# Patient Record
Sex: FEMALE | Race: WHITE | NOT HISPANIC OR LATINO | Employment: OTHER | URBAN - METROPOLITAN AREA
[De-identification: names, ages, dates, MRNs, and addresses within clinical notes are randomized per-mention and may not be internally consistent; named-entity substitution may affect disease eponyms.]

---

## 2017-02-27 ENCOUNTER — ALLSCRIPTS OFFICE VISIT (OUTPATIENT)
Dept: OTHER | Facility: OTHER | Age: 72
End: 2017-02-27

## 2017-03-22 ENCOUNTER — HOSPITAL ENCOUNTER (OUTPATIENT)
Dept: MAMMOGRAPHY | Facility: CLINIC | Age: 72
Discharge: HOME | End: 2017-03-22
Payer: MEDICARE

## 2017-03-22 DIAGNOSIS — C50.919 MALIGNANT NEOPLASM OF FEMALE BREAST (HCC): ICD-10-CM

## 2017-03-22 PROCEDURE — G0206 DX MAMMO INCL CAD UNI: HCPCS

## 2017-03-29 ENCOUNTER — ALLSCRIPTS OFFICE VISIT (OUTPATIENT)
Dept: OTHER | Facility: OTHER | Age: 72
End: 2017-03-29

## 2017-04-14 ENCOUNTER — ALLSCRIPTS OFFICE VISIT (OUTPATIENT)
Dept: OTHER | Facility: OTHER | Age: 72
End: 2017-04-14

## 2017-04-24 ENCOUNTER — TRANSCRIBE ORDERS (OUTPATIENT)
Dept: LAB | Facility: CLINIC | Age: 72
End: 2017-04-24

## 2017-04-24 ENCOUNTER — APPOINTMENT (OUTPATIENT)
Dept: LAB | Facility: CLINIC | Age: 72
End: 2017-04-24
Payer: MEDICARE

## 2017-04-24 DIAGNOSIS — E78.5 HYPERLIPIDEMIA: ICD-10-CM

## 2017-04-24 DIAGNOSIS — E03.9 HYPOTHYROIDISM: ICD-10-CM

## 2017-04-24 LAB
ALBUMIN SERPL BCP-MCNC: 3.6 G/DL (ref 3.5–5)
ALP SERPL-CCNC: 70 U/L (ref 46–116)
ALT SERPL W P-5'-P-CCNC: 27 U/L (ref 12–78)
ANION GAP SERPL CALCULATED.3IONS-SCNC: 8 MMOL/L (ref 4–13)
AST SERPL W P-5'-P-CCNC: 17 U/L (ref 5–45)
BILIRUB SERPL-MCNC: 0.54 MG/DL (ref 0.2–1)
BUN SERPL-MCNC: 15 MG/DL (ref 5–25)
CALCIUM SERPL-MCNC: 8.9 MG/DL (ref 8.3–10.1)
CHLORIDE SERPL-SCNC: 102 MMOL/L (ref 100–108)
CHOLEST SERPL-MCNC: 147 MG/DL (ref 50–200)
CO2 SERPL-SCNC: 29 MMOL/L (ref 21–32)
CREAT SERPL-MCNC: 0.48 MG/DL (ref 0.6–1.3)
GFR SERPL CREATININE-BSD FRML MDRD: >60 ML/MIN/1.73SQ M
GLUCOSE P FAST SERPL-MCNC: 101 MG/DL (ref 65–99)
HDLC SERPL-MCNC: 57 MG/DL (ref 40–60)
LDLC SERPL CALC-MCNC: 61 MG/DL (ref 0–100)
POTASSIUM SERPL-SCNC: 3.7 MMOL/L (ref 3.5–5.3)
PROT SERPL-MCNC: 6.9 G/DL (ref 6.4–8.2)
SODIUM SERPL-SCNC: 139 MMOL/L (ref 136–145)
TRIGL SERPL-MCNC: 147 MG/DL
TSH SERPL DL<=0.05 MIU/L-ACNC: 4.32 UIU/ML (ref 0.36–3.74)

## 2017-04-24 PROCEDURE — 84443 ASSAY THYROID STIM HORMONE: CPT

## 2017-04-24 PROCEDURE — 80061 LIPID PANEL: CPT

## 2017-04-24 PROCEDURE — 36415 COLL VENOUS BLD VENIPUNCTURE: CPT

## 2017-04-24 PROCEDURE — 80053 COMPREHEN METABOLIC PANEL: CPT

## 2017-04-26 ENCOUNTER — GENERIC CONVERSION - ENCOUNTER (OUTPATIENT)
Dept: OTHER | Facility: OTHER | Age: 72
End: 2017-04-26

## 2017-05-02 ENCOUNTER — ALLSCRIPTS OFFICE VISIT (OUTPATIENT)
Dept: OTHER | Facility: OTHER | Age: 72
End: 2017-05-02

## 2017-05-02 ENCOUNTER — LAB REQUISITION (OUTPATIENT)
Dept: LAB | Facility: HOSPITAL | Age: 72
End: 2017-05-02
Payer: MEDICARE

## 2017-05-02 DIAGNOSIS — Z78.0 ASYMPTOMATIC MENOPAUSAL STATE: ICD-10-CM

## 2017-05-02 DIAGNOSIS — Z01.419 ENCOUNTER FOR GYNECOLOGICAL EXAMINATION WITHOUT ABNORMAL FINDING: ICD-10-CM

## 2017-05-02 DIAGNOSIS — Z79.811 LONG TERM CURRENT USE OF AROMATASE INHIBITOR: ICD-10-CM

## 2017-05-02 PROCEDURE — G0145 SCR C/V CYTO,THINLAYER,RESCR: HCPCS | Performed by: OBSTETRICS & GYNECOLOGY

## 2017-05-05 ENCOUNTER — GENERIC CONVERSION - ENCOUNTER (OUTPATIENT)
Dept: OTHER | Facility: OTHER | Age: 72
End: 2017-05-05

## 2017-05-08 LAB
LAB AP GYN PRIMARY INTERPRETATION: NORMAL
Lab: NORMAL

## 2017-08-28 ENCOUNTER — ALLSCRIPTS OFFICE VISIT (OUTPATIENT)
Dept: OTHER | Facility: OTHER | Age: 72
End: 2017-08-28

## 2017-09-26 ENCOUNTER — TRANSCRIBE ORDERS (OUTPATIENT)
Dept: ADMINISTRATIVE | Facility: HOSPITAL | Age: 72
End: 2017-09-26

## 2017-09-26 ENCOUNTER — ALLSCRIPTS OFFICE VISIT (OUTPATIENT)
Dept: OTHER | Facility: OTHER | Age: 72
End: 2017-09-26

## 2017-09-26 DIAGNOSIS — C50.911 MALIGNANT NEOPLASM OF RIGHT FEMALE BREAST, UNSPECIFIED SITE OF BREAST: Primary | ICD-10-CM

## 2017-10-14 DIAGNOSIS — E78.5 HYPERLIPIDEMIA: ICD-10-CM

## 2017-10-30 ENCOUNTER — TRANSCRIBE ORDERS (OUTPATIENT)
Dept: LAB | Facility: CLINIC | Age: 72
End: 2017-10-30

## 2017-10-30 ENCOUNTER — APPOINTMENT (OUTPATIENT)
Dept: LAB | Facility: CLINIC | Age: 72
End: 2017-10-30
Payer: MEDICARE

## 2017-10-30 DIAGNOSIS — E78.5 HYPERLIPIDEMIA: ICD-10-CM

## 2017-10-30 LAB
ALBUMIN SERPL BCP-MCNC: 3.8 G/DL (ref 3.5–5)
ALP SERPL-CCNC: 68 U/L (ref 46–116)
ALT SERPL W P-5'-P-CCNC: 31 U/L (ref 12–78)
ANION GAP SERPL CALCULATED.3IONS-SCNC: 8 MMOL/L (ref 4–13)
AST SERPL W P-5'-P-CCNC: 20 U/L (ref 5–45)
BILIRUB SERPL-MCNC: 0.55 MG/DL (ref 0.2–1)
BUN SERPL-MCNC: 13 MG/DL (ref 5–25)
CALCIUM SERPL-MCNC: 9.2 MG/DL (ref 8.3–10.1)
CHLORIDE SERPL-SCNC: 104 MMOL/L (ref 100–108)
CHOLEST SERPL-MCNC: 142 MG/DL (ref 50–200)
CO2 SERPL-SCNC: 28 MMOL/L (ref 21–32)
CREAT SERPL-MCNC: 0.6 MG/DL (ref 0.6–1.3)
GFR SERPL CREATININE-BSD FRML MDRD: 92 ML/MIN/1.73SQ M
GLUCOSE P FAST SERPL-MCNC: 99 MG/DL (ref 65–99)
HDLC SERPL-MCNC: 55 MG/DL (ref 40–60)
LDLC SERPL DIRECT ASSAY-MCNC: 77 MG/DL (ref 0–100)
POTASSIUM SERPL-SCNC: 3.8 MMOL/L (ref 3.5–5.3)
PROT SERPL-MCNC: 7.4 G/DL (ref 6.4–8.2)
SODIUM SERPL-SCNC: 140 MMOL/L (ref 136–145)

## 2017-10-30 PROCEDURE — 82465 ASSAY BLD/SERUM CHOLESTEROL: CPT

## 2017-10-30 PROCEDURE — 36415 COLL VENOUS BLD VENIPUNCTURE: CPT

## 2017-10-30 PROCEDURE — 83721 ASSAY OF BLOOD LIPOPROTEIN: CPT

## 2017-10-30 PROCEDURE — 83718 ASSAY OF LIPOPROTEIN: CPT

## 2017-10-30 PROCEDURE — 80053 COMPREHEN METABOLIC PANEL: CPT

## 2017-11-01 ENCOUNTER — GENERIC CONVERSION - ENCOUNTER (OUTPATIENT)
Dept: OTHER | Facility: OTHER | Age: 72
End: 2017-11-01

## 2017-11-03 ENCOUNTER — GENERIC CONVERSION - ENCOUNTER (OUTPATIENT)
Dept: OTHER | Facility: OTHER | Age: 72
End: 2017-11-03

## 2017-11-03 ENCOUNTER — APPOINTMENT (OUTPATIENT)
Dept: RADIATION ONCOLOGY | Facility: CLINIC | Age: 72
End: 2017-11-03
Attending: RADIOLOGY
Payer: MEDICARE

## 2017-11-03 PROCEDURE — 99214 OFFICE O/P EST MOD 30 MIN: CPT | Performed by: RADIOLOGY

## 2017-11-15 ENCOUNTER — ALLSCRIPTS OFFICE VISIT (OUTPATIENT)
Dept: OTHER | Facility: OTHER | Age: 72
End: 2017-11-15

## 2017-11-16 NOTE — PROGRESS NOTES
Assessment    1  Hyperlipidemia (272 4) (E78 5)   2  Hypertension (401 9) (I10)   3  Hypothyroidism (244 9) (E03 9)   4  Malignant neoplasm of upper-outer quadrant of left breast in female, estrogen receptor positive (174 4,V86 0) (C50 412,Z17 0)   5  Use of anastrozole (Arimidex) (V07 52) (Z79 811)   6  Never a smoker   7  Need for immunization against influenza (V04 81) (Z23)   8  Screening for genitourinary condition (V81 6) (Z13 89)    Plan  Hyperlipidemia    · (1) COMPREHENSIVE METABOLIC PANEL; Status:Active; Requested for:05Xau2282;    · (1) LIPID PANEL, FASTING; Status:Active; Requested for:51Swk4466;   Hypothyroidism    · (1) TSH; Status:Active; Requested for:07Pkr9083;   Screening for genitourinary condition    · *VB - Urinary Incontinence Screen (Dx Z13 89 Screen for UI); Status:Complete -Retrospective By Protocol Authorization;   Done: 45YSZ2586 12:17PM    Discussion/Summary    1 : Hyperlipidemia: Cholesterol is fairly reasonable at this point  LDL is excellent, HDL is excellent, total is good  No changes, check in 6 months  Of note, patient did mention that she was having some weakness in her legs with long walks  Would recommend 2 week trial off pravastatin, then restart and look for the symptoms to change during that time  We did discuss this today, and she understands what to do  Hypertension: Stable  No changes  Blood pressures at home range from 704-137 systolic  She is doing extremely well at this point  Hypothyroidism: Patient is due for a recheck of TSH in 6 months  Her last TSH was reasonable  We did discuss the possibility of what to do to improve her thyroid naturally, but there really is not anything that she can use  Follow-up in 6 months  Breast cancer: Follow with specialists  Stable at the moment  Last exam from surgeon showed SUNITHA        Chief Complaint  Follow up hyperglycemia, hyperlipidemia, HTNBW resultsDose Flu immunization administered today - [facility]      History of Present Illness  Patient is here to follow-up on laboratory studies  Please see copies on chart  breast cancer, the patient is following with Radiation Oncology, Hematology/Oncology, Surgical Oncology  Using an estradiol, 1 mg daily  shows a sodium, potassium, creatinine, AST, ALT, GFR that are all normal, along with blood sugar of 99 (normal)  cholesterol 142  LDL 77  HDL 55  thyroid, currently on 25 mcg Synthroid  cholesterol, the patient is currently using pravastatin at 40 mg did mention that her legs occasionally will feel tired, especially with like long walks  She does do exercise, and does not notice any symptoms at those points  Review of Systems   Constitutional: No fever, no chills, feels well, no tiredness, no recent weight gain or weight loss  Eyes: No complaints of eye pain, no red eyes, no eyesight problems, no discharge, no dry eyes, no itching of eyes  ENT: no complaints of earache, no loss of hearing, no nose bleeds, no nasal discharge, no sore throat, no hoarseness  Cardiovascular: No complaints of slow heart rate, no fast heart rate, no chest pain, no palpitations, no leg claudication, no lower extremity edema  Respiratory: No complaints of shortness of breath, no wheezing, no cough, no SOB on exertion, no orthopnea, no PND  Gastrointestinal: No complaints of abdominal pain, no constipation, no nausea or vomiting, no diarrhea, no bloody stools  Genitourinary: No complaints of dysuria, no incontinence, no pelvic pain, no dysmenorrhea, no vaginal discharge or bleeding  Musculoskeletal: No complaints of arthralgias, no myalgias, no joint swelling or stiffness, no limb pain or swelling  Integumentary: No complaints of skin rash or lesions, no itching, no skin wounds, no breast pain or lump  Neurological: No complaints of headache, no confusion, no convulsions, no numbness, no dizziness or fainting, no tingling, no limb weakness, no difficulty walking    Psychiatric: Not suicidal, no sleep disturbance, no anxiety or depression, no change in personality, no emotional problems  Endocrine: No complaints of proptosis, no hot flashes, no muscle weakness, no deepening of the voice, no feelings of weakness  Hematologic/Lymphatic: No complaints of swollen glands, no swollen glands in the neck, does not bleed easily, does not bruise easily  Preventive Quality 65 and Older: Falls Risk: The patient fell 0 times in the past 12 months  The patient currently has no urinary incontinence symptoms  (0)      Active Problems    1  Abdominal cramping (789 00) (R10 9)   2  Bloating (787 3) (R14 0)   3  Encounter for routine gynecological examination with Papanicolaou smear of cervix (V72 31,V76 2) (Z01 419)   4  History of hypokalemia (V12 29) (Z86 39)   5  Hyperglycemia (790 29) (R73 9)   6  Hyperlipidemia (272 4) (E78 5)   7  Hypertension (401 9) (I10)   8  Hypothyroidism (244 9) (E03 9)   9  Malignant neoplasm of upper-outer quadrant of left breast in female, estrogen receptor positive (174 4,V86 0) (C50 412,Z17 0)   10  Menopause (627 2) (Z78 0)   11  Use of anastrozole (Arimidex) (V07 52) (H80 241)    Past Medical History    1  History of Abnormal findings on diagnostic imaging of breast (793 89) (R92 8)   2  History of Age At First Period 16 Years Old (Menarche)   3  History of Age At First Pregnancy 33 Years Old   4  History of 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 (285 3,174 9,E933 1) (D64 81,C50 919,T45  1X5A)   5  History of Encounter for Pap smear (V76 9) (Z12 9)   6  History of Fibroadenoma of right breast (217) (D24 1)   7  History Of 2  Previous Pregnancies (V61 5)   8  History of hypokalemia (V12 29) (Z86 39)   9  History of Long term use of drug (V58 69) (Z79 899)   10  History of Microcalcifications of the breast (793 81) (R92 0)   11  History of Patient on antineoplastic chemotherapy regimen (V58 69) (Z79 899)   12   History of Previous Pregnancies Resulted In 2 Live Birth(S)   13  History of Radiation Therapy (V15 3)    The active problems and past medical history were reviewed and updated today  Surgical History  1  History of Axillary Lymphadenectomy   2  History of Biopsy Breast Percutaneous Needle Core   3  History of Breast Surgery Mastectomy   4  History of Bx Breast Percutan Needle Core Use Imag Guide (Stereotactic)   5  History of Ul  [address]   6  History of  Section   7  History of [place] Lymph Node Biopsy   8  History of Tonsillectomy   9  History of Tubal Ligation    The surgical history was reviewed and updated today  Family History  Mother    1  Family history of Congestive Heart Failure   2  Family history of Coronary Artery Disease (V17 49)   3  Family history of Diabetes Mellitus (V18 0)  Family History    4  Denied: Family history of Cancer   5  No family history of mental disorder    The family history was reviewed and updated today  Social History     · Being A Social Drinker   · Exercises daily (V49 89) (Z78 9)   · Marital History - Currently    · Never a smoker   · No secondhand smoke exposure (V49 89) (Z78 9)   · Retired  The social history was reviewed and updated today  The social history was reviewed and is unchanged  Current Meds   1  Anastrozole 1 MG Oral Tablet; take 1 tablet once daily; Therapy: 81WOR0858 to (Evaluate:43Fny3267)  Requested for: 77Hwa5799; Last Rx:05Tft9727 Ordered   2  Calcium 600+D 600-400 MG-UNIT Oral Tablet; TAKE 2 TABLET Daily; Therapy: 27VSO2031 to Recorded   3  HydroCHLOROthiazide 12 5 MG Oral Capsule; take 1 capsule daily; Therapy: 14JON6283 to (Oneil Regalado)  Requested for: 01YMI2606; Last Rx:2017 Ordered   4  Levothyroxine Sodium 25 MCG Oral Tablet; Take 1 tablet daily; Therapy: 34Vbg4286 to (Oneil Regalado)  Requested for: 97KFQ4919; Last Rx:2017 Ordered   5  Multivitamins Oral Capsule;  Therapy: 91XJM6947 to Recorded 6  Pravastatin Sodium 40 MG Oral Tablet; TAKE 1 TABLET DAILY AS DIRECTED; Therapy: 33ZBN8241 to (Bib Pineda)  Requested for: 16OVB3485; Last Rx:12Oct2017 Ordered    The medication list was reviewed and updated today  Allergies  1  No Known Drug Allergies    Vitals  Vital Signs    Recorded: 48NWG4097 12:30PM Recorded: 38XYI1146 12:03PM   Heart Rate  72, L Radial   Pulse Quality  Regular, L Radial   Systolic 276 456, LUE, Sitting   Diastolic 90 90, LUE, Sitting   BP CUFF SIZE  Large   Height  5 ft 3 5 in   Weight  178 lb    BMI Calculated  31 04   BSA Calculated  1 85       Physical Exam   Constitutional  General appearance: No acute distress, well appearing and well nourished  Pulmonary  Respiratory effort: No increased work of breathing or signs of respiratory distress  Auscultation of lungs: Clear to auscultation  Cardiovascular  Auscultation of heart: Normal rate and rhythm, normal S1 and S2, without murmurs  Carotid pulses: Normal          Health Management  Health Maintenance   (every) THINPREP PAP; every 1 year; Last 04YDA4037; Next Due: 08IBY0336; Overdue  BREAST EXAM; every 1 year; Next Due: 33Rka5366; Overdue  COLONOSCOPY; every 10 years; Last 01NVH2150; Next Due: 65AQR3851; Overdue  Dexa Scan Peripheral (extremity); every 2 years; Next Due: 23Vwn4681; Overdue  PELVIC EXAM; every 1 year; Next Due: 20Dtz6859; Overdue    Future Appointments    Date/Time Provider Specialty Site   03/28/2018 11:45 AM TRISTON Mñuoz  [facility] On[facility]   08/27/2018 09:40 AM TRISTON Lai  Hematology Oncology [facility]       Signatures   Electronically signed by :  TRISTON Mclean ; Nov 15 2017 12:31PM EST                       (Author)

## 2018-01-10 NOTE — PROGRESS NOTES
Assessment    1  Breast cancer (174 9) (C50 919)    Plan  Breast cancer    · Follow-up visit in 6 months Evaluation and Treatment  Follow-up  Status: Complete   Done: 18MZQ6915 10:40AM   Ordered; For: Breast cancer; Ordered By: Francisco Amezcua Performed:  Due: 15EZS5581; Last Updated By: Devin King; 8/22/2016 10:31:34 AM    Discussion/Summary  Discussion Summary:   A 80year old postmenopausal woman with stage IIB left breast cancer, grade 3, ER/NJ positive, HER-2 Fish equivocal disease  She underwent mastectomy with axillary lymph node dissection, without reconstruction resulting in the SUNITHA  Multiple HER-2 fish test were consistently showed a borderline amplification  She was treated as HER-2 positive disease  She completed adjuvant chemotherapy as well as one year course of adjuvant Herceptin, in June 2015  She is currently on adjuvant hormonal therapy with anastrozole with minimal side effects  She has no evidence of recurrent disease, based on her symptomatology, physical exam as well as mammography  I recommended her to continue with anastrozole 1 mg once a day  I will see her again in 6 months for routine follow-up  She is in agreement with my recommendations  Chief Complaint  Chief Complaint: Chief Complaint:   The patient presents to the office today with Left breast cancer  Stage IIB (pT2, N1b, M0) grade 3, ER positive, NJ weakly positive, HER-2 fish, borderline disease  Diagnosed in April 2014  History of Present Illness  HPI: A 80year old postmenopausal woman, who underwent regular screening mammography, which showed an abnormality in her left breast  Therefore, she underwent ultrasound-guided biopsy in March 9, 2014, which showed invasive ductal carcinoma  Subsequently, she underwent mastectomy with axillary lymph node dissection in April 4, 2014 which showed a 3 5 cm invasive ductal carcinoma, grade 3  3/11 axillary lymph node were positive for metastatic disease   This was %, OR 10-15% positive, HER-2 2+ disease  HER-2 Fish was equivoal for the gene amplification  I requested pathology Department to set another section for the second HER-2 Fish, which is pending  She presents today to discuss adjuvant treatment options  She is otherwise healthy with past medical history including hypertension, hypothyroidism, and hypercholesterolemia  She has no complaint of pain  Her weight has been stable  She has no respiratory symptoms  She has no family history of breast cancer or ovarian cancer  Her performance status is normal    Previous Therapy:   1  Adjuvant chemotherapy with dose dense a c  X4 followed by weekly paclitaxel and Herceptin x12  Completed in September 2014  2  postmastectomy radiation therapy completed in December 2014  3  adjuvant Herceptin monotherapy, completed in June 2015  Current Therapy: 1  adjuvant hormonal therapy with anastrozole since October 2014  Disease Status: SUNITHA status post mastectomy with lymph node dissection  Interval History: A 80year old postmenopausal woman, with stage IIB left breast cancer, grade 3, ER positive, HER-2 fish, borderline disease  She had 3 positive lymph nodes, when she underwent mastectomy with lymph node dissection  We have tested her to fish on several occasion, all of which came back as a borderline disease  We decided to treat her as HER-2 positive disease  She completed adjuvant chemotherapy with a c  followed by Taxol and Herceptin  She finished adjuvant Herceptin monotherapy in June 2015 without cardiac toxicity  She is currently on adjuvant hormonal therapy with anastrozole  She presented today for routine follow-up  She is doing quite well  She continued to have mild hot flashes  She has absolutely no musculoskeletal symptoms  Her weight is stable  She has no respiratory symptom  She denied fever, chills or night sweats   Her performance status is normal       Review of Systems  Complete-Female:   Constitutional: as noted in HPI  Eyes: No complaints of eye pain, no red eyes, no eyesight problems, no discharge, no dry eyes, no itching of eyes  ENT: as noted in HPI  Cardiovascular: No complaints of slow heart rate, no fast heart rate, no chest pain, no palpitations, no leg claudication, no lower extremity edema  Respiratory: as noted in HPI  Gastrointestinal: No complaints of abdominal pain, no constipation, no nausea or vomiting, no diarrhea, no bloody stools  Genitourinary: No complaints of dysuria, no incontinence, no pelvic pain, no dysmenorrhea, no vaginal discharge or bleeding  Musculoskeletal: No complaints of arthralgias, no myalgias, no joint swelling or stiffness, no limb pain or swelling  Integumentary: as noted in HPI  Neurological: No complaints of headache, no confusion, no convulsions, no numbness, no dizziness or fainting, no tingling, no limb weakness, no difficulty walking  Psychiatric: Not suicidal, no sleep disturbance, no anxiety or depression, no change in personality, no emotional problems  Endocrine: No complaints of proptosis, no hot flashes, no muscle weakness, no deepening of the voice, no feelings of weakness  Hematologic/Lymphatic: No complaints of swollen glands, no swollen glands in the neck, does not bleed easily, does not bruise easily  ROS Reviewed:   ROS reviewed  Active Problems    1  Abdominal cramping (789 00) (R10 9)   2  Bloating (787 3) (R14 0)   3  Breast cancer (174 9) (C50 919)   4  Encounter for routine gynecological examination with Papanicolaou smear of cervix   (V72 31,V76 2) (Z01 419)   5  History of hypokalemia (V12 29) (Z86 39)   6  Hyperglycemia (790 29) (R73 9)   7  Hyperlipidemia (272 4) (E78 5)   8  Hypertension (401 9) (I10)   9  Hypothyroidism (244 9) (E03 9)   10  Use of anastrozole (Arimidex) (V07 52) (C50 541)    Past Medical History    1  History of Abnormal findings on diagnostic imaging of breast (793 89) (R92 8)   2   History of Age At First Period 16 Years Old (Menarche)   3  History of Age At First Pregnancy 33 Years Old   4  History of 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   (285 3,174 9,E933 1) (D64 81,C50 919,T45  1X5A)   5  History of Encounter for Pap smear (V76 9) (Z12 9)   6  History of Fibroadenoma of right breast (217) (D24 1)   7  History Of 2  Previous Pregnancies (V61 5)   8  History of hypokalemia (V12 29) (Z86 39)   9  History of Long term use of drug (V58 69) (Z79 899)   10  History of Microcalcifications of the breast (793 81) (R92 0)   11  History of Patient on antineoplastic chemotherapy regimen (V58 69) (Z79 899)   12  History of Previous Pregnancies Resulted In 2  Live Birth(S)   13  History of Radiation Therapy (V15 3)  Past Medical History Reviewed: The past medical history was reviewed and updated today  Surgical History    1  History of Axillary Lymphadenectomy   2  History of Biopsy Breast Percutaneous Needle Core   3  History of Breast Surgery Mastectomy   4  History of Bx Breast Percutan Needle Core Use Imag Guide (Stereotactic)   5  History of Ul  [address]   6  History of  Section   7  History of [place] Lymph Node Biopsy   8  History of Tonsillectomy   9  History of Tubal Ligation  Surgical History Reviewed: The surgical history was reviewed and updated today  Family History  Mother    1  Family history of Congestive Heart Failure   2  Family history of Coronary Artery Disease (V17 49)   3  Family history of Diabetes Mellitus (V18 0)  Family History    4  Denied: Family history of Cancer  Family History Reviewed: The family history was reviewed and updated today  Social History    · Being A Social Drinker   · Exercises daily (V49 89) (Z78 9)   · Marital History - Currently    · Never A Smoker   · No secondhand smoke exposure (V49 89) (Z78 9)   · Retired  Social History Reviewed:  The social history was reviewed and updated today  The social history was reviewed and is unchanged  Current Meds   1  Anastrozole 1 MG Oral Tablet; take 1 tablet once daily; Therapy: 73WMA2108 to 61 23 68)  Requested for: [id number]; Last   Rx:97Afy1493 Ordered   2  Calcium 600+D 600-400 MG-UNIT Oral Tablet; TAKE 2 TABLET Daily; Therapy: 86BXY3642 to Recorded   3  Hydrochlorothiazide 12 5 MG Oral Capsule; take 1 capsule daily; Therapy: 83ULX4276 to (Authur Blush)  Requested for: 11NMV3767; Last   Rx:09Nov2015 Ordered   4  Levothyroxine Sodium 25 MCG Oral Tablet; Take 1 tablet daily; Therapy: 78Ojw3368 to (Authur Blush)  Requested for: 33TMK2994; Last   Rx:09Nov2015 Ordered   5  Pravastatin Sodium 40 MG Oral Tablet; TAKE 1 TABLET DAILY AS DIRECTED; Therapy: 96ORY8185 to (Authur Blush)  Requested for: 88HXO3456; Last   Rx:09Nov2015 Ordered  Medication List Reviewed: The medication list was reviewed and updated today  Medication List Reviewed: The medication list was reviewed and updated today  Allergies    1  No Known Drug Allergies    Vitals  Vital Signs    Recorded: 00LVH1302 67:39RK   Systolic 893   Diastolic 88   Heart Rate 78   Respiration 18   Temperature 98 5 F   O2 Saturation 97   Height 5 ft 4 in   Weight 176 lb    BMI Calculated 30 21   BSA Calculated 1 86     Physical Exam    Constitutional   General appearance: No acute distress, well appearing and well nourished  Eyes   Conjunctiva and lids: No swelling, erythema or discharge  Pupils and irises: Equal, round and reactive to light  Ears, Nose, Mouth, and Throat   External inspection of ears and nose: Normal     Otoscopic examination: Tympanic membranes translucent with normal light reflex  Canals patent without erythema  Nasal mucosa, septum, and turbinates: Normal without edema or erythema  Oropharynx: Normal with no erythema, edema, exudate or lesions      Pulmonary   Respiratory effort: No increased work of breathing or signs of respiratory distress  Auscultation of lungs: Clear to auscultation  Cardiovascular   Palpation of heart: Normal PMI, no thrills  Auscultation of heart: Normal rate and rhythm, normal S1 and S2, without murmurs  Examination of extremities for edema and/or varicosities: Normal     Carotid pulses: Normal     Abdomen   Abdomen: Non-tender, no masses  Liver and spleen: No hepatomegaly or splenomegaly  Lymphatic   Palpation of lymph nodes in neck: No lymphadenopathy  Musculoskeletal   Gait and station: Normal     Digits and nails: Normal without clubbing or cyanosis  Inspection/palpation of joints, bones, and muscles: Normal     Skin   Skin and subcutaneous tissue: Normal without rashes or lesions  Neurologic   Cranial nerves: Cranial nerves 2-12 intact  Reflexes: 2+ and symmetric  Sensation: No sensory loss  Psychiatric   Orientation to person, place, and time: Normal     Mood and affect: Normal     Additional Exam:  Breast exam; status post left mastectomy without reconstruction  No palpable abnormality in her left chest wall  Right breast exam is negative  ECOG 0       Results/Data  Results Form:   Results   Pathology 3 5 cm of invasive ductal carcinoma, grade 3  3/11 axillary lymph node positive for metastatic disease  % positive, PA 10-15% positive, HER-2 2+ disease  HER-2 Fish is equivocal  Second, HER-2 Fish was borderline    Stage IIB(pT2, pN1b, M0)  Radiology Chest x-ray was negative for pulmonary disease  MUGA scan in April 2015 showed ejection fraction 66%  DEXA scan in 2014 showed no evidence of osteopenia or osteoporosis  Mammography on March 2016 was benign  BI-RADS 2  Lab Results WBC 4 1, hemoglobin 10 6, platelet count 931  CMP are unremarkable  Health Management  Health Maintenance   (every) THINPREP PAP; every 1 year; Last 22IAW6766; Next Due: 29VHZ0665; Overdue  BREAST EXAM; every 1 year;  Next Due: 42Dqg6391; Overdue  COLONOSCOPY; every 10 years; Last 13VGN3836; Next Due: 84HLQ1630; Overdue  Dexa Scan Peripheral (extremity); every 2 years; Next Due: 07Rhm1502; Overdue  PELVIC EXAM; every 1 year; Next Due: 80Elq4981; Overdue    Future Appointments    Date/Time Provider Specialty Site   10/14/2016 01:30 PM TRISTON August  [address] [place] [address]   09/26/2016 08:30 AM TRISTON Mccullough   [facility]     Signatures   Electronically signed by : TRISTON Suggs ; Aug 22 2016 10:33AM EST                       (Author)

## 2018-01-11 NOTE — RESULT NOTES
Verified Results  (1) COMPREHENSIVE METABOLIC PANEL [id number] 89:34JZ [place] [facility] Number: [id number]     Test Name Result Flag Reference   SODIUM 140 mmol/L  136-145   POTASSIUM 3 8 mmol/L  3 5-5 3   CHLORIDE 104 mmol/L  100-108   CARBON DIOXIDE 28 mmol/L  21-32   ANION GAP (CALC) 8 mmol/L  4-13   BLOOD UREA NITROGEN 13 mg/dL  5-25   CREATININE 0 60 mg/dL  0 60-1 30   Standardized to IDMS reference method   CALCIUM 9 2 mg/dL  8 3-10 1   BILI, TOTAL 0 55 mg/dL  0 20-1 00   ALK PHOSPHATAS 68 U/L     ALT (SGPT) 31 U/L  12-78   Specimen collection should occur prior to Sulfasalazine and/or Sulfapyridine administration due to the potential for falsely depressed results  AST(SGOT) 20 U/L  5-45   Specimen collection should occur prior to Sulfasalazine administration due to the potential for falsely depressed results  ALBUMIN 3 8 g/dL  3 5-5 0   TOTAL PROTEIN 7 4 g/dL  6 4-8 2   eGFR 92 ml/min/1 73sq m     National Kidney Disease Education Program recommendations are as follows:  GFR calculation is accurate only with a steady state creatinine  Chronic Kidney disease less than 60 ml/min/1 73 sq  meters  Kidney failure less than 15 ml/min/1 73 sq  meters  GLUCOSE FASTING 99 mg/dL  65-99   Specimen collection should occur prior to Sulfasalazine administration due to the potential for falsely depressed results  Specimen collection should occur prior to Sulfapyridine administration due to the potential for falsely elevated results       (1) CHOLESTEROL, TOTAL 30Oct2017 07:44AM [place] [facility]   [employer] Order Number: [id number]     Test Name Result Flag Reference   CHOLESTEROL 142 mg/dL     Cholesterol:         Desirable        <200 mg/dl      Borderline High  200-239 mg/dl      High             >239 mg/dl     (1) LDL,DIRECT [id number] 07:44AM [place] [facility]   [employer] Order Number: [id number]     Test Name Result Flag Reference   LDL, DIRECT 77 mg/dl  0-100   - Patient Instructions: This is a fasting blood test  Water,black tea or black  coffee only after 9:00pm the night before test   Drink 2 glasses of water the morning of test       LDL Cholesterol:        Optimal          <100 mg/dl        Near Optimal     100-129 mg/dl        Above Optimal          Borderline High   130-159 mg/dl          High              160-189 mg/dl          Very High        >189 mg/dl     (1) HDL,DIRECT 30Oct2017 07:44AM Renzo Fleming   [facility] Order Number: [id number]     Test Name Result Flag Reference   HDL,DIRECT 55 mg/dL  40-60   Specimen collection should occur prior to Metamizole administration due to the potential for falsely depressed results

## 2018-01-12 VITALS
WEIGHT: 179 LBS | RESPIRATION RATE: 15 BRPM | SYSTOLIC BLOOD PRESSURE: 128 MMHG | OXYGEN SATURATION: 97 % | DIASTOLIC BLOOD PRESSURE: 84 MMHG | HEART RATE: 88 BPM | HEIGHT: 64 IN | TEMPERATURE: 98.6 F | BODY MASS INDEX: 30.56 KG/M2

## 2018-01-12 VITALS
HEIGHT: 64 IN | RESPIRATION RATE: 16 BRPM | TEMPERATURE: 97.4 F | OXYGEN SATURATION: 96 % | DIASTOLIC BLOOD PRESSURE: 62 MMHG | WEIGHT: 178 LBS | BODY MASS INDEX: 30.39 KG/M2 | SYSTOLIC BLOOD PRESSURE: 130 MMHG | HEART RATE: 82 BPM

## 2018-01-12 VITALS
BODY MASS INDEX: 30.22 KG/M2 | DIASTOLIC BLOOD PRESSURE: 68 MMHG | OXYGEN SATURATION: 95 % | RESPIRATION RATE: 16 BRPM | TEMPERATURE: 98 F | SYSTOLIC BLOOD PRESSURE: 140 MMHG | WEIGHT: 177 LBS | HEART RATE: 84 BPM | HEIGHT: 64 IN

## 2018-01-13 VITALS
BODY MASS INDEX: 30.56 KG/M2 | DIASTOLIC BLOOD PRESSURE: 80 MMHG | OXYGEN SATURATION: 96 % | HEIGHT: 64 IN | WEIGHT: 179 LBS | SYSTOLIC BLOOD PRESSURE: 122 MMHG | TEMPERATURE: 98.4 F | HEART RATE: 76 BPM | RESPIRATION RATE: 15 BRPM

## 2018-01-13 VITALS
BODY MASS INDEX: 30.07 KG/M2 | DIASTOLIC BLOOD PRESSURE: 94 MMHG | HEIGHT: 64 IN | SYSTOLIC BLOOD PRESSURE: 146 MMHG | WEIGHT: 176.13 LBS

## 2018-01-13 VITALS
SYSTOLIC BLOOD PRESSURE: 160 MMHG | HEIGHT: 64 IN | WEIGHT: 178 LBS | DIASTOLIC BLOOD PRESSURE: 90 MMHG | BODY MASS INDEX: 30.39 KG/M2 | HEART RATE: 72 BPM

## 2018-01-13 VITALS
BODY MASS INDEX: 30.63 KG/M2 | WEIGHT: 179.38 LBS | HEART RATE: 72 BPM | DIASTOLIC BLOOD PRESSURE: 82 MMHG | SYSTOLIC BLOOD PRESSURE: 124 MMHG | RESPIRATION RATE: 16 BRPM | HEIGHT: 64 IN

## 2018-01-13 NOTE — RESULT NOTES
Verified Results  (1) COMPREHENSIVE METABOLIC PANEL [id number] 52:63OI Herbert [facility] Order Number: [id number]     Test Name Result Flag Reference   SODIUM 139 mmol/L  136-145   POTASSIUM 3 7 mmol/L  3 5-5 3   CHLORIDE 102 mmol/L  100-108   CARBON DIOXIDE 29 mmol/L  21-32   ANION GAP (CALC) 8 mmol/L  4-13   BLOOD UREA NITROGEN 15 mg/dL  5-25   CREATININE 0 48 mg/dL L 0 60-1 30   Standardized to IDMS reference method   CALCIUM 8 9 mg/dL  8 3-10 1   BILI, TOTAL 0 54 mg/dL  0 20-1 00   ALK PHOSPHATAS 70 U/L     ALT (SGPT) 27 U/L  12-78   AST(SGOT) 17 U/L  5-45   ALBUMIN 3 6 g/dL  3 5-5 0   TOTAL PROTEIN 6 9 g/dL  6 4-8 2   eGFR Non-African American      >60 0 ml/min/1 73sq [facility] Disease Education Program recommendations are as follows:  GFR calculation is accurate only with a steady state creatinine  Chronic Kidney disease less than 60 ml/min/1 73 sq  meters  Kidney failure less than 15 ml/min/1 73 sq  meters  GLUCOSE FASTING 101 mg/dL H 65-99     (1) LIPID PANEL, FASTING 24Apr2017 07:16AM Herbert OVALLE Order Number: [id number]     Test Name Result Flag Reference   CHOLESTEROL 147 mg/dL     HDL,DIRECT 57 mg/dL  40-60   Specimen collection should occur prior to Metamizole administration due to the potential for falsely depressed results  LDL CHOLESTEROL CALCULATED 61 mg/dL  0-100   - Patient Instructions:  This is a fasting blood test  Water,black tea or black  coffee only after 9:00pm the night before test   Drink 2 glasses of water the morning of test       Triglyceride:         Normal              <150 mg/dl       Borderline High    150-199 mg/dl       High               200-499 mg/dl       Very High          >499 mg/dl  Cholesterol:         Desirable        <200 mg/dl      Borderline High  200-239 mg/dl      High             >239 mg/dl  HDL Cholesterol:        High    >59 mg/dL      Low     <41 mg/dL  LDL CALCULATED:    This screening LDL is a calculated result  It does not have the accuracy of the Direct Measured LDL in the monitoring of patients with hyperlipidemia and/or statin therapy  Direct Measure LDL (CMN674) must be ordered separately in these patients  TRIGLYCERIDES 147 mg/dL  <=150   Specimen collection should occur prior to N-Acetylcysteine or Metamizole administration due to the potential for falsely depressed results  (1) TSH 24Apr2017 07:16AM Mitzi Armendariz    Order Number: [id number]     Test Name Result Flag Reference   TSH 4 320 uIU/mL H 0 358-3 740   - Patient Instructions: This bloodwork is non-fasting  Please drink two glasses of water morning of bloodwork  Patients undergoing fluorescein dye angiography may retain small amounts of fluorescein in the body for 48-72 hours post procedure  Samples containing fluorescein can produce falsely depressed TSH values  If the patient had this procedure,a specimen should be resubmitted post fluorescein clearance            The recommended reference ranges for TSH during pregnancy are as follows:  First trimester 0 1 to 2 5 uIU/mL  Second trimester  0 2 to 3 0 uIU/mL  Third trimester 0 3 to 3 0 uIU/m

## 2018-01-22 VITALS
SYSTOLIC BLOOD PRESSURE: 132 MMHG | HEIGHT: 64 IN | HEART RATE: 75 BPM | DIASTOLIC BLOOD PRESSURE: 80 MMHG | OXYGEN SATURATION: 98 % | BODY MASS INDEX: 30.52 KG/M2 | WEIGHT: 178.8 LBS | RESPIRATION RATE: 16 BRPM

## 2018-03-23 ENCOUNTER — HOSPITAL ENCOUNTER (OUTPATIENT)
Dept: MAMMOGRAPHY | Facility: CLINIC | Age: 73
Discharge: HOME | End: 2018-03-23
Payer: MEDICARE

## 2018-03-23 DIAGNOSIS — C50.911 MALIGNANT NEOPLASM OF RIGHT FEMALE BREAST (HCC): ICD-10-CM

## 2018-03-23 PROCEDURE — 77065 DX MAMMO INCL CAD UNI: CPT

## 2018-03-28 ENCOUNTER — OFFICE VISIT (OUTPATIENT)
Dept: SURGICAL ONCOLOGY | Facility: CLINIC | Age: 73
End: 2018-03-28
Payer: MEDICARE

## 2018-03-28 VITALS
HEART RATE: 68 BPM | WEIGHT: 171 LBS | DIASTOLIC BLOOD PRESSURE: 82 MMHG | HEIGHT: 64 IN | SYSTOLIC BLOOD PRESSURE: 128 MMHG | BODY MASS INDEX: 29.19 KG/M2

## 2018-03-28 DIAGNOSIS — C50.912 MALIGNANT NEOPLASM OF LEFT BREAST IN FEMALE, ESTROGEN RECEPTOR POSITIVE, UNSPECIFIED SITE OF BREAST (HCC): Primary | ICD-10-CM

## 2018-03-28 DIAGNOSIS — Z17.0 MALIGNANT NEOPLASM OF LEFT BREAST IN FEMALE, ESTROGEN RECEPTOR POSITIVE, UNSPECIFIED SITE OF BREAST (HCC): Primary | ICD-10-CM

## 2018-03-28 DIAGNOSIS — Z79.811 USE OF ANASTROZOLE (ARIMIDEX): ICD-10-CM

## 2018-03-28 DIAGNOSIS — Z92.21 HX ANTINEOPLASTIC CHEMOTHERAPY: ICD-10-CM

## 2018-03-28 DIAGNOSIS — Z92.3 HX OF RADIATION THERAPY: ICD-10-CM

## 2018-03-28 PROCEDURE — 99214 OFFICE O/P EST MOD 30 MIN: CPT | Performed by: SURGERY

## 2018-03-28 RX ORDER — ANASTROZOLE 1 MG/1
1 TABLET ORAL DAILY
COMMUNITY
Start: 2014-09-26 | End: 2018-04-12 | Stop reason: SDUPTHER

## 2018-03-28 RX ORDER — LEVOTHYROXINE SODIUM 0.03 MG/1
25 TABLET ORAL DAILY
Refills: 3 | COMMUNITY
Start: 2018-01-07 | End: 2018-10-03 | Stop reason: SDUPTHER

## 2018-03-28 RX ORDER — PRAVASTATIN SODIUM 40 MG
40 TABLET ORAL DAILY
Refills: 3 | COMMUNITY
Start: 2018-01-07 | End: 2018-10-03 | Stop reason: SDUPTHER

## 2018-03-28 RX ORDER — HYDROCHLOROTHIAZIDE 12.5 MG/1
12.5 CAPSULE, GELATIN COATED ORAL DAILY
Refills: 3 | COMMUNITY
Start: 2018-01-07 | End: 2018-10-03 | Stop reason: SDUPTHER

## 2018-03-28 NOTE — PROGRESS NOTES
Surgical Oncology Follow Up       [address] [address] [address]    Jose Ansari  1945  [id number]  [address]  [facility]    Chief Complaint   Patient presents with    Follow-up     6 month f/u        Assessment/Plan   Diagnoses and all orders for this visit:    Malignant neoplasm of left breast in female, estrogen receptor positive, unspecified site of breast ([facility] [address] )    Use of anastrozole (Arimidex)    Hx of radiation therapy    Hx antineoplastic chemotherapy    Other orders  -     anastrozole (ARIMIDEX) 1 mg tablet; Take 1 tablet by mouth daily  -     Calcium Carbonate-Vitamin D (CALCIUM 600+D) 600-400 MG-UNIT per tablet; Take 2 tablets by mouth daily  -     hydrochlorothiazide (MICROZIDE) 12 5 mg capsule; Take 12 5 mg by mouth daily  -     levothyroxine 25 mcg tablet; Take 25 mcg by mouth daily  -     Multiple Vitamin (MULTIVITAMINS PO); Take by mouth  -     pravastatin (PRAVACHOL) 40 mg tablet; Take 40 mg by mouth daily        Advance Care Planning/Advance Directives:  Did not discuss  with the patient  Oncology History:    Oncology History      Dose Dense AC X 4        Malignant neoplasm of left breast in female, estrogen receptor positive ([facility] [address] )     Initial Diagnosis     Malignant neoplasm of left breast in female, estrogen receptor positive ([facility] [address] )       3/19/2014 Surgery     Left breast US guided core biopsy,  [facility]          4/14/2014 Surgery     Left breast mastectomy, SLNB, AND         5/13/2014 Surgery     Placement of mediport         2014 -  Radiation     Post mastectomy Rt  Dr Jennifer Falcon  2014 -  Chemotherapy     05/2014  Dose dense AC X 4 cycles  07/2014  Paclitaxel X 12  Herceptin  Dr Josiah Craig  Hormone Therapy     Anastrazole    Dr Josiah Craig            History of Present Illness: breast cancer follow up  -Interval History:n/a    Review of Systems:  Review of Systems   Constitutional: Negative  Negative for appetite change and fever  Eyes: Negative  Respiratory: Negative for shortness of breath  Cardiovascular: Negative  Gastrointestinal: Negative  Endocrine: Negative  Genitourinary: Negative  Musculoskeletal: Negative  Negative for arthralgias and myalgias  Skin: Negative  Allergic/Immunologic: Negative  Neurological: Negative  Hematological: Negative  Negative for adenopathy  Does not bruise/bleed easily  Psychiatric/Behavioral: Negative  Patient Active Problem List   Diagnosis    Malignant neoplasm of left breast in female, estrogen receptor positive ([facility] [address] )    Use of anastrozole (Arimidex)    Hx antineoplastic chemotherapy     Past Medical History:   Diagnosis Date    Disease of thyroid gland     Hypertension      Past Surgical History:   Procedure Laterality Date    BREAST BIOPSY Left 2014    [facility]    BREAST BIOPSY Right 2015    FIBROADENOMA     SECTION      X2    MASTECTOMY Left 2014    PORTACATH PLACEMENT  2014    SENTINEL LYMPH NODE BIOPSY Left 2014    AND    TONSILLECTOMY      TUBAL LIGATION       No family history on file  Social History     Social History    Marital status: /Civil Union     Spouse name: N/A    Number of children: N/A    Years of education: N/A     Occupational History    Not on file       Social History Main Topics    Smoking status: Never Smoker    Smokeless tobacco: Never Used    Alcohol use 1 2 - 1 8 oz/week     2 - 3 Glasses of wine per week    Drug use: No    Sexual activity: Not on file     Other Topics Concern    Not on file     Social History Narrative    No narrative on file       Current Outpatient Prescriptions:     anastrozole (ARIMIDEX) 1 mg tablet, Take 1 tablet by mouth daily, Disp: , Rfl:     Calcium Carbonate-Vitamin D (CALCIUM 600+D) 600-400 MG-UNIT per tablet, Take 2 tablets by mouth daily, Disp: , Rfl:     hydrochlorothiazide (MICROZIDE) 12 5 mg capsule, Take 12 5 mg by mouth daily, Disp: , Rfl: 3    levothyroxine 25 mcg tablet, Take 25 mcg by mouth daily, Disp: , Rfl: 3    Multiple Vitamin (MULTIVITAMINS PO), Take by mouth, Disp: , Rfl:     pravastatin (PRAVACHOL) 40 mg tablet, Take 40 mg by mouth daily, Disp: , Rfl: 3  No Known Allergies    The following portions of the patient's history were reviewed and updated as appropriate: allergies, current medications, past family history, past medical history, past social history, past surgical history and problem list         Vitals:    03/28/18 1145   BP: 128/82   Pulse: 68       Physical Exam   Constitutional: She is oriented to person, place, and time  She appears well-developed and well-nourished  HENT:   Head: Normocephalic and atraumatic  Cardiovascular: Normal heart sounds  Pulmonary/Chest: Breath sounds normal  Right breast exhibits no inverted nipple, no mass, no nipple discharge, no skin change and no tenderness  Left breast exhibits skin change (mastectomy scar)  Left breast exhibits no mass and no tenderness  Abdominal: Soft  Lymphadenopathy:        Right axillary: No pectoral and no lateral adenopathy present  Left axillary: No pectoral and no lateral adenopathy present  Right: No supraclavicular adenopathy present  Left: No supraclavicular adenopathy present  Neurological: She is alert and oriented to person, place, and time  Psychiatric: She has a normal mood and affect  Results:    Imaging  03/23/2018 right diagnostic mammogram is benign    I reviewed the above imaging data  Discussion/Summary:  71-year-old female status post left mastectomy for a stage IIB carcinoma  She continues on anastrazole  There is no evidence of disease based on examination today or contralateral mammogram   I will see her again in six months or sooner should the need arise

## 2018-04-11 ENCOUNTER — TELEPHONE (OUTPATIENT)
Dept: HEMATOLOGY ONCOLOGY | Facility: CLINIC | Age: 73
End: 2018-04-11

## 2018-04-11 NOTE — TELEPHONE ENCOUNTER
Pt called and indicated that [facility] pharmacy has indicated that the doctor has denied her refill and pt has been on medication for 2014  Needs an update  Doesn't see doctor again November

## 2018-04-12 ENCOUNTER — TELEPHONE (OUTPATIENT)
Dept: HEMATOLOGY ONCOLOGY | Facility: CLINIC | Age: 73
End: 2018-04-12

## 2018-04-12 DIAGNOSIS — Z17.0 MALIGNANT NEOPLASM OF BREAST IN FEMALE, ESTROGEN RECEPTOR POSITIVE, UNSPECIFIED LATERALITY, UNSPECIFIED SITE OF BREAST (HCC): Primary | ICD-10-CM

## 2018-04-12 DIAGNOSIS — C50.919 MALIGNANT NEOPLASM OF BREAST IN FEMALE, ESTROGEN RECEPTOR POSITIVE, UNSPECIFIED LATERALITY, UNSPECIFIED SITE OF BREAST (HCC): Primary | ICD-10-CM

## 2018-04-12 RX ORDER — ANASTROZOLE 1 MG/1
1 TABLET ORAL DAILY
Qty: 90 TABLET | Refills: 1 | Status: SHIPPED | OUTPATIENT
Start: 2018-04-12 | End: 2018-10-03 | Stop reason: SDUPTHER

## 2018-04-12 NOTE — TELEPHONE ENCOUNTER
[facility] in [facility] #[phone] called patient about her Anastrozole said she had it denied by the Dr? Marce Whitaker maybe it needs a prior auth?  Please call patient

## 2018-05-15 DIAGNOSIS — E03.9 HYPOTHYROIDISM: ICD-10-CM

## 2018-05-15 DIAGNOSIS — E78.5 HYPERLIPIDEMIA: ICD-10-CM

## 2018-05-16 ENCOUNTER — TRANSCRIBE ORDERS (OUTPATIENT)
Dept: LAB | Facility: CLINIC | Age: 73
End: 2018-05-16

## 2018-05-16 ENCOUNTER — APPOINTMENT (OUTPATIENT)
Dept: LAB | Facility: CLINIC | Age: 73
End: 2018-05-16
Payer: MEDICARE

## 2018-05-16 DIAGNOSIS — E03.9 HYPOTHYROIDISM: ICD-10-CM

## 2018-05-16 DIAGNOSIS — E78.5 HYPERLIPIDEMIA: ICD-10-CM

## 2018-05-16 LAB
ALBUMIN SERPL BCP-MCNC: 3.6 G/DL (ref 3.5–5)
ALP SERPL-CCNC: 68 U/L (ref 46–116)
ALT SERPL W P-5'-P-CCNC: 29 U/L (ref 12–78)
ANION GAP SERPL CALCULATED.3IONS-SCNC: 8 MMOL/L (ref 4–13)
AST SERPL W P-5'-P-CCNC: 22 U/L (ref 5–45)
BILIRUB SERPL-MCNC: 0.57 MG/DL (ref 0.2–1)
BUN SERPL-MCNC: 15 MG/DL (ref 5–25)
CALCIUM SERPL-MCNC: 8.9 MG/DL (ref 8.3–10.1)
CHLORIDE SERPL-SCNC: 105 MMOL/L (ref 100–108)
CHOLEST SERPL-MCNC: 137 MG/DL (ref 50–200)
CO2 SERPL-SCNC: 26 MMOL/L (ref 21–32)
CREAT SERPL-MCNC: 0.58 MG/DL (ref 0.6–1.3)
GFR SERPL CREATININE-BSD FRML MDRD: 92 ML/MIN/1.73SQ M
GLUCOSE P FAST SERPL-MCNC: 94 MG/DL (ref 65–99)
HDLC SERPL-MCNC: 57 MG/DL (ref 40–60)
LDLC SERPL CALC-MCNC: 64 MG/DL (ref 0–100)
NONHDLC SERPL-MCNC: 80 MG/DL
POTASSIUM SERPL-SCNC: 3.7 MMOL/L (ref 3.5–5.3)
PROT SERPL-MCNC: 7.4 G/DL (ref 6.4–8.2)
SODIUM SERPL-SCNC: 139 MMOL/L (ref 136–145)
TRIGL SERPL-MCNC: 78 MG/DL
TSH SERPL DL<=0.05 MIU/L-ACNC: 4.44 UIU/ML (ref 0.36–3.74)

## 2018-05-16 PROCEDURE — 80053 COMPREHEN METABOLIC PANEL: CPT

## 2018-05-16 PROCEDURE — 36415 COLL VENOUS BLD VENIPUNCTURE: CPT

## 2018-05-16 PROCEDURE — 84443 ASSAY THYROID STIM HORMONE: CPT

## 2018-05-16 PROCEDURE — 80061 LIPID PANEL: CPT

## 2018-05-23 ENCOUNTER — OFFICE VISIT (OUTPATIENT)
Dept: FAMILY MEDICINE CLINIC | Facility: CLINIC | Age: 73
End: 2018-05-23
Payer: MEDICARE

## 2018-05-23 VITALS
DIASTOLIC BLOOD PRESSURE: 70 MMHG | BODY MASS INDEX: 30.38 KG/M2 | SYSTOLIC BLOOD PRESSURE: 138 MMHG | WEIGHT: 177 LBS | HEART RATE: 68 BPM

## 2018-05-23 DIAGNOSIS — M25.562 CHRONIC PAIN OF BOTH KNEES: ICD-10-CM

## 2018-05-23 DIAGNOSIS — E78.5 HYPERLIPIDEMIA, UNSPECIFIED HYPERLIPIDEMIA TYPE: ICD-10-CM

## 2018-05-23 DIAGNOSIS — E03.9 HYPOTHYROIDISM, UNSPECIFIED TYPE: Primary | ICD-10-CM

## 2018-05-23 DIAGNOSIS — M25.561 CHRONIC PAIN OF BOTH KNEES: ICD-10-CM

## 2018-05-23 DIAGNOSIS — G89.29 CHRONIC PAIN OF BOTH KNEES: ICD-10-CM

## 2018-05-23 DIAGNOSIS — Z78.0 MENOPAUSE: ICD-10-CM

## 2018-05-23 DIAGNOSIS — I10 ESSENTIAL HYPERTENSION: ICD-10-CM

## 2018-05-23 PROCEDURE — 99214 OFFICE O/P EST MOD 30 MIN: CPT | Performed by: FAMILY MEDICINE

## 2018-05-23 RX ORDER — ASPIRIN 81 MG/1
81 TABLET ORAL DAILY
COMMUNITY

## 2018-05-23 NOTE — ASSESSMENT & PLAN NOTE
Patient has no symptoms of hypothyroidism, her TSH is slightly elevated but that seemed to be her baseline, patient continued on levothyroxine 25 mcg tablet, I explained to the patient the risk and benefit of increasing her levothyroxine, patient with no symptom preferred to continue on the same dose for right now follow up on a TSH in 6 months  If patient have any symptoms of hypothyroidism to call us immediately to consider increasing her levothyroxine

## 2018-05-23 NOTE — PATIENT INSTRUCTIONS
Knee Exercises   AMBULATORY CARE:   What you need to know about knee exercises:  Knee exercises help strengthen the muscles around your knee  Strong muscles can help reduce pain and decrease your risk of future injury  Knee exercises also help you heal after an injury or surgery  · Start slow  These are beginning exercises  Ask your healthcare provider if you need to see a physical therapist for more advanced exercises  As you get stronger, you may be able to do more sets of each exercise or add weights  · Stop if you feel pain  It is normal to feel some discomfort at first  Regular exercise will help decrease your discomfort over time  · Do the exercises on both legs  Do this so both knees remain strong  · Warm up before you do knee exercises  Walk or ride a stationary bike for 5 or 10 minutes to warm your muscles  How to perform knee stretches safely:  Always stretch before you do strengthening exercises  Do these stretching exercises again after you do the strengthening exercises  Do these stretches 4 or 5 days a week, or as directed  · Standing calf stretch: Face a wall and place both palms flat on the wall, or hold the back of a chair for balance  Keep a slight bend in your knees  Take a big step backward with one leg  Keep your other leg directly under you  Keep both heels flat and press your hips forward  Hold the stretch for 30 seconds, and then relax for 30 seconds  Switch legs  Repeat 2 or 3 times on each leg  · Standing quadriceps stretch:  Stand and place one hand against a wall or hold the back of a chair for balance  With your weight on one leg, bend your other leg and grab your ankle  Bring your heel toward your buttocks  Hold the stretch for 30 to 60 seconds  Switch legs  Repeat 2 or 3 times on each leg  · Sitting hamstring stretch:  Sit with both legs straight in front of you  Do not point or flex your toes   Place your palms on the floor and slide your hands forward until you feel the stretch  Do not round your back  Hold the stretch for 30 seconds  Repeat 2 or 3 times  How to perform knee strengthening exercises safely:  Do these exercises 4 or 5 days a week, or as directed  · Standing half squats:  Stand with your feet shoulder-width apart  Lean your back against a wall or hold the back of a chair for balance, if needed  Slowly sit down about 10 inches, as if you are going to sit in a chair  Your body weight should be mostly over your heels  Hold the squat for 5 seconds, then rise to a standing position  Do 3 sets of 10 squats to strengthen your buttocks and thighs  · Standing hamstring curls: Face a wall and place both palms flat on the wall, or hold the back of a chair for balance  With your weight on one leg, lift your other foot as close to your buttocks as you can  Hold for 5 seconds and then lower your leg  Do 2 sets of 10 curls on each leg  This exercise strengthens the muscles in the back of your thigh  · Standing calf raises:  Face a wall and place both palms flat on the wall, or hold the back of a chair for balance  Stand up straight, and do not lean  Place all your weight on one leg by lifting the other foot off the floor  Raise the heel of the foot that is on the floor as high as you can and then lower it  Do 2 sets of 10 calf raises on each leg to strengthen your calf muscles  · Straight leg lifts:  Lie on your stomach with straight legs  Fold your arms in front of you and rest your head in your arms  Tighten your leg muscles and raise one leg as high as you can  Hold for 5 seconds, then lower your leg  Do 2 sets of 10 lifts on each leg to strengthen your buttocks  · Sitting leg lifts:  Sit in a chair  Slowly straighten and raise one leg  Squeeze your thigh muscles and hold for 5 seconds  Relax and return your foot to the floor  Do 2 sets of 10 lifts on each leg   This helps strengthen the muscles in the front of your thigh  Contact your healthcare provider if:   · You have new pain or your pain becomes worse  · You have questions or concerns about your condition or care  © 2017 [address] [employer] Information is for End User's use only and may not be sold, redistributed or otherwise used for commercial purposes  All illustrations and images included in CareNotes® are the copyrighted property of A D A M [employer] Inc  or Arash Grayson  The above information is an  only  It is not intended as medical advice for individual conditions or treatments  Talk to your doctor, nurse or pharmacist before following any medical regimen to see if it is safe and effective for you

## 2018-05-23 NOTE — PROGRESS NOTES
Assessment/Plan:    Hypothyroidism  Patient has no symptoms of hypothyroidism, her TSH is slightly elevated but that seemed to be her baseline, patient continued on levothyroxine 25 mcg tablet, I explained to the patient the risk and benefit of increasing her levothyroxine, patient with no symptom preferred to continue on the same dose for right now follow up on a TSH in 6 months  If patient have any symptoms of hypothyroidism to call us immediately to consider increasing her levothyroxine  Hypertension  Very stable continue with hydrochlorothiazide  Hyperlipidemia  Stable her numbers are at goals, to continue with pravastatin 40 mg daily  Chronic pain of both knees  Usually related to excess exertion, after prolonged walking or exercising, patient tried to stop the statin with no change or improvement in her symptoms, no evidence of acute inflammation or acute arthritis flare-up, patient can use Tylenol arthritis safely, patient to use NSAIDs for severe pain  Diagnoses and all orders for this visit:    Hypothyroidism, unspecified type  -     TSH, 3rd generation with T4 reflex; Future    Essential hypertension  -     CBC and differential; Future    Hyperlipidemia, unspecified hyperlipidemia type    Menopause    Chronic pain of both knees    Other orders  -     aspirin (ECOTRIN LOW STRENGTH) 81 mg EC tablet; Take 81 mg by mouth daily          Subjective: patient here for a 6 month f/u re: breast cancer, HTN  Patient needs to review bloodwork results  ak     Patient ID: Gustavo Payton is a 68 y o  female  Patient is here for follow-up on her chronic condition include hypertension hyperlipidemia, hypothyroidism  Patient is doing well overall, she had some weight gain but she attributed to diet noncompliance, she denied any fatigue, hair loss, thinning of the hair, cold or heat intolerance, constipation or any other symptoms            The following portions of the patient's history were reviewed and updated as appropriate: allergies, current medications, past family history, past medical history, past social history, past surgical history and problem list     Review of Systems   Constitutional: Negative for appetite change, chills and fever  HENT: Negative for congestion, sore throat and voice change  Eyes: Negative for pain and visual disturbance  Respiratory: Negative for cough  Cardiovascular: Negative for chest pain and palpitations  Gastrointestinal: Negative for abdominal pain, constipation, diarrhea and nausea  Endocrine: Negative for cold intolerance, heat intolerance and polyuria  Genitourinary: Negative for dysuria, enuresis, flank pain and frequency  Musculoskeletal: Negative for gait problem, joint swelling and neck pain  Skin: Negative for color change and wound  Neurological: Negative for dizziness, syncope, speech difficulty, numbness and headaches  Psychiatric/Behavioral: Negative for behavioral problems and confusion  The patient is not nervous/anxious  Objective:    /70   Pulse 68   Wt 80 3 kg (177 lb)   BMI 30 38 kg/m²      Physical Exam   Constitutional: She is oriented to person, place, and time  She appears well-developed and well-nourished  HENT:   Head: Normocephalic and atraumatic  Eyes: Conjunctivae and EOM are normal  Pupils are equal, round, and reactive to light  Neck: Normal range of motion  Neck supple  Cardiovascular: Normal rate, regular rhythm, normal heart sounds and intact distal pulses  Pulmonary/Chest: Effort normal and breath sounds normal    Abdominal: Soft  Bowel sounds are normal    Musculoskeletal: Normal range of motion  Neurological: She is alert and oriented to person, place, and time  She has normal reflexes  Skin: Skin is warm and dry  Psychiatric: She has a normal mood and affect  Her behavior is normal    Vitals reviewed        Recent Results (from the past 1008 hour(s))   Comprehensive metabolic panel    Collection Time: 05/16/18  7:12 AM   Result Value Ref Range    Sodium 139 136 - 145 mmol/L    Potassium 3 7 3 5 - 5 3 mmol/L    Chloride 105 100 - 108 mmol/L    CO2 26 21 - 32 mmol/L    Anion Gap 8 4 - 13 mmol/L    BUN 15 5 - 25 mg/dL    Creatinine 0 58 (L) 0 60 - 1 30 mg/dL    Glucose, Fasting 94 65 - 99 mg/dL    Calcium 8 9 8 3 - 10 1 mg/dL    AST 22 5 - 45 U/L    ALT 29 12 - 78 U/L    Alkaline Phosphatase 68 46 - 116 U/L    Total Protein 7 4 6 4 - 8 2 g/dL    Albumin 3 6 3 5 - 5 0 g/dL    Total Bilirubin 0 57 0 20 - 1 00 mg/dL    eGFR 92 ml/min/1 73sq m   Lipid panel    Collection Time: 05/16/18  7:12 AM   Result Value Ref Range    Cholesterol 137 50 - 200 mg/dL    Triglycerides 78 <=150 mg/dL    HDL, Direct 57 40 - 60 mg/dL    LDL Calculated 64 0 - 100 mg/dL    Non-HDL-Chol (CHOL-HDL) 80 mg/dl   TSH, 3rd generation    Collection Time: 05/16/18  7:12 AM   Result Value Ref Range    TSH 3RD GENERATON 4 440 (H) 0 358 - 3 740 uIU/mL

## 2018-05-23 NOTE — ASSESSMENT & PLAN NOTE
Usually related to excess exertion, after prolonged walking or exercising, patient tried to stop the statin with no change or improvement in her symptoms, no evidence of acute inflammation or acute arthritis flare-up, patient can use Tylenol arthritis safely, patient to use NSAIDs for severe pain

## 2018-08-27 ENCOUNTER — OFFICE VISIT (OUTPATIENT)
Dept: HEMATOLOGY ONCOLOGY | Facility: CLINIC | Age: 73
End: 2018-08-27
Payer: MEDICARE

## 2018-08-27 VITALS
BODY MASS INDEX: 29.88 KG/M2 | OXYGEN SATURATION: 98 % | HEART RATE: 84 BPM | RESPIRATION RATE: 16 BRPM | HEIGHT: 64 IN | SYSTOLIC BLOOD PRESSURE: 122 MMHG | DIASTOLIC BLOOD PRESSURE: 80 MMHG | TEMPERATURE: 98.4 F | WEIGHT: 175 LBS

## 2018-08-27 DIAGNOSIS — C50.919 MALIGNANT NEOPLASM OF FEMALE BREAST, UNSPECIFIED ESTROGEN RECEPTOR STATUS, UNSPECIFIED LATERALITY, UNSPECIFIED SITE OF BREAST (HCC): Primary | ICD-10-CM

## 2018-08-27 DIAGNOSIS — Z17.0 MALIGNANT NEOPLASM OF LEFT BREAST IN FEMALE, ESTROGEN RECEPTOR POSITIVE, UNSPECIFIED SITE OF BREAST (HCC): Primary | ICD-10-CM

## 2018-08-27 DIAGNOSIS — C50.912 MALIGNANT NEOPLASM OF LEFT BREAST IN FEMALE, ESTROGEN RECEPTOR POSITIVE, UNSPECIFIED SITE OF BREAST (HCC): Primary | ICD-10-CM

## 2018-08-27 PROCEDURE — 99214 OFFICE O/P EST MOD 30 MIN: CPT | Performed by: INTERNAL MEDICINE

## 2018-08-27 NOTE — PROGRESS NOTES
Hematology / Oncology Outpatient Follow Up Note    Janie Carrillo 68 y o  female Naeved BENEDICT:[id number]         Date:  8/27/2018    Assessment / Plan:  A 68year old postmenopausal woman with stage IIB left breast cancer, grade 3, ER/VA positive, HER-2 Fish equivocal disease  She underwent mastectomy with axillary lymph node dissection, without reconstruction resulting in the SUNITHA  She has 3 positive lymph nodes  Multiple HER-2 fish test were consistently showed a borderline amplification  She was treated as HER-2 positive disease  She completed adjuvant chemotherapy as well as one year course of adjuvant Herceptin, in June 2015  She is currently on adjuvant hormonal therapy with anastrozole with no side effects  She has no evidence recurrent disease, based on her symptomatology and physical examinations  I recommended her to continue with anastrozole 1 mg once a day  We discussed 10 years versus 5 years of aromatase inhibitor  I would favor 10 years, because of her initial stage as well as lack of side effects from aromatase inhibitor  She is in agreement  I will see her again in a year for routine follow-up  Subjective:     HPI:  A 80year old postmenopausal woman, who underwent regular screening mammography, which showed an abnormality in her left breast  Therefore, she underwent ultrasound-guided biopsy in March 9, 2014, which showed invasive ductal carcinoma  Subsequently, she underwent mastectomy with axillary lymph node dissection in April 4, 2014 which showed a 3 5 cm invasive ductal carcinoma, grade 3  3/11 axillary lymph node were positive for metastatic disease  This was %, VA 10-15% positive, HER-2 2+ disease  HER-2 Fish was equivoal for the gene amplification  I requested pathology Department to set another section for the second HER-2 Fish, which is pending  She presents today to discuss adjuvant treatment options   She is otherwise healthy with past medical history including hypertension, hypothyroidism, and hypercholesterolemia  She has no complaint of pain  Her weight has been stable  She has no respiratory symptoms  She has no family history of breast cancer or ovarian cancer  Her performance status is normal          Interval History:  A 68year old postmenopausal woman, with stage IIB left breast cancer, grade 3, ER positive, HER-2 fish, borderline disease  She had 3 positive lymph nodes, when she underwent mastectomy with lymph node dissection  We have tested her to fish on several occasion, all of which came back as a borderline disease  We decided to treat her as HER-2 positive disease  She completed adjuvant chemotherapy with a c  followed by Taxol and Herceptin  She finished adjuvant Herceptin monotherapy in June 2015 without cardiac toxicity  She is currently on adjuvant hormonal therapy with anastrozole  She came in today for routine follow-up  She continued to do well without any new complaint  She continued to have mild hot flashes  Otherwise, she feels well  She has no musculoskeletal symptoms  She denied bone pain  She denied respiratory symptoms  Her weight is stable  Her performance status is normal       Objective:     Primary Diagnosis:    Left breast cancer  Stage IIB (pT2, N1b, M0) grade 3, ER positive, AR weakly positive, HER-2 fish, borderline disease  Diagnosed in April 2014  Cancer Staging:  Cancer Staging  No matching staging information was found for the patient  Previous Hematologic/ Oncologic Treatment:     1  Adjuvant chemotherapy with dose dense a c  X4 followed by weekly paclitaxel and Herceptin x12  Completed in September 2014  2  postmastectomy radiation therapy completed in December 2014  3  adjuvant Herceptin monotherapy, completed in June 2015  Current Hematologic/ Oncologic Treatment:      1  adjuvant hormonal therapy with anastrozole since October 2014      Disease Status:     SUNITHA status post mastectomy with lymph node dissection  Test Results:    Pathology:    3 5 cm of invasive ductal carcinoma, grade 3  3/11 axillary lymph node positive for metastatic disease  % positive, TN 10-15% positive, HER-2 2+ disease  HER-2 Fish is equivocal  Second, HER-2 Fish was borderline    Stage IIB(pT2, pN1b, M0)  Radiology:    DEXA scan in 2014 showed no evidence of osteopenia or osteoporosis  Mammography on March 2018 was benign  BI-RADS 2  Laboratory:        Physical Exam:      General Appearance:    Alert, oriented        Eyes:    PERRL   Ears:    Normal external ear canals, both ears   Nose:   Nares normal, septum midline   Throat:   Mucosa moist  Pharynx without injection  Neck:   Supple       Lungs:     Clear to auscultation bilaterally   Chest Wall:    No tenderness or deformity    Heart:    Regular rate and rhythm       Abdomen:     Soft, non-tender, bowel sounds +, no organomegaly           Extremities:   Extremities no cyanosis or edema       Skin:   no rash or icterus  Lymph nodes:   Cervical, supraclavicular, and axillary nodes normal   Neurologic:   CNII-XII intact, normal strength, sensation and reflexes     Throughout          Breast exam:   status post left mastectomy without reconstruction  No palpable abnormality in her left chest wall  Right breast exam is negative  ROS: Review of Systems   Constitutional:        Mild hot flashes   All other systems reviewed and are negative  Imaging: No results found        Labs:   Lab Results   Component Value Date    WBC 6 29 04/04/2016    HGB 14 5 04/04/2016    HCT 43 3 04/04/2016    MCV 90 04/04/2016     04/04/2016     Lab Results   Component Value Date     05/16/2018    K 3 7 05/16/2018     05/16/2018    CO2 26 05/16/2018    ANIONGAP 9 09/25/2015    BUN 15 05/16/2018    CREATININE 0 58 (L) 05/16/2018    GLUCOSE 88 09/25/2015    GLUF 94 05/16/2018    CALCIUM 8 9 05/16/2018    AST 22 05/16/2018    ALT 29 05/16/2018    ALKPHOS 68 05/16/2018    PROT 7 1 09/25/2015    BILITOT 0 55 09/25/2015    EGFR 92 05/16/2018         Current Medications: Reviewed  Allergies: Reviewed  PMH/FH/SH:  Reviewed      Vital Sign:    Body surface area is 1 85 meters squared      Wt Readings from Last 3 Encounters:   08/27/18 79 4 kg (175 lb)   05/23/18 80 3 kg (177 lb)   03/28/18 77 6 kg (171 lb)        Temp Readings from Last 3 Encounters:   08/27/18 98 4 °F (36 9 °C) (Tympanic)   09/26/17 98 6 °F (37 °C)   08/28/17 98 °F (36 7 °C)        BP Readings from Last 3 Encounters:   08/27/18 122/80   05/23/18 138/70   03/28/18 128/82         Pulse Readings from Last 3 Encounters:   08/27/18 84   05/23/18 68   03/28/18 68     @LASTSAO2(3)@

## 2018-10-02 ENCOUNTER — OFFICE VISIT (OUTPATIENT)
Dept: SURGICAL ONCOLOGY | Facility: CLINIC | Age: 73
End: 2018-10-02
Payer: MEDICARE

## 2018-10-02 VITALS
TEMPERATURE: 98.3 F | HEIGHT: 64 IN | BODY MASS INDEX: 30.05 KG/M2 | SYSTOLIC BLOOD PRESSURE: 160 MMHG | DIASTOLIC BLOOD PRESSURE: 90 MMHG | WEIGHT: 176 LBS | HEART RATE: 67 BPM | RESPIRATION RATE: 16 BRPM

## 2018-10-02 DIAGNOSIS — Z17.0 MALIGNANT NEOPLASM OF LEFT BREAST IN FEMALE, ESTROGEN RECEPTOR POSITIVE, UNSPECIFIED SITE OF BREAST (HCC): Primary | ICD-10-CM

## 2018-10-02 DIAGNOSIS — C50.912 MALIGNANT NEOPLASM OF LEFT BREAST IN FEMALE, ESTROGEN RECEPTOR POSITIVE, UNSPECIFIED SITE OF BREAST (HCC): Primary | ICD-10-CM

## 2018-10-02 DIAGNOSIS — Z79.811 USE OF ANASTROZOLE (ARIMIDEX): ICD-10-CM

## 2018-10-02 PROBLEM — Z79.899 LONG TERM USE OF DRUG: Status: RESOLVED | Noted: 2018-10-02 | Resolved: 2018-10-02

## 2018-10-02 PROBLEM — R92.8 ABNORMAL FINDINGS ON DIAGNOSTIC IMAGING OF BREAST: Status: RESOLVED | Noted: 2018-10-02 | Resolved: 2018-10-02

## 2018-10-02 PROBLEM — D24.1 FIBROADENOMA OF RIGHT BREAST: Status: RESOLVED | Noted: 2018-10-02 | Resolved: 2018-10-02

## 2018-10-02 PROCEDURE — 99214 OFFICE O/P EST MOD 30 MIN: CPT | Performed by: SURGERY

## 2018-10-02 NOTE — PROGRESS NOTES
Surgical Oncology Follow Up       [facility] SURGICAL ONCOLOGY Abraham Hilario  [address] [place], [place] [address] [address]    Theron Batista  1945  [id number]  [facility] SURGICAL ONCOLOGY FATUMA  [address] [place] [id number]    Chief Complaint   Patient presents with    Breast Cancer     Pt is here for 6 month follow up        Assessment/Plan   Diagnoses and all orders for this visit:    Malignant neoplasm of left breast in female, estrogen receptor positive, unspecified site of breast ([facility] [address] )  -     Mammo diagnostic right w 3d & cad; Future    Use of anastrozole (Arimidex)        Advance Care Planning/Advance Directives:  Did not discuss  with the patient  Oncology History:    Oncology History      Dose Dense AC X 4        Malignant neoplasm of left breast in female, estrogen receptor positive ([facility] [address] )     Initial Diagnosis     Malignant neoplasm of left breast in female, estrogen receptor positive ([facility] [address] )       3/19/2014 Surgery     Left breast US guided core biopsy,  IDC          4/14/2014 Surgery     Left breast mastectomy, SLNB, AND         5/13/2014 Surgery     Placement of mediport         2014 -  Radiation     Post mastectomy Rt  Dr Akilah Crawford  2014 -  Chemotherapy     05/2014  Dose dense AC X 4 cycles  07/2014  Paclitaxel X 12  Herceptin  Dr Claude Junes  Hormone Therapy     Anastrazole  Dr Claude Junes            History of Present Illness: breast cancer follow up   -Interval History: none    Review of Systems:  Review of Systems   Constitutional: Negative  Negative for appetite change and fever  Eyes: Negative  Respiratory: Negative for shortness of breath  Cardiovascular: Negative  Gastrointestinal: Negative  Endocrine: Negative  Genitourinary: Negative  Musculoskeletal: Negative  Negative for arthralgias and myalgias  Skin: Negative  Allergic/Immunologic: Negative  Neurological: Negative  Hematological: Negative  Negative for adenopathy  Does not bruise/bleed easily  Psychiatric/Behavioral: Negative          Patient Active Problem List   Diagnosis    Malignant neoplasm of left breast in female, estrogen receptor positive ([facility] [address] )    Use of anastrozole (Arimidex)    Hyperglycemia    Hyperlipidemia    Hypertension    Hypothyroidism    Menopause    Chronic pain of both knees     Past Medical History:   Diagnosis Date    Abnormal findings on diagnostic imaging of breast     last assessed 3/19/14    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 ([facility] [address] )     last assessed 10/2/15    Disease of thyroid gland     Fibroadenoma of right breast     Hx of radiation therapy     last assessed 17    Hypertension     Hypokalemia     last assessed 4/15/16    Long term use of drug     herceptin,last assessed 17     Past Surgical History:   Procedure Laterality Date    BREAST BIOPSY Left 2014    IDC    BREAST BIOPSY Right 2015    FIBROADENOMA     SECTION      X2    IVC FILTER RETRIEVAL  2014    central iv line with subcutaneous reservoir mediaport    LYMPHADENECTOMY Left 2014    axxillary    MASTECTOMY Left 2014    PORTACATH PLACEMENT  2014    SENTINEL LYMPH NODE BIOPSY Left 2014    AND    TONSILLECTOMY  195    TUBAL LIGATION       Family History   Problem Relation Age of Onset    Heart failure Mother     Coronary artery disease Mother     Diabetes Mother      Social History     Social History    Marital status: /Civil Union     Spouse name: N/A    Number of children: N/A    Years of education: N/A     Occupational History    retired      Social History Main Topics    Smoking status: Never Smoker    Smokeless tobacco: Never Used      Comment: no secondhand smoe exposure    Alcohol use 1 2 - 1 8 oz/week     2 - 3 Glasses of wine per week      Comment: social    Drug use: No    Sexual activity: Not on file     Other Topics Concern    Not on file     Social History Narrative    Exercises daily           Current Outpatient Prescriptions:     anastrozole (ARIMIDEX) 1 mg tablet, Take 1 tablet (1 mg total) by mouth daily, Disp: 90 tablet, Rfl: 1    aspirin (ECOTRIN LOW STRENGTH) 81 mg EC tablet, Take 81 mg by mouth daily, Disp: , Rfl:     Calcium Carbonate-Vitamin D (CALCIUM 600+D) 600-400 MG-UNIT per tablet, Take 2 tablets by mouth daily, Disp: , Rfl:     hydrochlorothiazide (MICROZIDE) 12 5 mg capsule, Take 12 5 mg by mouth daily, Disp: , Rfl: 3    levothyroxine 25 mcg tablet, Take 25 mcg by mouth daily, Disp: , Rfl: 3    Multiple Vitamin (MULTIVITAMINS PO), Take by mouth, Disp: , Rfl:     pravastatin (PRAVACHOL) 40 mg tablet, Take 40 mg by mouth daily, Disp: , Rfl: 3  No Known Allergies    The following portions of the patient's history were reviewed and updated as appropriate: allergies, current medications, past family history, past medical history, past social history, past surgical history and problem list         Vitals:    10/02/18 0951   BP: 160/90   Pulse: 67   Resp: 16   Temp: 98 3 °F (36 8 °C)       Physical Exam   Constitutional: She is oriented to person, place, and time  She appears well-developed and well-nourished  HENT:   Head: Normocephalic and atraumatic  Cardiovascular: Normal heart sounds  Pulmonary/Chest: Breath sounds normal  Right breast exhibits no inverted nipple, no mass, no nipple discharge, no skin change and no tenderness  Left breast exhibits skin change (mastectomy scar)  Left breast exhibits no mass and no tenderness  Abdominal: Soft  Lymphadenopathy:        Right axillary: No pectoral and no lateral adenopathy present  Left axillary: No pectoral and no lateral adenopathy present  Right: No supraclavicular adenopathy present  Left: No supraclavicular adenopathy present     Neurological: She is alert and oriented to person, place, and time  Psychiatric: She has a normal mood and affect  Discussion/Summary:  30-year-old female status post left mastectomy for stage IIB carcinoma  She continues on anastrozole  There is no evidence of disease based on examination today  I will make arrangements for her right-sided mammogram due in March  I will see her again in six months for another exam   At that point if there are no concerns, I will then start to see her on an annual basis as she will be five years out of her diagnosis and surgery

## 2018-10-03 DIAGNOSIS — C50.919 MALIGNANT NEOPLASM OF BREAST IN FEMALE, ESTROGEN RECEPTOR POSITIVE, UNSPECIFIED LATERALITY, UNSPECIFIED SITE OF BREAST (HCC): ICD-10-CM

## 2018-10-03 DIAGNOSIS — E03.9 ACQUIRED HYPOTHYROIDISM: ICD-10-CM

## 2018-10-03 DIAGNOSIS — Z17.0 MALIGNANT NEOPLASM OF BREAST IN FEMALE, ESTROGEN RECEPTOR POSITIVE, UNSPECIFIED LATERALITY, UNSPECIFIED SITE OF BREAST (HCC): ICD-10-CM

## 2018-10-03 DIAGNOSIS — E78.2 MIXED HYPERLIPIDEMIA: ICD-10-CM

## 2018-10-03 DIAGNOSIS — I10 ESSENTIAL HYPERTENSION: Primary | ICD-10-CM

## 2018-10-03 RX ORDER — ANASTROZOLE 1 MG/1
1 TABLET ORAL DAILY
Qty: 90 TABLET | Refills: 1 | Status: SHIPPED | OUTPATIENT
Start: 2018-10-03 | End: 2019-03-21 | Stop reason: SDUPTHER

## 2018-10-04 RX ORDER — HYDROCHLOROTHIAZIDE 12.5 MG/1
CAPSULE, GELATIN COATED ORAL
Qty: 90 CAPSULE | Refills: 3 | Status: SHIPPED | OUTPATIENT
Start: 2018-10-04 | End: 2019-09-21 | Stop reason: SDUPTHER

## 2018-10-04 RX ORDER — LEVOTHYROXINE SODIUM 0.03 MG/1
TABLET ORAL
Qty: 90 TABLET | Refills: 3 | Status: SHIPPED | OUTPATIENT
Start: 2018-10-04 | End: 2019-09-21 | Stop reason: SDUPTHER

## 2018-10-04 RX ORDER — PRAVASTATIN SODIUM 40 MG
TABLET ORAL
Qty: 90 TABLET | Refills: 3 | Status: SHIPPED | OUTPATIENT
Start: 2018-10-04 | End: 2019-09-22 | Stop reason: SDUPTHER

## 2018-10-26 ENCOUNTER — IMMUNIZATION (OUTPATIENT)
Dept: FAMILY MEDICINE CLINIC | Facility: CLINIC | Age: 73
End: 2018-10-26
Payer: MEDICARE

## 2018-10-26 DIAGNOSIS — Z23 NEED FOR INFLUENZA VACCINATION: Primary | ICD-10-CM

## 2018-10-26 PROCEDURE — G0008 ADMIN INFLUENZA VIRUS VAC: HCPCS | Performed by: FAMILY MEDICINE

## 2018-10-26 PROCEDURE — 90662 IIV NO PRSV INCREASED AG IM: CPT | Performed by: FAMILY MEDICINE

## 2018-11-05 ENCOUNTER — CLINICAL SUPPORT (OUTPATIENT)
Dept: RADIATION ONCOLOGY | Facility: CLINIC | Age: 73
End: 2018-11-05
Payer: MEDICARE

## 2018-11-05 ENCOUNTER — RADIATION ONCOLOGY FOLLOW-UP (OUTPATIENT)
Dept: RADIATION ONCOLOGY | Facility: CLINIC | Age: 73
End: 2018-11-05
Attending: RADIOLOGY
Payer: MEDICARE

## 2018-11-05 VITALS
OXYGEN SATURATION: 96 % | BODY MASS INDEX: 30.11 KG/M2 | DIASTOLIC BLOOD PRESSURE: 90 MMHG | TEMPERATURE: 98.6 F | WEIGHT: 176.4 LBS | HEART RATE: 83 BPM | HEIGHT: 64 IN | RESPIRATION RATE: 16 BRPM | SYSTOLIC BLOOD PRESSURE: 140 MMHG

## 2018-11-05 DIAGNOSIS — C50.912 MALIGNANT NEOPLASM OF LEFT BREAST IN FEMALE, ESTROGEN RECEPTOR POSITIVE, UNSPECIFIED SITE OF BREAST (HCC): Primary | ICD-10-CM

## 2018-11-05 DIAGNOSIS — Z17.0 MALIGNANT NEOPLASM OF LEFT BREAST IN FEMALE, ESTROGEN RECEPTOR POSITIVE, UNSPECIFIED SITE OF BREAST (HCC): Primary | ICD-10-CM

## 2018-11-05 PROCEDURE — 99215 OFFICE O/P EST HI 40 MIN: CPT | Performed by: RADIOLOGY

## 2018-11-05 NOTE — PROGRESS NOTES
Follow-up - Radiation Oncology   Katherleen Gaucher 1945 68 y o  female [id number]      History of Present Illness   Cancer Staging  No matching staging information was found for the patient  Stage IIB grade 3 invasive left breast carcinoma  Katherleen Gaucher is a 68y o  year old female with a history of T2, pN1b grade 3 invasive left breast carcinoma status post total mastectomy followed by adjuvant chemotherapy and chest wall with lymph node radiation therapy  She continues to take anastrozole  Interval History:   Patient is 4 years without evidence of disease status post total mastectomy and adjuvant chemotherapy followed by radiation therapy for stage IIB high-grade invasive left breast carcinoma  She continues to take anastrozole and has been advise may go a total of 10 years as per the new guideline  Historical Information   Oncology History             Malignant neoplasm of left breast in female, estrogen receptor positive ([facility] [address] )     Initial Diagnosis     Malignant neoplasm of left breast in female, estrogen receptor positive ([facility] [address] )       3/19/2014 Surgery     Left breast US guided core biopsy,  IDC          4/14/2014 Surgery     Left breast mastectomy, SLNB, AND         10/29/2014 - 12/8/2014 Radiation     Mastectomy scar/left chest wall and the left supraclavicular fossa: 50 4Gy in 28 daily 180cGy fractions  Dr Berkley Lieberman  2014 -  Chemotherapy     05/2014  Dose dense AC X 4 cycles  07/2014  Paclitaxel X 12  Herceptin  Dr Wally Agosto  Hormone Therapy     Anastrazole    Dr Wally Agosto            Past Medical History:   Diagnosis Date    Abnormal findings on diagnostic imaging of breast     last assessed 3/19/14    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 [facility])     last assessed 10/2/15    Disease of thyroid gland     Fibroadenoma of right breast     Hx of radiation therapy     last assessed 9/26/17    Hypertension     Hypokalemia last assessed 4/15/16    Long term use of drug     herceptin,last assessed 17     Past Surgical History:   Procedure Laterality Date    BREAST BIOPSY Left 2014    IDC    BREAST BIOPSY Right 2015    FIBROADENOMA     SECTION      X2    IVC FILTER RETRIEVAL  2014    central iv line with subcutaneous reservoir mediaport    LYMPHADENECTOMY Left 2014    axxillary    MASTECTOMY Left 2014    PORTACATH PLACEMENT  2014    SENTINEL LYMPH NODE BIOPSY Left 2014    AND    TONSILLECTOMY  1956    TUBAL LIGATION         Social History   History   Alcohol Use    1 2 - 1 8 oz/week    2 - 3 Glasses of wine per week     Comment: social     History   Drug Use No     History   Smoking Status    Never Smoker   Smokeless Tobacco    Never Used     Comment: no secondhand smoe exposure         Meds/Allergies     Current Outpatient Prescriptions:     anastrozole (ARIMIDEX) 1 mg tablet, TAKE 1 TABLET (1 MG TOTAL) BY MOUTH DAILY, Disp: 90 tablet, Rfl: 1    aspirin (ECOTRIN LOW STRENGTH) 81 mg EC tablet, Take 81 mg by mouth daily, Disp: , Rfl:     Calcium Carbonate-Vitamin D (CALCIUM 600+D) 600-400 MG-UNIT per tablet, Take 2 tablets by mouth daily, Disp: , Rfl:     hydrochlorothiazide (MICROZIDE) 12 5 mg capsule, TAKE 1 CAPSULE DAILY, Disp: 90 capsule, Rfl: 3    levothyroxine 25 mcg tablet, TAKE 1 TABLET DAILY, Disp: 90 tablet, Rfl: 3    Multiple Vitamin (MULTIVITAMINS PO), Take by mouth, Disp: , Rfl:     pravastatin (PRAVACHOL) 40 mg tablet, TAKE 1 TABLET DAILY AS DIRECTED , Disp: 90 tablet, Rfl: 3  No Known Allergies      Review of Systems    Constitutional: Negative for activity change, appetite change, chills, diaphoresis, fatigue, fever and unexpected weight change     HENT: Negative for congestion, dental problem, drooling, ear discharge, ear pain, facial swelling, hearing loss, mouth sores, nosebleeds, postnasal drip, rhinorrhea, sinus pressure, sneezing, sore throat, tinnitus, trouble swallowing and voice change  Eyes: Negative for photophobia, pain, discharge, redness, itching and visual disturbance  Respiratory: Negative for apnea, cough, choking, chest tightness, shortness of breath, wheezing and stridor  Cardiovascular: Negative for chest pain and palpitations  Gastrointestinal: Negative for abdominal distention, abdominal pain, anal bleeding, blood in stool, constipation, diarrhea, nausea, rectal pain and vomiting  Endocrine: Negative for cold intolerance, heat intolerance, polydipsia, polyphagia and polyuria  Genitourinary: Negative for decreased urine volume, difficulty urinating, dyspareunia, dysuria, enuresis, flank pain, frequency, genital sores, hematuria, menstrual problem, pelvic pain, urgency, vaginal bleeding and vaginal discharge  Musculoskeletal: Negative for arthralgias, back pain, gait problem, joint swelling, myalgias, neck pain and neck stiffness  Skin: Negative for color change, pallor, rash and wound  Allergic/Immunologic: Negative for environmental allergies, food allergies and immunocompromised state  Neurological: Negative for dizziness, tremors, seizures, syncope, facial asymmetry, speech difficulty, weakness, light-headedness, numbness and headaches  Hematological: Negative for adenopathy  Does not bruise/bleed easily  Psychiatric/Behavioral: Negative for agitation, behavioral problems, confusion, decreased concentration, dysphoric mood, hallucinations, self-injury, sleep disturbance and suicidal ideas  The patient is not nervous/anxious and is not hyperactive  OBJECTIVE:   /90   Pulse 83   Temp 98 6 °F (37 °C)   Resp 16   Ht 5' 4" (1 626 m)   Wt 80 kg (176 lb 6 4 oz)   SpO2 96%   BMI 30 28 kg/m²   Pain Assessment:  0  ECOG/Zubrod/WHO: 0 - Asymptomatic    Physical Exam patient is doing well and without any complaints  There are no palpable nodes  Lungs are clear  No abdominal masses    Right breast examination is normal  The left chest wall is fibrotic with no signs of nodules or infiltrates  No edema of the left arm  RESULTS    Lab Results: No results found for this or any previous visit (from the past 672 hour(s))  Imaging Studies:No results found  Assessment/Plan:  No orders of the defined types were placed in this encounter  Follow-up as needed  Daisy Santos is a 68y o  year old female with stage IIB high-grade invasive left breast carcinoma 4 years without evidence of disease following total mastectomy followed by adjuvant chemotherapy and radiation therapy  Dr Ryanne Maynard discussed with her to take hormone Anastrozole for total of 10 years  Elie Ojeda MD  11/5/2018,8:35 AM    Portions of the record may have been created with voice recognition software   Occasional wrong word or "sound a like" substitutions may have occurred due to the inherent limitations of voice recognition software   Read the chart carefully and recognize, using context, where substitutions have occurred

## 2018-11-05 NOTE — PROGRESS NOTES
Saintreyna Bennett  1945   Ms Gagan Connolly is a 68 y o  female       Chief Complaint   Patient presents with    Breast Cancer    Follow-up       Cancer Staging  No matching staging information was found for the patient  Oncology History      Last seen on 11/3/17    3/23/18 Right diagnostic mammogram  IMPRESSION:  No evidence of malignancy  No significant changes when compared with prior studies      ACR BI-RADS® Assessments: BiRad:1 - Negative    8/27/18 Follow up with Dr Glynn He and returns 8/30/2019   continue with anastrozole daily and discussed 10 years versus 5 years of use  "I would favor 10 years, because of her initial stage as well as lack of side effects from aromatase inhibitor  She is in agreement"    10/2/18 Follow up with Dr Bridger Hopson and returns 4/3/19  SUNITHA  Will see in 6 months then start yearly follow up    3/25/19 Right mammogram scheduled               Malignant neoplasm of left breast in female, estrogen receptor positive ([facility] [address] )     Initial Diagnosis     Malignant neoplasm of left breast in female, estrogen receptor positive ([facility] [address] )       3/19/2014 Surgery     Left breast US guided core biopsy,  [facility]          4/14/2014 Surgery     Left breast mastectomy, SLNB, AND         10/29/2014 - 12/8/2014 Radiation     Mastectomy scar/left chest wall and the left supraclavicular fossa: 50 4Gy in 28 daily 180cGy fractions  Dr Chester Milan  2014 -  Chemotherapy     05/2014  Dose dense AC X 4 cycles  07/2014  Paclitaxel X 12  Herceptin  Dr Glynn He  Hormone Therapy     Anastrazole  Dr Glynn He            Clinical Trial: no    Interval History:   Last seen on 11/3/17    3/23/18 Right diagnostic mammogram  IMPRESSION:  No evidence of malignancy  No significant changes when compared with prior studies      ACR BI-RADS® Assessments: BiRad:1 - Negative    8/27/18 Follow up with Dr Glynn He and returns 8/30/2019   continue with anastrozole daily and discussed 10 years versus 5 years of use  "I would favor 10 years, because of her initial stage as well as lack of side effects from aromatase inhibitor  She is in agreement"    10/2/18 Follow up with Dr Lady Knutson and returns 4/3/19  SUNITHA  Will see in 6 months then start yearly follow up    3/25/19 Right mammogram scheduled     Screening  Tobacco  Current tobacco user: no  If yes, brief counseling provided: NA    Hypertension  Hypertension screening performed: yes  Normotensive:  no  If no, referred to PCP: n/a    Depression Screening  Screened for depression using PHQ-2: yes    Screened for depression using PHQ-9:  no  Screening positive or negative:  negative  If score >4, was any of the following actions taken?    Additional evaluation for depression, suicide risk assesment, referral to PCP or psychiatry, medication started:  n/a    Advanced Care Planning for Patients >65 years  Advanced Care Planning Discussed:  yes  Patient named surrogate decision maker or care plan in chart: yes    [unfilled]  Health Maintenance   Topic Date Due    Depression Screening PHQ  1945    CRC Screening: Colonoscopy  1945    DTaP,Tdap,and Td Vaccines (1 - Tdap) 05/09/1967    Fall Risk  05/09/2011    Urinary Incontinence Screening  05/09/2011    Pneumococcal PPSV23/PCV13 65+ Years / High and Highest Risk (2 of 2 - PPSV23) 12/09/2016    INFLUENZA VACCINE  Completed       Patient Active Problem List   Diagnosis    Malignant neoplasm of left breast in female, estrogen receptor positive ([facility] [address] )    Use of anastrozole (Arimidex)    Hyperglycemia    Hyperlipidemia    Hypertension    Hypothyroidism    Menopause    Chronic pain of both knees     Past Medical History:   Diagnosis Date    Abnormal findings on diagnostic imaging of breast     last assessed 3/19/14    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 ([facility] [address] )     last assessed 10/2/15    Disease of thyroid gland     Fibroadenoma of right breast     Hx of radiation therapy     last assessed 17    Hypertension     Hypokalemia     last assessed 4/15/16    Long term use of drug     herceptin,last assessed 17     Past Surgical History:   Procedure Laterality Date    BREAST BIOPSY Left 2014    [facility]    BREAST BIOPSY Right 2015    FIBROADENOMA     SECTION      X2    IVC FILTER RETRIEVAL  2014    central iv line with subcutaneous reservoir mediaport    LYMPHADENECTOMY Left 2014    axxillary    MASTECTOMY Left 2014    PORTACATH PLACEMENT  2014    SENTINEL LYMPH NODE BIOPSY Left 2014    AND    TONSILLECTOMY  195    TUBAL LIGATION       Family History   Problem Relation Age of Onset    Heart failure Mother     Coronary artery disease Mother     Diabetes Mother      Social History     Social History    Marital status: /Civil Union     Spouse name: N/A    Number of children: N/A    Years of education: N/A     Occupational History    retired      Social History Main Topics    Smoking status: Never Smoker    Smokeless tobacco: Never Used      Comment: no secondhand smoe exposure    Alcohol use 1 2 - 1 8 oz/week     2 - 3 Glasses of wine per week      Comment: social    Drug use: No    Sexual activity: Not on file     Other Topics Concern    Not on file     Social History Narrative    Exercises daily           Current Outpatient Prescriptions:     anastrozole (ARIMIDEX) 1 mg tablet, TAKE 1 TABLET (1 MG TOTAL) BY MOUTH DAILY, Disp: 90 tablet, Rfl: 1    aspirin (ECOTRIN LOW STRENGTH) 81 mg EC tablet, Take 81 mg by mouth daily, Disp: , Rfl:     Calcium Carbonate-Vitamin D (CALCIUM 600+D) 600-400 MG-UNIT per tablet, Take 2 tablets by mouth daily, Disp: , Rfl:     hydrochlorothiazide (MICROZIDE) 12 5 mg capsule, TAKE 1 CAPSULE DAILY, Disp: 90 capsule, Rfl: 3    levothyroxine 25 mcg tablet, TAKE 1 TABLET DAILY, Disp: 90 tablet, Rfl: 3    Multiple Vitamin (MULTIVITAMINS PO), Take by mouth, Disp: , Rfl:   pravastatin (PRAVACHOL) 40 mg tablet, TAKE 1 TABLET DAILY AS DIRECTED , Disp: 90 tablet, Rfl: 3  No Known Allergies    Review of Systems:  Review of Systems   Constitutional: Negative  HENT: Negative  Eyes: Negative  Respiratory: Negative  Cardiovascular: Negative  Gastrointestinal: Negative  Endocrine: Negative  Genitourinary: Negative  Musculoskeletal: Positive for arthralgias  Skin: Negative  Allergic/Immunologic: Positive for environmental allergies  Neurological: Negative  Hematological: Negative  Psychiatric/Behavioral: Negative  Vitals:    11/05/18 0813   BP: 140/90   Pulse: 83   Resp: 16   Temp: 98 6 °F (37 °C)   SpO2: 96%   Weight: 80 kg (176 lb 6 4 oz)   Height: 5' 4" (1 626 m)       Pain Score: 0-No pain    Imaging:No results found      Teaching

## 2018-11-23 ENCOUNTER — APPOINTMENT (OUTPATIENT)
Dept: LAB | Facility: CLINIC | Age: 73
End: 2018-11-23
Payer: MEDICARE

## 2018-11-23 DIAGNOSIS — I10 ESSENTIAL HYPERTENSION: ICD-10-CM

## 2018-11-23 DIAGNOSIS — E03.9 HYPOTHYROIDISM, UNSPECIFIED TYPE: ICD-10-CM

## 2018-11-23 LAB
BASOPHILS # BLD AUTO: 0.03 THOUSANDS/ΜL (ref 0–0.1)
BASOPHILS NFR BLD AUTO: 1 % (ref 0–1)
EOSINOPHIL # BLD AUTO: 0.17 THOUSAND/ΜL (ref 0–0.61)
EOSINOPHIL NFR BLD AUTO: 3 % (ref 0–6)
ERYTHROCYTE [DISTWIDTH] IN BLOOD BY AUTOMATED COUNT: 13.5 % (ref 11.6–15.1)
HCT VFR BLD AUTO: 44.3 % (ref 34.8–46.1)
HGB BLD-MCNC: 14.3 G/DL (ref 11.5–15.4)
IMM GRANULOCYTES # BLD AUTO: 0.01 THOUSAND/UL (ref 0–0.2)
IMM GRANULOCYTES NFR BLD AUTO: 0 % (ref 0–2)
LYMPHOCYTES # BLD AUTO: 1.45 THOUSANDS/ΜL (ref 0.6–4.47)
LYMPHOCYTES NFR BLD AUTO: 23 % (ref 14–44)
MCH RBC QN AUTO: 29.6 PG (ref 26.8–34.3)
MCHC RBC AUTO-ENTMCNC: 32.3 G/DL (ref 31.4–37.4)
MCV RBC AUTO: 92 FL (ref 82–98)
MONOCYTES # BLD AUTO: 0.67 THOUSAND/ΜL (ref 0.17–1.22)
MONOCYTES NFR BLD AUTO: 11 % (ref 4–12)
NEUTROPHILS # BLD AUTO: 3.89 THOUSANDS/ΜL (ref 1.85–7.62)
NEUTS SEG NFR BLD AUTO: 62 % (ref 43–75)
NRBC BLD AUTO-RTO: 0 /100 WBCS
PLATELET # BLD AUTO: 284 THOUSANDS/UL (ref 149–390)
PMV BLD AUTO: 10 FL (ref 8.9–12.7)
RBC # BLD AUTO: 4.83 MILLION/UL (ref 3.81–5.12)
T4 FREE SERPL-MCNC: 1 NG/DL (ref 0.76–1.46)
TSH SERPL DL<=0.05 MIU/L-ACNC: 4.21 UIU/ML (ref 0.36–3.74)
WBC # BLD AUTO: 6.22 THOUSAND/UL (ref 4.31–10.16)

## 2018-11-23 PROCEDURE — 36415 COLL VENOUS BLD VENIPUNCTURE: CPT

## 2018-11-23 PROCEDURE — 85025 COMPLETE CBC W/AUTO DIFF WBC: CPT

## 2018-11-23 PROCEDURE — 84443 ASSAY THYROID STIM HORMONE: CPT

## 2018-11-23 PROCEDURE — 84439 ASSAY OF FREE THYROXINE: CPT

## 2018-11-30 ENCOUNTER — OFFICE VISIT (OUTPATIENT)
Dept: FAMILY MEDICINE CLINIC | Facility: CLINIC | Age: 73
End: 2018-11-30
Payer: MEDICARE

## 2018-11-30 VITALS
SYSTOLIC BLOOD PRESSURE: 140 MMHG | HEART RATE: 72 BPM | DIASTOLIC BLOOD PRESSURE: 70 MMHG | BODY MASS INDEX: 29.64 KG/M2 | WEIGHT: 173.6 LBS | HEIGHT: 64 IN

## 2018-11-30 DIAGNOSIS — Z00.00 MEDICARE ANNUAL WELLNESS VISIT, SUBSEQUENT: ICD-10-CM

## 2018-11-30 DIAGNOSIS — M79.10 MYALGIA: ICD-10-CM

## 2018-11-30 DIAGNOSIS — I10 ESSENTIAL HYPERTENSION: Primary | ICD-10-CM

## 2018-11-30 DIAGNOSIS — E78.2 MIXED HYPERLIPIDEMIA: ICD-10-CM

## 2018-11-30 DIAGNOSIS — Z79.811 USE OF ANASTROZOLE (ARIMIDEX): ICD-10-CM

## 2018-11-30 DIAGNOSIS — Z78.0 MENOPAUSE: ICD-10-CM

## 2018-11-30 DIAGNOSIS — E03.9 ACQUIRED HYPOTHYROIDISM: ICD-10-CM

## 2018-11-30 DIAGNOSIS — C50.912 MALIGNANT NEOPLASM OF LEFT BREAST IN FEMALE, ESTROGEN RECEPTOR POSITIVE, UNSPECIFIED SITE OF BREAST (HCC): ICD-10-CM

## 2018-11-30 DIAGNOSIS — Z17.0 MALIGNANT NEOPLASM OF LEFT BREAST IN FEMALE, ESTROGEN RECEPTOR POSITIVE, UNSPECIFIED SITE OF BREAST (HCC): ICD-10-CM

## 2018-11-30 PROCEDURE — 99214 OFFICE O/P EST MOD 30 MIN: CPT | Performed by: FAMILY MEDICINE

## 2018-11-30 PROCEDURE — G0439 PPPS, SUBSEQ VISIT: HCPCS | Performed by: FAMILY MEDICINE

## 2018-11-30 NOTE — PROGRESS NOTES
Assessment and Plan:    Problem List Items Addressed This Visit     None        Health Maintenance Due   Topic Date Due    Hepatitis C Screening  1945    CRC Screening: Colonoscopy  1945    DTaP,Tdap,and Td Vaccines (1 - Tdap) 1967    Urinary Incontinence Screening  2011         HPI:  Daisy Santos is a 68 y o  female here for her Subsequent Wellness Visit      Patient Active Problem List   Diagnosis    Malignant neoplasm of left breast in female, estrogen receptor positive ([facility] [address]    Use of anastrozole (Arimidex)    Hyperglycemia    Hyperlipidemia    Hypertension    Hypothyroidism    Menopause    Chronic pain of both knees     Past Medical History:   Diagnosis Date    Abnormal findings on diagnostic imaging of breast     last assessed 3/19/14    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 ([facility] [address] )     last assessed 10/2/15    Disease of thyroid gland     Fibroadenoma of right breast     Hx of radiation therapy     last assessed 17    Hypertension     Hypokalemia     last assessed 4/15/16    Long term use of drug     herceptin,last assessed 17     Past Surgical History:   Procedure Laterality Date    BREAST BIOPSY Left 2014    [facility]    BREAST BIOPSY Right 2015    FIBROADENOMA     SECTION      X2    IVC FILTER RETRIEVAL  2014    central iv line with subcutaneous reservoir mediaport    LYMPHADENECTOMY Left 2014    axxillary    MASTECTOMY Left 2014    PORTACATH PLACEMENT  2014    SENTINEL LYMPH NODE BIOPSY Left 2014    AND    TONSILLECTOMY  195    TUBAL LIGATION       Family History   Problem Relation Age of Onset    Heart failure Mother     Coronary artery disease Mother     Diabetes Mother      History   Smoking Status    Never Smoker   Smokeless Tobacco    Never Used     Comment: no secondhand smoe exposure     History   Alcohol Use    1 2 - 1 8 oz/week    2 - 3 Glasses of wine per week     Comment: social      History   Drug Use No       Current Outpatient Prescriptions   Medication Sig Dispense Refill    anastrozole (ARIMIDEX) 1 mg tablet TAKE 1 TABLET (1 MG TOTAL) BY MOUTH DAILY 90 tablet 1    aspirin (ECOTRIN LOW STRENGTH) 81 mg EC tablet Take 81 mg by mouth daily      Calcium Carbonate-Vitamin D (CALCIUM 600+D) 600-400 MG-UNIT per tablet Take 2 tablets by mouth daily      hydrochlorothiazide (MICROZIDE) 12 5 mg capsule TAKE 1 CAPSULE DAILY 90 capsule 3    levothyroxine 25 mcg tablet TAKE 1 TABLET DAILY 90 tablet 3    Multiple Vitamin (MULTIVITAMINS PO) Take by mouth      pravastatin (PRAVACHOL) 40 mg tablet TAKE 1 TABLET DAILY AS DIRECTED  90 tablet 3     No current facility-administered medications for this visit  No Known Allergies  Immunization History   Administered Date(s) Administered    Influenza Split High Dose Preservative Free IM 10/02/2015, 10/14/2016, 11/15/2017    Influenza TIV (IM) 1945, 11/02/2014    Influenza, high dose seasonal 0 5 mL 10/26/2018    Pneumococcal Conjugate 13-Valent 10/14/2016    Pneumococcal Polysaccharide PPV23 10/27/2011    Tdap 1945    Zoster 10/02/2013       Patient Care Team:  Arabella Walter MD as PCP - General  MD Margi Berg, MD Garland Wallace MD Stuart Gallon, MD (Radiation Oncology)    Medicare Screening Tests and Risk Assessments:  Fidel Little is here for her Subsequent Wellness visit  Health Risk Assessment:  Patient rates overall health as very good  Patient feels that their physical health rating is Same  Eyesight was rated as Slightly worse  Hearing was rated as Same  Patient feels that their emotional and mental health rating is Same  Pain experienced by patient in the last 7 days has been None  Emotional/Mental Health:  Patient has been feeling nervous/anxious      PHQ-9 Depression Screening:    Frequency of the following problems over the past two weeks:      1  Little interest or pleasure in doing things: 0 - not at all      2  Feeling down, depressed, or hopeless: 0 - not at all  PHQ-2 Score: 0          Broken Bones/Falls: Fall Risk Assessment:    In the past year, patient has experienced: No history of falling in past year          Bladder/Bowel:  Patient has not leaked urine accidently in the last six months  Patient reports no loss of bowel control  Immunizations:  Patient has had a flu vaccination within the last year  Patient has received a pneumonia shot  Patient has received a shingles shot  Home Safety:  Patient does not have trouble with stairs inside or outside of their home  Patient currently reports that there are no safety hazards present in home, working smoke alarms, working carbon monoxide detectors  Preventative Screenings:   Breast cancer screening performed, cholesterol screen completed, glaucoma eye exam completed,     Nutrition:  Current diet: Regular with servings of the following:    Medications:  Patient is currently taking over-the-counter supplements  Patient is able to manage medications  Lifestyle Choices:  Patient reports no tobacco use  Patient has not smoked or used tobacco in the past   Patient reports alcohol use  Patient drives a vehicle  Patient wears seat belt  Activities of Daily Living:  Can get out of bed by his or her self, able to dress self, able to make own meals, able to do own shopping, able to bathe self, can do own laundry/housekeeping, can manage own money, pay bills and track expenses    Previous Hospitalizations:  No hospitalization or ED visit in past 12 months        Advanced Directives:  Patient has decided on a power of   Patient has spoken to designated power of   Patient has completed advanced directive          Preventative Screening/Counseling:      Cardiovascular:      General: Screening Not Indicated          Diabetes:      General: Screening Current          Colorectal Cancer:      General: Screening Current          Breast Cancer:      General: Screening Current      Comments: Positive breast cancer history        Cervical Cancer:      General: Risks and Benefits Discussed and Screening Current      Comments: Dr Karen Main        Osteoporosis:      General: Screening Current and Risks and Benefits Discussed      Due for studies: DXA Axial      Comments: 5/2019 next DEXA        AAA:      General: Screening Not Indicated          Glaucoma:      Referrals: Ophthalmology and Optometry      Comments: Sees Dr Vinny Nice        HIV:      General: Screening Not Indicated          Hepatitis C:      General: Screening Not Indicated        Advanced Directives:   Patient has living will for healthcare, has durable POA for healthcare, patient has an advanced directive

## 2018-11-30 NOTE — PATIENT INSTRUCTIONS
Obesity   AMBULATORY CARE:   Obesity  is when your body mass index (BMI) is greater than 30  Your healthcare provider will use your height and weight to measure your BMI  The risks of obesity include  many health problems, such as injuries or physical disability  You may need tests to check for the following:  · Diabetes     · High blood pressure or high cholesterol     · Heart disease     · Gallbladder or liver disease     · Cancer of the colon, breast, prostate, liver, or kidney     · Sleep apnea     · Arthritis or gout  Seek care immediately if:   · You have a severe headache, confusion, or difficulty speaking  · You have weakness on one side of your body  · You have chest pain, sweating, or shortness of breath  Contact your healthcare provider if:   · You have symptoms of gallbladder or liver disease, such as pain in your upper abdomen  · You have knee or hip pain and discomfort while walking  · You have symptoms of diabetes, such as intense hunger and thirst, and frequent urination  · You have symptoms of sleep apnea, such as snoring or daytime sleepiness  · You have questions or concerns about your condition or care  Treatment for obesity  focuses on helping you lose weight to improve your health  Even a small decrease in BMI can reduce the risk for many health problems  Your healthcare provider will help you set a weight-loss goal   · Lifestyle changes  are the first step in treating obesity  These include making healthy food choices and getting regular physical activity  Your healthcare provider may suggest a weight-loss program that involves coaching, education, and therapy  · Medicine  may help you lose weight when it is used with a healthy diet and physical activity  · Surgery  can help you lose weight if you are very obese and have other health problems  There are several types of weight-loss surgery  Ask your healthcare provider for more information    Be successful losing weight:   · Set small, realistic goals  An example of a small goal is to walk for 20 minutes 5 days a week  Anther goal is to lose 5% of your body weight  · Tell friends, family members, and coworkers about your goals  and ask for their support  Ask a friend to lose weight with you, or join a weight-loss support group  · Identify foods or triggers that may cause you to overeat , and find ways to avoid them  Remove tempting high-calorie foods from your home and workplace  Place a bowl of fresh fruit on your kitchen counter  If stress causes you to eat, then find other ways to cope with stress  · Keep a diary to track what you eat and drink  Also write down how many minutes of physical activity you do each day  Weigh yourself once a week and record it in your diary  Eating changes: You will need to eat 500 to 1,000 fewer calories each day than you currently eat to lose 1 to 2 pounds a week  The following changes will help you cut calories:  · Eat smaller portions  Use small plates, no larger than 9 inches in diameter  Fill your plate half full of fruits and vegetables  Measure your food using measuring cups until you know what a serving size looks like  · Eat 3 meals and 1 or 2 snacks each day  Plan your meals in advance  Thuy Kimball and eat at home most of the time  Eat slowly  · Eat fruits and vegetables at every meal   They are low in calories and high in fiber, which makes you feel full  Do not add butter, margarine, or cream sauce to vegetables  Use herbs to season steamed vegetables  · Eat less fat and fewer fried foods  Eat more baked or grilled chicken and fish  These protein sources are lower in calories and fat than red meat  Limit fast food  Dress your salads with olive oil and vinegar instead of bottled dressing  · Limit the amount of sugar you eat  Do not drink sugary beverages  Limit alcohol  Activity changes:  Physical activity is good for your body in many ways   It helps you burn calories and build strong muscles  It decreases stress and depression, and improves your mood  It can also help you sleep better  Talk to your healthcare provider before you begin an exercise program   · Exercise for at least 30 minutes 5 days a week  Start slowly  Set aside time each day for physical activity that you enjoy and that is convenient for you  It is best to do both weight training and an activity that increases your heart rate, such as walking, bicycling, or swimming  · Find ways to be more active  Do yard work and housecleaning  Walk up the stairs instead of using elevators  Spend your leisure time going to events that require walking, such as outdoor festivals or fairs  This extra physical activity can help you lose weight and keep it off  Follow up with your healthcare provider as directed: You may need to meet with a dietitian  Write down your questions so you remember to ask them during your visits  © 2017 [address] Information is for End User's use only and may not be sold, redistributed or otherwise used for commercial purposes  All illustrations and images included in CareNotes® are the copyrighted property of [employer]  or Arash Grayson  The above information is an  only  It is not intended as medical advice for individual conditions or treatments  Talk to your doctor, nurse or pharmacist before following any medical regimen to see if it is safe and effective for you  Urinary Incontinence   WHAT YOU NEED TO KNOW:   What is urinary incontinence? Urinary incontinence (UI) is when you lose control of your bladder  What causes UI? UI occurs because your bladder cannot store or empty urine properly  The following are the most common types of UI:  · Stress incontinence  is when you leak urine due to increased bladder pressure  This may happen when you cough, sneeze, or exercise       · Urge incontinence  is when you feel the need to urinate right away and leak urine accidentally  · Mixed incontinence  is when you have both stress and urge UI  What are the signs and symptoms of UI?   · You feel like your bladder does not empty completely when you urinate  · You urinate often and need to urinate immediately  · You leak urine when you sleep, or you wake up with the urge to urinate  · You leak urine when you cough, sneeze, exercise, or laugh  How is UI diagnosed? Your healthcare provider will ask how often you leak urine and whether you have stress or urge symptoms  Tell him which medicines you take, how often you urinate, and how much liquid you drink each day  You may need any of the following tests:  · Urine tests  may show infection or kidney function  · A pelvic exam  may be done to check for blockages  A pelvic exam will also show if your bladder, uterus, or other organs have moved out of place  · An x-ray, ultrasound, or CT  may show problems with parts of your urinary system  You may be given contrast liquid to help your organs show up better in the pictures  Tell the healthcare provider if you have ever had an allergic reaction to contrast liquid  Do not enter the MRI room with anything metal  Metal can cause serious injury  Tell the healthcare provider if you have any metal in or on your body  · A bladder scan  will show how much urine is left in your bladder after you urinate  You will be asked to urinate and then healthcare providers will use a small ultrasound machine to check the urine left in your bladder  · Cystometry  is used to check the function of your urinary system  Your healthcare provider checks the pressure in your bladder while filling it with fluid  Your bladder pressure may also be tested when your bladder is full and while you urinate  How is UI treated? · Medicines  can help strengthen your bladder control      · Electrical stimulation  is used to send a small amount of electrical energy to your pelvic floor muscles  This helps control your bladder function  Electrodes may be placed outside your body or in your rectum  For women, the electrodes may be placed in the vagina  · A bulking agent  may be injected into the wall of your urethra to make it thicker  This helps keep your urethra closed and decreases urine leakage  · Devices  such as a clamp, pessary, or tampon may help stop urine leaks  Ask your healthcare provider for more information about these and other devices  · Surgery  may be needed if other treatments do not work  Several types of surgery can help improve your bladder control  Ask your healthcare provider for more information about the surgery you may need  How can I manage my symptoms? · Do pelvic muscle exercises often  Your pelvic muscles help you stop urinating  Squeeze these muscles tight for 5 seconds, then relax for 5 seconds  Gradually work up to squeezing for 10 seconds  Do 3 sets of 15 repetitions a day, or as directed  This will help strengthen your pelvic muscles and improve bladder control  · A catheter  may be used to help empty your bladder  A catheter is a tiny, plastic tube that is put into your bladder to drain your urine  Your healthcare provider may tell you to use a catheter to prevent your bladder from getting too full and leaking urine  · Keep a UI record  Write down how often you leak urine and how much you leak  Make a note of what you were doing when you leaked urine  · Train your bladder  Go to the bathroom at set times, such as every 2 hours, even if you do not feel the urge to go  You can also try to hold your urine when you feel the urge to go  For example, hold your urine for 5 minutes when you feel the urge to go  As that becomes easier, hold your urine for 10 minutes  · Drink liquids as directed  Ask your healthcare provider how much liquid to drink each day and which liquids are best for you   You may need to limit the amount of liquid you drink to help control your urine leakage  Limit or do not have drinks that contain caffeine or alcohol  Do not drink any liquid right before you go to bed  · Prevent constipation  Eat a variety of high-fiber foods  Good examples are high-fiber cereals, beans, vegetables, and whole-grain breads  Prune juice may help make your bowel movement softer  Walking is the best way to trigger your intestines to have a bowel movement  · Exercise regularly and maintain a healthy weight  Ask your healthcare provider how much you should weigh and about the best exercise plan for you  Weight loss and exercise will decrease pressure on your bladder and help you control your leakage  Ask him to help you create a weight loss plan if you are overweight  When should I seek immediate care? · You have severe pain  · You are confused or cannot think clearly  When should I contact my healthcare provider? · You have a fever  · You see blood in your urine  · You have pain when you urinate  · You have new or worse pain, even after treatment  · Your mouth feels dry or you have vision changes  · Your urine is cloudy or smells bad  · You have questions or concerns about your condition or care  CARE AGREEMENT:   You have the right to help plan your care  Learn about your health condition and how it may be treated  Discuss treatment options with your caregivers to decide what care you want to receive  You always have the right to refuse treatment  The above information is an  only  It is not intended as medical advice for individual conditions or treatments  Talk to your doctor, nurse or pharmacist before following any medical regimen to see if it is safe and effective for you  © 2017 [address] [address] Information is for End User's use only and may not be sold, redistributed or otherwise used for commercial purposes   All illustrations and images included in CareNotes® are the copyrighted property of [employer]  or Arash Grayson  Cigarette Smoking and Your Health   AMBULATORY CARE:   Risks to your health if you smoke:  Nicotine and other chemicals found in tobacco damage every cell in your body  Even if you are a light smoker, you have an increased risk for cancer, heart disease, and lung disease  If you are pregnant or have diabetes, smoking increases your risk for complications  Benefits to your health if you stop smoking:   · You decrease respiratory symptoms such as coughing, wheezing, and shortness of breath  · You reduce your risk for cancers of the lung, mouth, throat, kidney, bladder, pancreas, stomach, and cervix  If you already have cancer, you increase the benefits of chemotherapy  You also reduce your risk for cancer returning or a second cancer from developing  · You reduce your risk for heart disease, blood clots, heart attack, and stroke  · You reduce your risk for lung infections, and diseases such as pneumonia, asthma, chronic bronchitis, and emphysema  · Your circulation improves  More oxygen can be delivered to your body  If you have diabetes, you lower your risk for complications, such as kidney, artery, and eye diseases  You also lower your risk for nerve damage  Nerve damage can lead to amputations, poor vision, and blindness  · You improve your body's ability to heal and to fight infections  Benefits to the health of others if you stop smoking:  Tobacco is harmful to nonsmokers who breathe in your secondhand smoke  The following are ways the health of others around you may improve when you stop smoking:  · You lower the risks for lung cancer and heart disease in nonsmoking adults  · If you are pregnant, you lower the risk for miscarriage, early delivery, low birth weight, and stillbirth  You also lower your baby's risk for SIDS, obesity, developmental delay, and neurobehavioral problems, such as ADHD  · If you have children, you lower their risk for ear infections, colds, pneumonia, bronchitis, and asthma  For more information and support to stop smoking:   · Smokefree  gov  Phone: 5- 299 - 060-2846  Web Address: www smokefree  gov  Follow up with your healthcare provider as directed:  Write down your questions so you remember to ask them during your visits  © 2017 [address] Information is for End User's use only and may not be sold, redistributed or otherwise used for commercial purposes  All illustrations and images included in CareNotes® are the copyrighted property of [employer]  or Arash Grayson  The above information is an  only  It is not intended as medical advice for individual conditions or treatments  Talk to your doctor, nurse or pharmacist before following any medical regimen to see if it is safe and effective for you  Fall Prevention   AMBULATORY CARE:   Fall prevention  includes ways to make your home and other areas safer  It also includes ways you can move more carefully to prevent a fall  Health conditions that cause changes in your blood pressure, vision, or muscle strength and coordination may increase your risk for falls  Medicines may also increase your risk for falls if they make you dizzy, weak, or sleepy  Call 911 or have someone else call if:   · You have fallen and are unconscious  · You have fallen and cannot move part of your body  Contact your healthcare provider if:   · You have fallen and have pain or a headache  · You have questions or concerns about your condition or care  Fall prevention tips:   · Stand or sit up slowly  This may help you keep your balance and prevent falls  · Use assistive devices as directed  Your healthcare provider may suggest that you use a cane or walker to help you keep your balance  You may need to have grab bars put in your bathroom near the toilet or in the shower      · Wear shoes that fit well and have soles that   Wear shoes both inside and outside  Use slippers with good   Do not wear shoes with high heels  · Wear a personal alarm  This is a device that allows you to call 911 if you fall and need help  Ask your healthcare provider for more information  · Stay active  Exercise can help strengthen your muscles and improve your balance  Your healthcare provider may recommend water aerobics or walking  He or she may also recommend physical therapy to improve your coordination  Never start an exercise program without talking to your healthcare provider first      · Manage your medical conditions  Keep all appointments with your healthcare providers  Visit your eye doctor as directed  Home safety tips:   · Add items to prevent falls in the bathroom  Put nonslip strips on your bath or shower floor to prevent you from slipping  Use a bath mat if you do not have carpet in the bathroom  This will prevent you from falling when you step out of the bath or shower  Use a shower seat so you do not need to stand while you shower  Sit on the toilet or a chair in your bathroom to dry yourself and put on clothing  This will prevent you from losing your balance from drying or dressing yourself while you are standing  · Keep paths clear  Remove books, shoes, and other objects from walkways and stairs  Place cords for telephones and lamps out of the way so that you do not need to walk over them  Tape them down if you cannot move them  Remove small rugs  If you cannot remove a rug, secure it with double-sided tape  This will prevent you from tripping  · Install bright lights in your home  Use night lights to help light paths to the bathroom or kitchen  Always turn on the light before you start walking  · Keep items you use often on shelves within reach  Do not use a step stool to help you reach an item  · Paint or place reflective tape on the edges of your stairs    This will help you see the stairs better  Follow up with your healthcare provider as directed:  Write down your questions so you remember to ask them during your visits  © 2017 [address] Information is for End User's use only and may not be sold, redistributed or otherwise used for commercial purposes  All illustrations and images included in CareNotes® are the copyrighted property of [employer]  or Arash Grayson  The above information is an  only  It is not intended as medical advice for individual conditions or treatments  Talk to your doctor, nurse or pharmacist before following any medical regimen to see if it is safe and effective for you  Advance Directives   WHAT YOU NEED TO KNOW:   What are advance directives? Advance directives are legal documents that state your wishes and plans for medical care  These plans are made ahead of time in case you lose your ability to make decisions for yourself  Advance directives can apply to any medical decision, such as the treatments you want, and if you want to donate organs  What are the types of advance directives? There are many types of advance directives, and each state has rules about how to use them  You may choose a combination of any of the following:  · Living will: This is a written record of the treatment you want  You can also choose which treatments you do not want, which to limit, and which to stop at a certain time  This includes surgery, medicine, IV fluid, and tube feedings  · Durable power of  for healthcare [facility]): This is a written record that states who you want to make healthcare choices for you when you are unable to make them for yourself  This person, called a proxy, is usually a family member or a friend  You may choose more than 1 proxy  · Do not resuscitate (DNR) order:  A DNR order is used in case your heart stops beating or you stop breathing   It is a request not to have certain forms of treatment, such as CPR  A DNR order may be included in other types of advance directives  · Medical directive: This covers the care that you want if you are in a coma, near death, or unable to make decisions for yourself  You can list the treatments you want for each condition  Treatment may include pain medicine, surgery, blood transfusions, dialysis, IV or tube feedings, and a ventilator (breathing machine)  · Values history: This document has questions about your views, beliefs, and how you feel and think about life  This information can help others choose the care that you would choose  Why are advance directives important? An advance directive helps you control your care  Although spoken wishes may be used, it is better to have your wishes written down  Spoken wishes can be misunderstood, or not followed  Treatments may be given even if you do not want them  An advance directive may make it easier for your family to make difficult choices about your care  How do I decide what to put in my advance directives? · Make informed decisions:  Make sure you fully understand treatments or care you may receive  Think about the benefits and problems your decisions could cause for you or your family  Talk to healthcare providers if you have concerns or questions before you write down your wishes  You may also want to talk with your Jain or , or a   Check your state laws to make sure that what you put in your advance directive is legal      · Sign all forms:  Sign and date your advance directive when you have finished  You may also need 2 witnesses to sign the forms  Witnesses cannot be your doctor or his staff, your spouse, heirs or beneficiaries, people you owe money to, or your chosen proxy  Talk to your family, proxy, and healthcare providers about your advance directive  Give each person a copy, and keep one for yourself in a place you can get to easily   Do not keep it hidden or locked away  · Review and revise your plans: You can revise your advance directive at any time, as long as you are able to make decisions  Review your plan every year, and when there are changes in your life, or your health  When you make changes, let your family, proxy, and healthcare providers know  Give each a new copy  Where can I find more information? · [employer]  [address] , [place] [address]  Phone: 4- 251 - [phone]  Phone: 5- 184 - [phone]  Web Address: http://www  aafp org  · [address] [facility])  [address] [address] [facility], [address] [facility] , [address] [place] [address]  Phone: 4- [phone]  Phone: [phone]  Web Address: Adrian montemayor  CARE AGREEMENT:   You have the right to help plan your care  To help with this plan, you must learn about your health condition and treatment options  You must also learn about advance directives and how they are used  Work with your healthcare providers to decide what care will be used to treat you  You always have the right to refuse treatment  The above information is an  only  It is not intended as medical advice for individual conditions or treatments  Talk to your doctor, nurse or pharmacist before following any medical regimen to see if it is safe and effective for you  © 2017 [address]  Information is for End User's use only and may not be sold, redistributed or otherwise used for commercial purposes  All illustrations and images included in CareNotes® are the copyrighted property of [employer]  or Arash Grayson  Problem List Items Addressed This Visit        Endocrine    Hypothyroidism     Patient does not have any hypothyroid symptoms  Her TSH was only minimally elevated  Based on the fact that she is doing quite well, no changes are recommended              Cardiovascular and Mediastinum    Hypertension - Primary     Based on the patient's home blood pressures, no changes will be made  Re-evaluate in 6 months  Other    Malignant neoplasm of left breast in female, estrogen receptor positive ([address] )     Continue to follow with Hematology/Oncology, as well as breast surgeon  No changes at the moment  Relevant Orders    DXA bone density spine hip and pelvis    Use of anastrozole (Arimidex)     Stable  Follow with Hematology/Oncology, as well as Surgical Oncology  Relevant Orders    DXA bone density spine hip and pelvis    Hyperlipidemia     Cholesterol was not drawn with most recent labs  Will order for current, as well as 6 months from now  Relevant Orders    Cholesterol, total    Comprehensive metabolic panel    HDL cholesterol    LDL cholesterol, direct    Comprehensive metabolic panel    Lipid panel    Menopause     Check DEXA scan  This should be May of 2019 or later  Relevant Orders    DXA bone density spine hip and pelvis    Myalgia     Patient does have some myalgias, however they do not appear to be secondary to statin use  Tylenol or Aleve or Advil p r n  Would be reasonable             Other Visit Diagnoses     Medicare annual wellness visit, subsequent

## 2018-11-30 NOTE — ASSESSMENT & PLAN NOTE
Patient does have some myalgias, however they do not appear to be secondary to statin use  Tylenol or Aleve or Advil p r n  Would be reasonable

## 2018-11-30 NOTE — ASSESSMENT & PLAN NOTE
Patient does not have any hypothyroid symptoms  Her TSH was only minimally elevated  Based on the fact that she is doing quite well, no changes are recommended

## 2018-11-30 NOTE — ASSESSMENT & PLAN NOTE
Cholesterol was not drawn with most recent labs  Will order for current, as well as 6 months from now

## 2018-11-30 NOTE — PROGRESS NOTES
Assessment/Plan:    Hyperlipidemia  Cholesterol was not drawn with most recent labs  Will order for current, as well as 6 months from now  Hypertension  Based on the patient's home blood pressures, no changes will be made  Re-evaluate in 6 months  Hypothyroidism  Patient does not have any hypothyroid symptoms  Her TSH was only minimally elevated  Based on the fact that she is doing quite well, no changes are recommended  Malignant neoplasm of left breast in female, estrogen receptor [facility] ([facility] )  Continue to follow with Hematology/Oncology, as well as breast surgeon  No changes at the moment  Menopause  Check DEXA scan  This should be May of 2019 or later  Myalgia  Patient does have some myalgias, however they do not appear to be secondary to statin use  Tylenol or Aleve or Advil p r n  Would be reasonable  Use of anastrozole (Arimidex)  Stable  Follow with Hematology/Oncology, as well as Surgical Oncology  Diagnoses and all orders for this visit:    Essential hypertension    Acquired hypothyroidism    Malignant neoplasm of left breast in female, estrogen receptor positive, unspecified site of breast ([facility] )  -     DXA bone density spine hip and pelvis; Future    Mixed hyperlipidemia  -     Cholesterol, total; Future  -     Comprehensive metabolic panel; Future  -     HDL cholesterol; Future  -     LDL cholesterol, direct; Future  -     Comprehensive metabolic panel; Future  -     Lipid panel; Future    Myalgia    Use of anastrozole (Arimidex)  -     DXA bone density spine hip and pelvis; Future    Menopause  -     DXA bone density spine hip and pelvis; Future          Subjective:   CC: 6 month follow up for chronic conditions and to review blood work  Mary Salvador     Patient ID: Livia Costello is a 68 y o  female  Reviewed symptoms of hypothyroid  Patient currently has none  TSH is slightly elevated, but about the same as what it has been    The a  She is currently taking Synthroid at 25 mcg  She is on pravastatin for cholesterol  Denies any current problems  With regard to breast cancer, patient does see breast surgeon approximately every 6 months  She follows yearly with Hematology/Oncology  Radiation Oncology has since discharged her  With regard to hypertension, the patient does check her blood pressures at home  She reports they are in the 120s  Her only medication is hydrochlorothiazide  Patient did mention that occasionally she has some aches  She wondered what she should try for that  She has not tried any medications up to this point  The XR in her legs, is after she has gone up and down stairs quite frequently, or for a longer walk such as at a park  The following portions of the patient's history were reviewed and updated as appropriate: allergies, current medications, past family history, past medical history, past social history, past surgical history and problem list     Review of Systems   Constitutional: Negative  HENT: Negative  Eyes: Negative  Respiratory: Negative  Cardiovascular: Negative  Gastrointestinal: Negative  Endocrine: Negative  Genitourinary: Negative  Musculoskeletal: Negative  Skin: Negative  Allergic/Immunologic: Negative  Neurological: Negative  Hematological: Negative  Psychiatric/Behavioral: Negative  CBC was normal   TSH 4 210   T4 1 00  Objective:      Vitals:    11/30/18 0747   BP: 140/70   BP Location: Left arm   Patient Position: Sitting   Pulse: 72   Weight: 78 7 kg (173 lb 9 6 oz)   Height: 5' 4 25" (1 632 m)            Physical Exam   Constitutional: She appears well-developed and well-nourished  HENT:   Head: Normocephalic and atraumatic  Cardiovascular: Normal rate, regular rhythm and normal heart sounds  Pulses:       Carotid pulses are 2+ on the right side, and 2+ on the left side    Pulmonary/Chest: Effort normal and breath sounds normal  She has no wheezes  She has no rales  She exhibits no tenderness  Nursing note and vitals reviewed

## 2018-12-03 ENCOUNTER — LAB (OUTPATIENT)
Dept: LAB | Facility: CLINIC | Age: 73
End: 2018-12-03
Payer: MEDICARE

## 2018-12-03 DIAGNOSIS — E78.2 MIXED HYPERLIPIDEMIA: ICD-10-CM

## 2018-12-03 LAB
ALBUMIN SERPL BCP-MCNC: 3.6 G/DL (ref 3.5–5)
ALP SERPL-CCNC: 68 U/L (ref 46–116)
ALT SERPL W P-5'-P-CCNC: 28 U/L (ref 12–78)
ANION GAP SERPL CALCULATED.3IONS-SCNC: 7 MMOL/L (ref 4–13)
AST SERPL W P-5'-P-CCNC: 15 U/L (ref 5–45)
BILIRUB SERPL-MCNC: 0.48 MG/DL (ref 0.2–1)
BUN SERPL-MCNC: 15 MG/DL (ref 5–25)
CALCIUM SERPL-MCNC: 8.7 MG/DL (ref 8.3–10.1)
CHLORIDE SERPL-SCNC: 106 MMOL/L (ref 100–108)
CHOLEST SERPL-MCNC: 143 MG/DL (ref 50–200)
CO2 SERPL-SCNC: 27 MMOL/L (ref 21–32)
CREAT SERPL-MCNC: 0.59 MG/DL (ref 0.6–1.3)
GFR SERPL CREATININE-BSD FRML MDRD: 92 ML/MIN/1.73SQ M
GLUCOSE P FAST SERPL-MCNC: 95 MG/DL (ref 65–99)
HDLC SERPL-MCNC: 48 MG/DL (ref 40–60)
LDLC SERPL DIRECT ASSAY-MCNC: 85 MG/DL (ref 0–100)
POTASSIUM SERPL-SCNC: 3.6 MMOL/L (ref 3.5–5.3)
PROT SERPL-MCNC: 7.4 G/DL (ref 6.4–8.2)
SODIUM SERPL-SCNC: 140 MMOL/L (ref 136–145)

## 2018-12-03 PROCEDURE — 83721 ASSAY OF BLOOD LIPOPROTEIN: CPT

## 2018-12-03 PROCEDURE — 82465 ASSAY BLD/SERUM CHOLESTEROL: CPT

## 2018-12-03 PROCEDURE — 80053 COMPREHEN METABOLIC PANEL: CPT

## 2018-12-03 PROCEDURE — 36415 COLL VENOUS BLD VENIPUNCTURE: CPT

## 2018-12-03 PROCEDURE — 83718 ASSAY OF LIPOPROTEIN: CPT

## 2019-03-21 DIAGNOSIS — C50.919 MALIGNANT NEOPLASM OF BREAST IN FEMALE, ESTROGEN RECEPTOR POSITIVE, UNSPECIFIED LATERALITY, UNSPECIFIED SITE OF BREAST (HCC): ICD-10-CM

## 2019-03-21 DIAGNOSIS — Z17.0 MALIGNANT NEOPLASM OF BREAST IN FEMALE, ESTROGEN RECEPTOR POSITIVE, UNSPECIFIED LATERALITY, UNSPECIFIED SITE OF BREAST (HCC): ICD-10-CM

## 2019-03-21 RX ORDER — ANASTROZOLE 1 MG/1
TABLET ORAL
Qty: 90 TABLET | Refills: 1 | Status: SHIPPED | OUTPATIENT
Start: 2019-03-21 | End: 2019-09-13 | Stop reason: SDUPTHER

## 2019-03-25 ENCOUNTER — HOSPITAL ENCOUNTER (OUTPATIENT)
Dept: MAMMOGRAPHY | Facility: CLINIC | Age: 74
Discharge: HOME | End: 2019-03-25
Payer: MEDICARE

## 2019-03-25 VITALS — BODY MASS INDEX: 29.53 KG/M2 | WEIGHT: 173 LBS | HEIGHT: 64 IN

## 2019-03-25 DIAGNOSIS — C50.912 MALIGNANT NEOPLASM OF LEFT BREAST IN FEMALE, ESTROGEN RECEPTOR POSITIVE, UNSPECIFIED SITE OF BREAST (HCC): ICD-10-CM

## 2019-03-25 DIAGNOSIS — Z17.0 MALIGNANT NEOPLASM OF LEFT BREAST IN FEMALE, ESTROGEN RECEPTOR POSITIVE, UNSPECIFIED SITE OF BREAST (HCC): ICD-10-CM

## 2019-03-25 PROCEDURE — G0279 TOMOSYNTHESIS, MAMMO: HCPCS

## 2019-03-25 PROCEDURE — 77065 DX MAMMO INCL CAD UNI: CPT

## 2019-04-03 ENCOUNTER — OFFICE VISIT (OUTPATIENT)
Dept: SURGICAL ONCOLOGY | Facility: CLINIC | Age: 74
End: 2019-04-03
Payer: MEDICARE

## 2019-04-03 VITALS
SYSTOLIC BLOOD PRESSURE: 130 MMHG | HEART RATE: 92 BPM | WEIGHT: 176 LBS | HEIGHT: 64 IN | TEMPERATURE: 98.5 F | RESPIRATION RATE: 14 BRPM | DIASTOLIC BLOOD PRESSURE: 80 MMHG | BODY MASS INDEX: 30.05 KG/M2

## 2019-04-03 DIAGNOSIS — Z12.39 BREAST CANCER SCREENING, HIGH RISK PATIENT: ICD-10-CM

## 2019-04-03 DIAGNOSIS — C50.812 MALIGNANT NEOPLASM OF OVERLAPPING SITES OF LEFT BREAST IN FEMALE, ESTROGEN RECEPTOR POSITIVE (HCC): Primary | ICD-10-CM

## 2019-04-03 DIAGNOSIS — Z79.811 USE OF ANASTROZOLE (ARIMIDEX): ICD-10-CM

## 2019-04-03 DIAGNOSIS — Z17.0 MALIGNANT NEOPLASM OF OVERLAPPING SITES OF LEFT BREAST IN FEMALE, ESTROGEN RECEPTOR POSITIVE (HCC): Primary | ICD-10-CM

## 2019-04-03 PROCEDURE — 99214 OFFICE O/P EST MOD 30 MIN: CPT | Performed by: SURGERY

## 2019-04-04 DIAGNOSIS — Z17.0 MALIGNANT NEOPLASM OF OVERLAPPING SITES OF LEFT BREAST IN FEMALE, ESTROGEN RECEPTOR POSITIVE (HCC): Primary | ICD-10-CM

## 2019-04-04 DIAGNOSIS — C50.812 MALIGNANT NEOPLASM OF OVERLAPPING SITES OF LEFT BREAST IN FEMALE, ESTROGEN RECEPTOR POSITIVE (HCC): Primary | ICD-10-CM

## 2019-05-07 ENCOUNTER — ANNUAL EXAM (OUTPATIENT)
Dept: GYNECOLOGY | Facility: CLINIC | Age: 74
End: 2019-05-07
Payer: MEDICARE

## 2019-05-07 VITALS
DIASTOLIC BLOOD PRESSURE: 78 MMHG | SYSTOLIC BLOOD PRESSURE: 128 MMHG | BODY MASS INDEX: 29.53 KG/M2 | WEIGHT: 173 LBS | HEIGHT: 64 IN

## 2019-05-07 DIAGNOSIS — Z78.0 MENOPAUSE: ICD-10-CM

## 2019-05-07 DIAGNOSIS — C50.812 MALIGNANT NEOPLASM OF OVERLAPPING SITES OF LEFT BREAST IN FEMALE, ESTROGEN RECEPTOR POSITIVE (HCC): ICD-10-CM

## 2019-05-07 DIAGNOSIS — Z79.811 USE OF ANASTROZOLE (ARIMIDEX): ICD-10-CM

## 2019-05-07 DIAGNOSIS — Z01.419 ENCOUNTER FOR GYNECOLOGICAL EXAMINATION WITH PAPANICOLAOU SMEAR OF CERVIX: ICD-10-CM

## 2019-05-07 DIAGNOSIS — Z13.820 SCREENING FOR OSTEOPOROSIS: Primary | ICD-10-CM

## 2019-05-07 DIAGNOSIS — Z01.419 ENCOUNTER FOR GYNECOLOGICAL EXAMINATION WITHOUT ABNORMAL FINDING: ICD-10-CM

## 2019-05-07 DIAGNOSIS — Z17.0 MALIGNANT NEOPLASM OF OVERLAPPING SITES OF LEFT BREAST IN FEMALE, ESTROGEN RECEPTOR POSITIVE (HCC): ICD-10-CM

## 2019-05-07 PROCEDURE — G0101 CA SCREEN;PELVIC/BREAST EXAM: HCPCS | Performed by: OBSTETRICS & GYNECOLOGY

## 2019-05-07 PROCEDURE — G0145 SCR C/V CYTO,THINLAYER,RESCR: HCPCS | Performed by: OBSTETRICS & GYNECOLOGY

## 2019-05-09 LAB
LAB AP GYN PRIMARY INTERPRETATION: NORMAL
Lab: NORMAL

## 2019-05-13 DIAGNOSIS — Z78.0 MENOPAUSE: ICD-10-CM

## 2019-05-13 DIAGNOSIS — Z13.820 SCREENING FOR OSTEOPOROSIS: ICD-10-CM

## 2019-05-30 ENCOUNTER — LAB (OUTPATIENT)
Dept: LAB | Facility: CLINIC | Age: 74
End: 2019-05-30
Payer: MEDICARE

## 2019-05-30 DIAGNOSIS — E78.2 MIXED HYPERLIPIDEMIA: ICD-10-CM

## 2019-05-30 LAB
ALBUMIN SERPL BCP-MCNC: 3.9 G/DL (ref 3.5–5)
ALP SERPL-CCNC: 66 U/L (ref 46–116)
ALT SERPL W P-5'-P-CCNC: 24 U/L (ref 12–78)
ANION GAP SERPL CALCULATED.3IONS-SCNC: 8 MMOL/L (ref 4–13)
AST SERPL W P-5'-P-CCNC: 19 U/L (ref 5–45)
BILIRUB SERPL-MCNC: 0.56 MG/DL (ref 0.2–1)
BUN SERPL-MCNC: 12 MG/DL (ref 5–25)
CALCIUM SERPL-MCNC: 8.7 MG/DL (ref 8.3–10.1)
CHLORIDE SERPL-SCNC: 107 MMOL/L (ref 100–108)
CHOLEST SERPL-MCNC: 166 MG/DL (ref 50–200)
CO2 SERPL-SCNC: 26 MMOL/L (ref 21–32)
CREAT SERPL-MCNC: 0.6 MG/DL (ref 0.6–1.3)
GFR SERPL CREATININE-BSD FRML MDRD: 91 ML/MIN/1.73SQ M
GLUCOSE P FAST SERPL-MCNC: 95 MG/DL (ref 65–99)
HDLC SERPL-MCNC: 55 MG/DL (ref 40–60)
LDLC SERPL CALC-MCNC: 81 MG/DL (ref 0–100)
NONHDLC SERPL-MCNC: 111 MG/DL
POTASSIUM SERPL-SCNC: 4 MMOL/L (ref 3.5–5.3)
PROT SERPL-MCNC: 7.3 G/DL (ref 6.4–8.2)
SODIUM SERPL-SCNC: 141 MMOL/L (ref 136–145)
TRIGL SERPL-MCNC: 152 MG/DL

## 2019-05-30 PROCEDURE — 80053 COMPREHEN METABOLIC PANEL: CPT

## 2019-05-30 PROCEDURE — 36415 COLL VENOUS BLD VENIPUNCTURE: CPT

## 2019-05-30 PROCEDURE — 80061 LIPID PANEL: CPT

## 2019-06-14 ENCOUNTER — OFFICE VISIT (OUTPATIENT)
Dept: FAMILY MEDICINE CLINIC | Facility: CLINIC | Age: 74
End: 2019-06-14
Payer: MEDICARE

## 2019-06-14 VITALS
HEIGHT: 64 IN | BODY MASS INDEX: 29.78 KG/M2 | DIASTOLIC BLOOD PRESSURE: 92 MMHG | HEART RATE: 72 BPM | SYSTOLIC BLOOD PRESSURE: 138 MMHG | WEIGHT: 174.4 LBS

## 2019-06-14 DIAGNOSIS — Z79.811 USE OF ANASTROZOLE (ARIMIDEX): ICD-10-CM

## 2019-06-14 DIAGNOSIS — Z23 ENCOUNTER FOR IMMUNIZATION: ICD-10-CM

## 2019-06-14 DIAGNOSIS — E78.2 MIXED HYPERLIPIDEMIA: ICD-10-CM

## 2019-06-14 DIAGNOSIS — Z12.11 COLON CANCER SCREENING: Primary | ICD-10-CM

## 2019-06-14 DIAGNOSIS — E03.9 ACQUIRED HYPOTHYROIDISM: ICD-10-CM

## 2019-06-14 DIAGNOSIS — C50.812 MALIGNANT NEOPLASM OF OVERLAPPING SITES OF LEFT BREAST IN FEMALE, ESTROGEN RECEPTOR POSITIVE (HCC): ICD-10-CM

## 2019-06-14 DIAGNOSIS — Z17.0 MALIGNANT NEOPLASM OF OVERLAPPING SITES OF LEFT BREAST IN FEMALE, ESTROGEN RECEPTOR POSITIVE (HCC): ICD-10-CM

## 2019-06-14 DIAGNOSIS — I10 ESSENTIAL HYPERTENSION: ICD-10-CM

## 2019-06-14 DIAGNOSIS — R73.9 HYPERGLYCEMIA: ICD-10-CM

## 2019-06-14 PROBLEM — Z12.39 BREAST CANCER SCREENING, HIGH RISK PATIENT: Status: RESOLVED | Noted: 2019-04-03 | Resolved: 2019-06-14

## 2019-06-14 PROCEDURE — 99214 OFFICE O/P EST MOD 30 MIN: CPT | Performed by: FAMILY MEDICINE

## 2019-06-20 ENCOUNTER — APPOINTMENT (OUTPATIENT)
Dept: LAB | Facility: CLINIC | Age: 74
End: 2019-06-20
Payer: MEDICARE

## 2019-06-20 DIAGNOSIS — Z12.11 COLON CANCER SCREENING: ICD-10-CM

## 2019-06-20 LAB — HEMOCCULT STL QL IA: POSITIVE

## 2019-06-20 PROCEDURE — G0328 FECAL BLOOD SCRN IMMUNOASSAY: HCPCS

## 2019-06-21 DIAGNOSIS — C50.812 MALIGNANT NEOPLASM OF OVERLAPPING SITES OF LEFT BREAST IN FEMALE, ESTROGEN RECEPTOR POSITIVE (HCC): ICD-10-CM

## 2019-06-21 DIAGNOSIS — Z17.0 MALIGNANT NEOPLASM OF OVERLAPPING SITES OF LEFT BREAST IN FEMALE, ESTROGEN RECEPTOR POSITIVE (HCC): ICD-10-CM

## 2019-06-21 DIAGNOSIS — R19.5 OCCULT BLOOD IN STOOLS: Primary | ICD-10-CM

## 2019-06-24 ENCOUNTER — TELEPHONE (OUTPATIENT)
Dept: FAMILY MEDICINE CLINIC | Facility: CLINIC | Age: 74
End: 2019-06-24

## 2019-07-29 PROBLEM — K57.90 DIVERTICULOSIS: Status: ACTIVE | Noted: 2019-07-29

## 2019-09-09 ENCOUNTER — OFFICE VISIT (OUTPATIENT)
Dept: HEMATOLOGY ONCOLOGY | Facility: CLINIC | Age: 74
End: 2019-09-09
Payer: MEDICARE

## 2019-09-09 VITALS
WEIGHT: 170.8 LBS | HEART RATE: 75 BPM | BODY MASS INDEX: 29.16 KG/M2 | TEMPERATURE: 97.7 F | OXYGEN SATURATION: 97 % | RESPIRATION RATE: 18 BRPM | SYSTOLIC BLOOD PRESSURE: 128 MMHG | HEIGHT: 64 IN | DIASTOLIC BLOOD PRESSURE: 86 MMHG

## 2019-09-09 DIAGNOSIS — C50.812 MALIGNANT NEOPLASM OF OVERLAPPING SITES OF LEFT BREAST IN FEMALE, ESTROGEN RECEPTOR POSITIVE (HCC): Primary | ICD-10-CM

## 2019-09-09 DIAGNOSIS — Z17.0 MALIGNANT NEOPLASM OF OVERLAPPING SITES OF LEFT BREAST IN FEMALE, ESTROGEN RECEPTOR POSITIVE (HCC): Primary | ICD-10-CM

## 2019-09-09 PROCEDURE — 99214 OFFICE O/P EST MOD 30 MIN: CPT | Performed by: INTERNAL MEDICINE

## 2019-09-09 NOTE — PROGRESS NOTES
Hematology / Oncology Outpatient Follow Up Note    Reza Leone 69 y o  female Jaimie Corley FYX:[id number]         Date:  9/9/2019    Assessment / Plan:  A 69year old postmenopausal woman with stage IIB left breast cancer, grade 3, ER/LA positive, HER-2 Fish equivocal disease  She underwent mastectomy with axillary lymph node dissection, without reconstruction resulting in the SUNITHA  She has 3 positive lymph nodes  Multiple HER-2 fish test were consistently showed a borderline amplification  She was treated as HER-2 positive disease  She completed adjuvant chemotherapy as well as one year course of adjuvant Herceptin, in June 2015  She is currently on adjuvant hormonal therapy with anastrozole with no side effects  Clinically, she has no evidence recurrent disease  He had discussion regarding 10 years versus 5 years of aromatase inhibitor as adjuvant hormonal therapy  After discussing pros and cons, we decided to extend the treatment beyond 5 years  I will see her again in a year for routine follow-up  She is in agreement with my recommendations            Subjective:      HPI:  A 80year old postmenopausal woman, who underwent regular screening mammography, which showed an abnormality in her left breast  Therefore, she underwent ultrasound-guided biopsy in March 9, 2014, which showed invasive ductal carcinoma  Subsequently, she underwent mastectomy with axillary lymph node dissection in April 4, 2014 which showed a 3 5 cm invasive ductal carcinoma, grade 3  3/11 axillary lymph node were positive for metastatic disease  This was %, LA 10-15% positive, HER-2 2+ disease  HER-2 Fish was equivoal for the gene amplification  I requested pathology Department to set another section for the second HER-2 Fish, which is pending  She presents today to discuss adjuvant treatment options  She is otherwise healthy with past medical history including hypertension, hypothyroidism, and hypercholesterolemia   She has no complaint of pain  Her weight has been stable  She has no respiratory symptoms  She has no family history of breast cancer or ovarian cancer  Her performance status is normal             Interval History:  A 69year old postmenopausal woman, with stage IIB left breast cancer, grade 3, ER positive, HER-2 fish, borderline disease  She had 3 positive lymph nodes, when she underwent mastectomy with lymph node dissection  We have tested her to fish on several occasion, all of which came back as a borderline disease  We decided to treat her as HER-2 positive disease  She completed adjuvant chemotherapy with a c  followed by Taxol and Herceptin  She finished adjuvant Herceptin monotherapy in June 2015 without cardiac toxicity  She is currently on adjuvant hormonal therapy with anastrozole  She came in today for routine follow-up  She continued to do well without any new complaints  She has mild stable hot flashes  She denied musculoskeletal symptoms  Her weight is stable  She has good appetite  She denied any pain  She has no respiratory symptoms  Her performance status is normal        Objective:      Primary Diagnosis:     Left breast cancer  Stage IIB (pT2, N1b, M0) grade 3, ER positive, DE weakly positive, HER-2 fish, borderline disease  Diagnosed in April 2014       Cancer Staging:  Cancer Staging  No matching staging information was found for the patient         Previous Hematologic/ Oncologic Treatment:      1  Adjuvant chemotherapy with dose dense a c  X4 followed by weekly paclitaxel and Herceptin x12  Completed in September 2014  2  postmastectomy radiation therapy completed in December 2014    3  adjuvant Herceptin monotherapy, completed in June 2015       Current Hematologic/ Oncologic Treatment:       1  adjuvant hormonal therapy with anastrozole since October 2014      Disease Status:      SUNITHA status post mastectomy with lymph node dissection       Test Results:     Pathology:     3 5 cm of invasive ductal carcinoma, grade 3  3/11 axillary lymph node positive for metastatic disease  % positive, IN 10-15% positive, HER-2 2+ disease  HER-2 Fish is equivocal  Second, HER-2 Fish was borderline    Stage IIB(pT2, pN1b, M0)       Radiology:     DEXA scan in May 2019 showed T-score-1 4, consistent with osteopenia  Mammography on March 2019  was benign  BI-RADS 2       Laboratory:           Physical Exam:        General Appearance:    Alert, oriented          Eyes:    PERRL   Ears:    Normal external ear canals, both ears   Nose:   Nares normal, septum midline   Throat:   Mucosa moist  Pharynx without injection  Neck:   Supple         Lungs:     Clear to auscultation bilaterally   Chest Wall:    No tenderness or deformity    Heart:    Regular rate and rhythm         Abdomen:     Soft, non-tender, bowel sounds +, no organomegaly               Extremities:   Extremities no cyanosis or edema         Skin:   no rash or icterus  Lymph nodes:   Cervical, supraclavicular, and axillary nodes normal   Neurologic:   CNII-XII intact, normal strength, sensation and reflexes     Throughout             Breast exam:   status post left mastectomy without reconstruction  No palpable abnormality in her left chest wall  Right breast exam is negative  ROS: Review of Systems   Constitutional:        Mild hot flashes   All other systems reviewed and are negative  Imaging: No results found        Labs:   Lab Results   Component Value Date    WBC 6 22 11/23/2018    HGB 14 3 11/23/2018    HCT 44 3 11/23/2018    MCV 92 11/23/2018     11/23/2018     Lab Results   Component Value Date     09/25/2015    K 4 0 05/30/2019     05/30/2019    CO2 26 05/30/2019    ANIONGAP 9 09/25/2015    BUN 12 05/30/2019    CREATININE 0 60 05/30/2019    GLUCOSE 88 09/25/2015    GLUF 95 05/30/2019    CALCIUM 8 7 05/30/2019    AST 19 05/30/2019    ALT 24 05/30/2019    ALKPHOS 66 05/30/2019    PROT 7 1 09/25/2015 BILITOT 0 55 09/25/2015    EGFR 91 05/30/2019         Current Medications: Reviewed  Allergies: Reviewed  PMH/FH/SH:  Reviewed      Vital Sign:    Body surface area is 1 83 meters squared      Wt Readings from Last 3 Encounters:   09/09/19 77 5 kg (170 lb 12 8 oz)   06/14/19 79 1 kg (174 lb 6 4 oz)   05/07/19 78 5 kg (173 lb)        Temp Readings from Last 3 Encounters:   09/09/19 97 7 °F (36 5 °C) (Tympanic Core)   04/03/19 98 5 °F (36 9 °C) (Tympanic)   11/05/18 98 6 °F (37 °C)        BP Readings from Last 3 Encounters:   09/09/19 128/86   06/14/19 138/92   05/07/19 128/78         Pulse Readings from Last 3 Encounters:   09/09/19 75   06/14/19 72   04/03/19 92     @LASTSAO2(3)@

## 2019-09-13 DIAGNOSIS — C50.919 MALIGNANT NEOPLASM OF BREAST IN FEMALE, ESTROGEN RECEPTOR POSITIVE, UNSPECIFIED LATERALITY, UNSPECIFIED SITE OF BREAST (HCC): ICD-10-CM

## 2019-09-13 DIAGNOSIS — Z17.0 MALIGNANT NEOPLASM OF BREAST IN FEMALE, ESTROGEN RECEPTOR POSITIVE, UNSPECIFIED LATERALITY, UNSPECIFIED SITE OF BREAST (HCC): ICD-10-CM

## 2019-09-13 RX ORDER — ANASTROZOLE 1 MG/1
TABLET ORAL
Qty: 90 TABLET | Refills: 1 | Status: SHIPPED | OUTPATIENT
Start: 2019-09-13 | End: 2020-03-09 | Stop reason: SDUPTHER

## 2019-09-21 DIAGNOSIS — I10 ESSENTIAL HYPERTENSION: ICD-10-CM

## 2019-09-21 DIAGNOSIS — E03.9 ACQUIRED HYPOTHYROIDISM: ICD-10-CM

## 2019-09-22 DIAGNOSIS — E78.2 MIXED HYPERLIPIDEMIA: ICD-10-CM

## 2019-09-23 RX ORDER — HYDROCHLOROTHIAZIDE 12.5 MG/1
CAPSULE, GELATIN COATED ORAL
Qty: 90 CAPSULE | Refills: 3 | Status: SHIPPED | OUTPATIENT
Start: 2019-09-23 | End: 2020-09-14

## 2019-09-23 RX ORDER — LEVOTHYROXINE SODIUM 0.03 MG/1
TABLET ORAL
Qty: 90 TABLET | Refills: 3 | Status: SHIPPED | OUTPATIENT
Start: 2019-09-23 | End: 2020-09-14

## 2019-09-23 RX ORDER — PRAVASTATIN SODIUM 40 MG
TABLET ORAL
Qty: 90 TABLET | Refills: 3 | Status: SHIPPED | OUTPATIENT
Start: 2019-09-23 | End: 2020-09-14

## 2019-11-07 ENCOUNTER — CLINICAL SUPPORT (OUTPATIENT)
Dept: FAMILY MEDICINE CLINIC | Facility: CLINIC | Age: 74
End: 2019-11-07
Payer: MEDICARE

## 2019-11-07 DIAGNOSIS — Z23 NEED FOR INFLUENZA VACCINATION: Primary | ICD-10-CM

## 2019-11-07 PROCEDURE — G0008 ADMIN INFLUENZA VIRUS VAC: HCPCS

## 2019-11-07 PROCEDURE — 90662 IIV NO PRSV INCREASED AG IM: CPT

## 2019-12-16 ENCOUNTER — APPOINTMENT (OUTPATIENT)
Dept: LAB | Facility: CLINIC | Age: 74
End: 2019-12-16
Payer: MEDICARE

## 2019-12-16 DIAGNOSIS — E03.9 ACQUIRED HYPOTHYROIDISM: ICD-10-CM

## 2019-12-16 DIAGNOSIS — R73.9 HYPERGLYCEMIA: ICD-10-CM

## 2019-12-16 DIAGNOSIS — I10 ESSENTIAL HYPERTENSION: ICD-10-CM

## 2019-12-16 DIAGNOSIS — E78.2 MIXED HYPERLIPIDEMIA: ICD-10-CM

## 2019-12-16 LAB
ALBUMIN SERPL BCP-MCNC: 3.8 G/DL (ref 3.5–5)
ALP SERPL-CCNC: 67 U/L (ref 46–116)
ALT SERPL W P-5'-P-CCNC: 27 U/L (ref 12–78)
ANION GAP SERPL CALCULATED.3IONS-SCNC: 7 MMOL/L (ref 4–13)
AST SERPL W P-5'-P-CCNC: 20 U/L (ref 5–45)
BILIRUB SERPL-MCNC: 0.61 MG/DL (ref 0.2–1)
BUN SERPL-MCNC: 15 MG/DL (ref 5–25)
CALCIUM SERPL-MCNC: 9.2 MG/DL (ref 8.3–10.1)
CHLORIDE SERPL-SCNC: 105 MMOL/L (ref 100–108)
CHOLEST SERPL-MCNC: 159 MG/DL (ref 50–200)
CO2 SERPL-SCNC: 28 MMOL/L (ref 21–32)
CREAT SERPL-MCNC: 0.55 MG/DL (ref 0.6–1.3)
GFR SERPL CREATININE-BSD FRML MDRD: 93 ML/MIN/1.73SQ M
GLUCOSE P FAST SERPL-MCNC: 96 MG/DL (ref 65–99)
HDLC SERPL-MCNC: 53 MG/DL
LDLC SERPL DIRECT ASSAY-MCNC: 85 MG/DL (ref 0–100)
POTASSIUM SERPL-SCNC: 3.5 MMOL/L (ref 3.5–5.3)
PROT SERPL-MCNC: 7.1 G/DL (ref 6.4–8.2)
SODIUM SERPL-SCNC: 140 MMOL/L (ref 136–145)
TSH SERPL DL<=0.05 MIU/L-ACNC: 6.01 UIU/ML (ref 0.36–3.74)

## 2019-12-16 PROCEDURE — 80053 COMPREHEN METABOLIC PANEL: CPT

## 2019-12-16 PROCEDURE — 83718 ASSAY OF LIPOPROTEIN: CPT

## 2019-12-16 PROCEDURE — 82465 ASSAY BLD/SERUM CHOLESTEROL: CPT

## 2019-12-16 PROCEDURE — 36415 COLL VENOUS BLD VENIPUNCTURE: CPT

## 2019-12-16 PROCEDURE — 83721 ASSAY OF BLOOD LIPOPROTEIN: CPT

## 2019-12-16 PROCEDURE — 84443 ASSAY THYROID STIM HORMONE: CPT

## 2019-12-23 ENCOUNTER — OFFICE VISIT (OUTPATIENT)
Dept: FAMILY MEDICINE CLINIC | Facility: CLINIC | Age: 74
End: 2019-12-23
Payer: MEDICARE

## 2019-12-23 VITALS
WEIGHT: 170 LBS | HEART RATE: 92 BPM | HEIGHT: 64 IN | SYSTOLIC BLOOD PRESSURE: 120 MMHG | TEMPERATURE: 97.2 F | BODY MASS INDEX: 29.02 KG/M2 | DIASTOLIC BLOOD PRESSURE: 81 MMHG

## 2019-12-23 DIAGNOSIS — E03.9 ACQUIRED HYPOTHYROIDISM: ICD-10-CM

## 2019-12-23 DIAGNOSIS — M85.88 OSTEOPENIA OF LUMBAR SPINE: ICD-10-CM

## 2019-12-23 DIAGNOSIS — E78.2 MIXED HYPERLIPIDEMIA: ICD-10-CM

## 2019-12-23 DIAGNOSIS — Z17.0 MALIGNANT NEOPLASM OF OVERLAPPING SITES OF LEFT BREAST IN FEMALE, ESTROGEN RECEPTOR POSITIVE (HCC): ICD-10-CM

## 2019-12-23 DIAGNOSIS — I10 ESSENTIAL HYPERTENSION: Primary | ICD-10-CM

## 2019-12-23 DIAGNOSIS — Z79.811 USE OF ANASTROZOLE (ARIMIDEX): ICD-10-CM

## 2019-12-23 DIAGNOSIS — R73.9 HYPERGLYCEMIA: ICD-10-CM

## 2019-12-23 DIAGNOSIS — C50.812 MALIGNANT NEOPLASM OF OVERLAPPING SITES OF LEFT BREAST IN FEMALE, ESTROGEN RECEPTOR POSITIVE (HCC): ICD-10-CM

## 2019-12-23 PROBLEM — M85.80 OSTEOPENIA: Status: ACTIVE | Noted: 2019-12-23

## 2019-12-23 PROCEDURE — 99214 OFFICE O/P EST MOD 30 MIN: CPT | Performed by: FAMILY MEDICINE

## 2019-12-23 NOTE — ASSESSMENT & PLAN NOTE
Stable at the moment  No changes today  Cholesterol is quite good  Continue on pravastatin 40, check in 6 months

## 2019-12-23 NOTE — PATIENT INSTRUCTIONS
Problem List Items Addressed This Visit     Hyperglycemia     Blood sugar was normal   No changes  Check in 6 months  Relevant Orders    Comprehensive metabolic panel    Hyperlipidemia     Stable at the moment  No changes today  Cholesterol is quite good  Continue on pravastatin 40, check in 6 months  Relevant Orders    Comprehensive metabolic panel    Lipid panel    Hypertension - Primary     Stable for now  Home BP's are good  Continue HCTZ  Relevant Orders    Comprehensive metabolic panel    Hypothyroidism     TSH is minimally elevated, but the patient is not noticing any particular problems or issues  We did review with those might be, and she will be vigilant for them over the next 6 months  Check in 6 months, follow up after that  Relevant Orders    TSH, 3rd generation    Malignant neoplasm of overlapping sites of left breast in female, estrogen receptor positive ([facility] )     Patient is doing quite well at this point  She still following with Hematology/Oncology  Osteopenia     DEXA scan did show osteopenia at the lumbar spine  It did not find osteoporosis  Further, FRAX score does not suggest that treatment would be necessary at this point  Would recommend vitamin-D, calcium, check vitamin-D in 6 months  DEXA scan should be repeated in 2 years  Relevant Orders    Vitamin D 25 hydroxy    Use of anastrozole (Arimidex)     Stable  Follow with Hematology/Oncology

## 2019-12-23 NOTE — ASSESSMENT & PLAN NOTE
DEXA scan did show osteopenia at the lumbar spine  It did not find osteoporosis  Further, FRAX score does not suggest that treatment would be necessary at this point  Would recommend vitamin-D, calcium, check vitamin-D in 6 months  DEXA scan should be repeated in 2 years

## 2019-12-23 NOTE — PROGRESS NOTES
Assessment and Plan:    Problem List Items Addressed This Visit     Hyperglycemia     Blood sugar was normal   No changes  Check in 6 months  Relevant Orders    Comprehensive metabolic panel    Hyperlipidemia     Stable at the moment  No changes today  Cholesterol is quite good  Continue on pravastatin 40, check in 6 months  Relevant Orders    Comprehensive metabolic panel    Lipid panel    Hypertension - Primary     Stable for now  Home BP's are good  Continue HCTZ  Relevant Orders    Comprehensive metabolic panel    Hypothyroidism     TSH is minimally elevated, but the patient is not noticing any particular problems or issues  We did review with those might be, and she will be vigilant for them over the next 6 months  Check in 6 months, follow up after that  Relevant Orders    TSH, 3rd generation    Malignant neoplasm of overlapping sites of left breast in female, estrogen receptor positive ([facility] )     Patient is doing quite well at this point  She still following with Hematology/Oncology  Osteopenia     DEXA scan did show osteopenia at the lumbar spine  It did not find osteoporosis  Further, FRAX score does not suggest that treatment would be necessary at this point  Would recommend vitamin-D, calcium, check vitamin-D in 6 months  DEXA scan should be repeated in 2 years  Relevant Orders    Vitamin D 25 hydroxy    Use of anastrozole (Arimidex)     Stable  Follow with Hematology/Oncology  Diagnoses and all orders for this visit:    Essential hypertension  -     Comprehensive metabolic panel; Future    Acquired hypothyroidism  -     TSH, 3rd generation; Future    Mixed hyperlipidemia  -     Comprehensive metabolic panel; Future  -     Lipid panel; Future    Hyperglycemia  -     Comprehensive metabolic panel;  Future    Malignant neoplasm of overlapping sites of left breast in female, estrogen receptor positive ([facility])    Use of anastrozole (Arimidex)    Osteopenia of lumbar spine  -     Vitamin D 25 hydroxy; Future              Subjective:      Patient ID: Luke Ashford is a 69 y o  female  CC:    Chief Complaint   Patient presents with    Follow-up       HPI:    Patient reports she has a significant amount of stress recently  Her blood pressure today was quite a bit elevated  She reports a list of blood pressures with her today that range from 91 systolic up to a maximum of 123  Diastolics range from 60 up to 81  These are in the last month  Hypothyroid: On Synthroid  Had TSH done  She has had some fatigue at times  Her legs get achey at times  Breast cancer:  She is now more than 5 years out from the initial diagnosis, and treatment  Following with Hematology/Oncology  Currently on Arimidex  Has not had any problems  Cholesterol:  Currently on pravastatin 40 mg  Hypertension:  Currently using hydrochlorothiazide  Patient did have a DEXA scan recently at [address] [place] [address]  This was reviewed with her  The following portions of the patient's history were reviewed and updated as appropriate: allergies, current medications, past family history, past medical history, past social history, past surgical history and problem list       Review of Systems   Constitutional: Negative  HENT: Negative  Eyes: Negative  Respiratory: Negative  Cardiovascular: Negative  Gastrointestinal: Negative  Endocrine: Negative  Genitourinary: Negative  Musculoskeletal: Negative  Skin: Negative  Allergic/Immunologic: Negative  Neurological: Negative  Hematological: Negative  Psychiatric/Behavioral: Negative  Data to review:   Sodium 140, potassium 3 5, calcium 9 2  Blood sugar 96  Creatinine 0 55, GFR 93  AST 20, ALT 27  Total cholesterol 159  LDL 85  HDL 53  This test  TSH 6 010  DEXA scan reviewed  T-score was -1 4 for lumbar spine  Hip T-score was-0 4, femoral neck was-0 6    FRAX score for major fracture was 8 5, FRAX score for hip fracture was 0 9  Objective:    Vitals:    12/23/19 0747 12/23/19 0844 12/23/19 0845   BP: 150/90 160/90 120/81   BP Location: Right arm     Patient Position: Sitting     Cuff Size: Adult     Pulse: 92     Temp: (!) 97 2 °F (36 2 °C)     TempSrc: Tympanic     Weight: 77 1 kg (170 lb)     Height: 5' 3 8" (1 621 m)          Physical Exam   Constitutional: She appears well-developed and well-nourished  HENT:   Head: Normocephalic and atraumatic  Cardiovascular: Normal rate, regular rhythm and normal heart sounds  Pulses:       Carotid pulses are 2+ on the right side, and 2+ on the left side  Pulmonary/Chest: Effort normal and breath sounds normal  She has no wheezes  She has no rales  She exhibits no tenderness  Nursing note and vitals reviewed  BMI Counseling: Body mass index is 29 36 kg/m²  The BMI is above normal  Nutrition recommendations include decreasing portion sizes, encouraging healthy choices of fruits and vegetables, decreasing fast food intake, consuming healthier snacks, limiting drinks that contain sugar, moderation in carbohydrate intake, increasing intake of lean protein, reducing intake of saturated and trans fat and reducing intake of cholesterol  Exercise recommendations include exercising 3-5 times per week  No pharmacotherapy was ordered  BMI Counseling: Body mass index is 29 36 kg/m²  The BMI is above normal  Nutrition recommendations include reducing portion sizes, decreasing overall calorie intake, 3-5 servings of fruits/vegetables daily, reducing fast food intake, consuming healthier snacks, decreasing soda and/or juice intake, moderation in carbohydrate intake, increasing intake of lean protein, reducing intake of saturated fat and trans fat and reducing intake of cholesterol  Exercise recommendations include exercising 3-5 times per week

## 2019-12-23 NOTE — ASSESSMENT & PLAN NOTE
TSH is minimally elevated, but the patient is not noticing any particular problems or issues  We did review with those might be, and she will be vigilant for them over the next 6 months  Check in 6 months, follow up after that

## 2020-03-09 DIAGNOSIS — C50.919 MALIGNANT NEOPLASM OF BREAST IN FEMALE, ESTROGEN RECEPTOR POSITIVE, UNSPECIFIED LATERALITY, UNSPECIFIED SITE OF BREAST (HCC): ICD-10-CM

## 2020-03-09 DIAGNOSIS — Z17.0 MALIGNANT NEOPLASM OF BREAST IN FEMALE, ESTROGEN RECEPTOR POSITIVE, UNSPECIFIED LATERALITY, UNSPECIFIED SITE OF BREAST (HCC): ICD-10-CM

## 2020-03-09 RX ORDER — ANASTROZOLE 1 MG/1
1 TABLET ORAL DAILY
Qty: 90 TABLET | Refills: 1 | Status: SHIPPED | OUTPATIENT
Start: 2020-03-09 | End: 2020-08-31

## 2020-03-26 ENCOUNTER — TELEMEDICINE (OUTPATIENT)
Dept: FAMILY MEDICINE CLINIC | Facility: CLINIC | Age: 75
End: 2020-03-26
Payer: MEDICARE

## 2020-03-26 VITALS — SYSTOLIC BLOOD PRESSURE: 145 MMHG | DIASTOLIC BLOOD PRESSURE: 74 MMHG

## 2020-03-26 DIAGNOSIS — R04.0 EPISTAXIS: Primary | ICD-10-CM

## 2020-03-26 DIAGNOSIS — Z79.82 ASPIRIN LONG-TERM USE: ICD-10-CM

## 2020-03-26 DIAGNOSIS — I10 ESSENTIAL HYPERTENSION: ICD-10-CM

## 2020-03-26 DIAGNOSIS — E66.3 OVERWEIGHT (BMI 25.0-29.9): ICD-10-CM

## 2020-03-26 PROCEDURE — G2012 BRIEF CHECK IN BY MD/QHP: HCPCS | Performed by: FAMILY MEDICINE

## 2020-03-26 NOTE — PROGRESS NOTES
Virtual Regular Visit    Problem List Items Addressed This Visit        Cardiovascular and Mediastinum    Hypertension     Patient will monitor blood pressure at home  Patient is continue her HCTZ 12 5 mg daily            Other    Overweight (BMI 25 0-29  9)     BMI 29 36, weight loss recommended         Aspirin long-term use     I recommend holding aspirin 81 mg for 1 week           Other Visit Diagnoses     Epistaxis    -  Primary          BMI Counseling: There is no height or weight on file to calculate BMI  The BMI is above normal  Nutrition recommendations include decreasing portion sizes  Reason for visit is   Nose bleed  Patient stated approximately 2 hours ago she had a nose bleed off and on  Patient denies any other symptoms  No fever chills cough or wheeze headache vertigo  Encounter provider Arron Leos DO    Provider located at [address] [place] [address] PRIMARY CARE  Liv FISHER [id number]      Recent Visits  No visits were found meeting these conditions  Showing recent visits within past 7 days and meeting all other requirements     Today's Visits  Date Type Provider Dept   03/26/20 Telemedicine Arron Leos DO [facility] Primary Care   Showing today's visits and meeting all other requirements     Future Appointments  Date Type Provider Dept   03/26/20 Telemedicine Arron Leos DO [facility] Primary Care   Showing future appointments within next 150 days and meeting all other requirements        After connecting through Notes[employer], the patient was identified by name and date of birth  Joycelyn eDsir was informed that this is a telemedicine visit and that the visit is being conducted through telephone and patient was informed that this is not a secure, HIPAA-complaint platform  she agrees to proceed  which may not be secure and therefore, might not be HIPAA-compliant  My office door was closed  No one else was in the room    She acknowledged consent and understanding of privacy and security of the video platform  The patient has agreed to participate and understands they can discontinue the visit at any time  Subjective  Ed Ted is a 69 y o  female see  review of systems  Past Medical History:   Diagnosis Date    Abnormal findings on diagnostic imaging of breast     last assessed 3/19/14    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 ([facility] [address] )     last assessed 10/2/15    Disease of thyroid gland     Fibroadenoma of right breast     Hx of radiation therapy     last assessed 17    Hypertension     Hypokalemia     last assessed 4/15/16    Long term use of drug     herceptin,last assessed 17       Past Surgical History:   Procedure Laterality Date    BREAST BIOPSY Left 2014    IDC    BREAST BIOPSY Right 2015    FIBROADENOMA     SECTION      X2    IVC FILTER RETRIEVAL  2014    central iv line with subcutaneous reservoir mediaport    LYMPHADENECTOMY Left 2014    axxillary    MASTECTOMY Left 2014    PORTACATH PLACEMENT  2014    SENTINEL LYMPH NODE BIOPSY Left 2014    AND    TONSILLECTOMY  195    TUBAL LIGATION         Current Outpatient Medications   Medication Sig Dispense Refill    anastrozole (ARIMIDEX) 1 mg tablet Take 1 tablet (1 mg total) by mouth daily 90 tablet 1    aspirin (ECOTRIN LOW STRENGTH) 81 mg EC tablet Take 81 mg by mouth daily      Calcium Carbonate-Vitamin D (CALCIUM 600+D) 600-400 MG-UNIT per tablet Take 2 tablets by mouth daily      hydrochlorothiazide (MICROZIDE) 12 5 mg capsule TAKE 1 CAPSULE BY MOUTH EVERY DAY 90 capsule 3    levothyroxine 25 mcg tablet TAKE 1 TABLET BY MOUTH EVERY DAY 90 tablet 3    Multiple Vitamin (MULTIVITAMINS PO) Take by mouth      pravastatin (PRAVACHOL) 40 mg tablet TAKE 1 TABLET BY MOUTH EVERY DAY 90 tablet 3     No current facility-administered medications for this visit  No Known Allergies    Review of Systems   Constitutional: Negative  HENT:        HPI   Eyes: Negative  Respiratory: Negative  Cardiovascular: Negative  Gastrointestinal: Negative  Endocrine: Negative  Genitourinary: Negative  Musculoskeletal: Negative  Skin: Negative  Allergic/Immunologic: Negative  Neurological: Negative  Hematological:        HPI   Psychiatric/Behavioral: Negative  I spent 10   minutes with the patient during this visit  Assessment/plan:  1  Epistaxis  Patient was instructed on the 1st aid to bend the head forward tinged the nose for 10 full minutes  Patient to used humidifier or vaporizer in bedroom  Patient may use Vaseline to moisturize the nasal passages  2  The aspirin use, recommend holding aspirin 81 mg the next 7 days  3  Hypertension, patient will monitor at home patient to continue HCTZ 12 5 mg daily  4  BMI 29 36 weight loss recommend  5  Patient to return to office in 1 week if still symptoms  If above does not work patient report to [facility]  6   If patient were in office ordered all the level [id number]

## 2020-06-04 ENCOUNTER — HOSPITAL ENCOUNTER (OUTPATIENT)
Dept: MAMMOGRAPHY | Facility: CLINIC | Age: 75
Discharge: HOME | End: 2020-06-04
Payer: MEDICARE

## 2020-06-04 VITALS — BODY MASS INDEX: 25.76 KG/M2 | HEIGHT: 68 IN | WEIGHT: 170 LBS

## 2020-06-04 DIAGNOSIS — Z12.31 SCREENING MAMMOGRAM FOR HIGH-RISK PATIENT: ICD-10-CM

## 2020-06-04 PROCEDURE — 77063 BREAST TOMOSYNTHESIS BI: CPT

## 2020-06-04 PROCEDURE — 77067 SCR MAMMO BI INCL CAD: CPT

## 2020-06-23 ENCOUNTER — APPOINTMENT (OUTPATIENT)
Dept: LAB | Facility: CLINIC | Age: 75
End: 2020-06-23
Payer: MEDICARE

## 2020-06-23 DIAGNOSIS — M85.88 OSTEOPENIA OF LUMBAR SPINE: ICD-10-CM

## 2020-06-23 DIAGNOSIS — E78.2 MIXED HYPERLIPIDEMIA: ICD-10-CM

## 2020-06-23 DIAGNOSIS — R73.9 HYPERGLYCEMIA: ICD-10-CM

## 2020-06-23 DIAGNOSIS — I10 ESSENTIAL HYPERTENSION: ICD-10-CM

## 2020-06-23 DIAGNOSIS — E03.9 ACQUIRED HYPOTHYROIDISM: ICD-10-CM

## 2020-06-23 LAB
25(OH)D3 SERPL-MCNC: 49 NG/ML (ref 30–100)
ALBUMIN SERPL BCP-MCNC: 3.7 G/DL (ref 3.5–5)
ALP SERPL-CCNC: 52 U/L (ref 46–116)
ALT SERPL W P-5'-P-CCNC: 29 U/L (ref 12–78)
ANION GAP SERPL CALCULATED.3IONS-SCNC: 8 MMOL/L (ref 4–13)
AST SERPL W P-5'-P-CCNC: 21 U/L (ref 5–45)
BILIRUB SERPL-MCNC: 0.65 MG/DL (ref 0.2–1)
BUN SERPL-MCNC: 13 MG/DL (ref 5–25)
CALCIUM SERPL-MCNC: 8.9 MG/DL (ref 8.3–10.1)
CHLORIDE SERPL-SCNC: 104 MMOL/L (ref 100–108)
CHOLEST SERPL-MCNC: 150 MG/DL (ref 50–200)
CO2 SERPL-SCNC: 25 MMOL/L (ref 21–32)
CREAT SERPL-MCNC: 0.54 MG/DL (ref 0.6–1.3)
GFR SERPL CREATININE-BSD FRML MDRD: 93 ML/MIN/1.73SQ M
GLUCOSE P FAST SERPL-MCNC: 80 MG/DL (ref 65–99)
HDLC SERPL-MCNC: 48 MG/DL
LDLC SERPL CALC-MCNC: 78 MG/DL (ref 0–100)
NONHDLC SERPL-MCNC: 102 MG/DL
POTASSIUM SERPL-SCNC: 3.6 MMOL/L (ref 3.5–5.3)
PROT SERPL-MCNC: 7.1 G/DL (ref 6.4–8.2)
SODIUM SERPL-SCNC: 137 MMOL/L (ref 136–145)
TRIGL SERPL-MCNC: 122 MG/DL
TSH SERPL DL<=0.05 MIU/L-ACNC: 3.56 UIU/ML (ref 0.36–3.74)

## 2020-06-23 PROCEDURE — 82306 VITAMIN D 25 HYDROXY: CPT

## 2020-06-23 PROCEDURE — 84443 ASSAY THYROID STIM HORMONE: CPT

## 2020-06-23 PROCEDURE — 80061 LIPID PANEL: CPT

## 2020-06-23 PROCEDURE — 80053 COMPREHEN METABOLIC PANEL: CPT

## 2020-06-23 PROCEDURE — 36415 COLL VENOUS BLD VENIPUNCTURE: CPT

## 2020-06-30 ENCOUNTER — OFFICE VISIT (OUTPATIENT)
Dept: FAMILY MEDICINE CLINIC | Facility: CLINIC | Age: 75
End: 2020-06-30
Payer: MEDICARE

## 2020-06-30 VITALS
TEMPERATURE: 97.8 F | SYSTOLIC BLOOD PRESSURE: 130 MMHG | HEART RATE: 84 BPM | WEIGHT: 162 LBS | BODY MASS INDEX: 24.55 KG/M2 | HEIGHT: 68 IN | DIASTOLIC BLOOD PRESSURE: 92 MMHG

## 2020-06-30 DIAGNOSIS — Z17.0 MALIGNANT NEOPLASM OF OVERLAPPING SITES OF LEFT BREAST IN FEMALE, ESTROGEN RECEPTOR POSITIVE (HCC): ICD-10-CM

## 2020-06-30 DIAGNOSIS — Z79.811 USE OF ANASTROZOLE (ARIMIDEX): ICD-10-CM

## 2020-06-30 DIAGNOSIS — C50.812 MALIGNANT NEOPLASM OF OVERLAPPING SITES OF LEFT BREAST IN FEMALE, ESTROGEN RECEPTOR POSITIVE (HCC): ICD-10-CM

## 2020-06-30 DIAGNOSIS — I10 ESSENTIAL HYPERTENSION: Primary | ICD-10-CM

## 2020-06-30 DIAGNOSIS — E66.3 OVERWEIGHT: ICD-10-CM

## 2020-06-30 DIAGNOSIS — E78.2 MIXED HYPERLIPIDEMIA: ICD-10-CM

## 2020-06-30 DIAGNOSIS — E03.9 ACQUIRED HYPOTHYROIDISM: ICD-10-CM

## 2020-06-30 PROCEDURE — 1036F TOBACCO NON-USER: CPT | Performed by: FAMILY MEDICINE

## 2020-06-30 PROCEDURE — 3080F DIAST BP >= 90 MM HG: CPT | Performed by: FAMILY MEDICINE

## 2020-06-30 PROCEDURE — 1123F ACP DISCUSS/DSCN MKR DOCD: CPT | Performed by: FAMILY MEDICINE

## 2020-06-30 PROCEDURE — 99214 OFFICE O/P EST MOD 30 MIN: CPT | Performed by: FAMILY MEDICINE

## 2020-06-30 PROCEDURE — G0438 PPPS, INITIAL VISIT: HCPCS | Performed by: FAMILY MEDICINE

## 2020-06-30 PROCEDURE — 4040F PNEUMOC VAC/ADMIN/RCVD: CPT | Performed by: FAMILY MEDICINE

## 2020-06-30 PROCEDURE — 1160F RVW MEDS BY RX/DR IN RCRD: CPT | Performed by: FAMILY MEDICINE

## 2020-06-30 PROCEDURE — 1125F AMNT PAIN NOTED PAIN PRSNT: CPT | Performed by: FAMILY MEDICINE

## 2020-06-30 PROCEDURE — 3075F SYST BP GE 130 - 139MM HG: CPT | Performed by: FAMILY MEDICINE

## 2020-06-30 PROCEDURE — 1170F FXNL STATUS ASSESSED: CPT | Performed by: FAMILY MEDICINE

## 2020-06-30 PROCEDURE — 3008F BODY MASS INDEX DOCD: CPT | Performed by: FAMILY MEDICINE

## 2020-07-23 ENCOUNTER — TELEPHONE (OUTPATIENT)
Dept: SURGICAL ONCOLOGY | Facility: CLINIC | Age: 75
End: 2020-07-23

## 2020-07-23 NOTE — TELEPHONE ENCOUNTER
Called patient and she sates she has not been out of [facility]  in the past 2 weeks  She does not have any symptoms of fever, cough, or shortness of breath, diarrhea, chills, shaking with chills, muscle pain, headache, sore throat, or new loss of taste/smell  Patient has not been tested for Covid-19  Patient has not been in contact with someone that has been confirmed having Covid-19  Patient was reminded to wear required face mask when entering the facility  Patient was informed only 1 visitor allowed at time of appointment who will also be asked the same screening questions and to wear a mask without a vent or filter  Patient aware to contact office prior to coming in if she develops any of the above symptoms or comes in contact with a person know to have the COVID-19

## 2020-07-28 ENCOUNTER — OFFICE VISIT (OUTPATIENT)
Dept: SURGICAL ONCOLOGY | Facility: CLINIC | Age: 75
End: 2020-07-28
Payer: MEDICARE

## 2020-07-28 VITALS
SYSTOLIC BLOOD PRESSURE: 120 MMHG | HEART RATE: 91 BPM | WEIGHT: 161.4 LBS | HEIGHT: 68 IN | RESPIRATION RATE: 18 BRPM | DIASTOLIC BLOOD PRESSURE: 80 MMHG | BODY MASS INDEX: 24.46 KG/M2 | TEMPERATURE: 97.3 F

## 2020-07-28 DIAGNOSIS — C50.812 MALIGNANT NEOPLASM OF OVERLAPPING SITES OF LEFT BREAST IN FEMALE, ESTROGEN RECEPTOR POSITIVE (HCC): Primary | ICD-10-CM

## 2020-07-28 DIAGNOSIS — Z79.811 USE OF ANASTROZOLE (ARIMIDEX): ICD-10-CM

## 2020-07-28 DIAGNOSIS — Z12.39 BREAST CANCER SCREENING, HIGH RISK PATIENT: ICD-10-CM

## 2020-07-28 DIAGNOSIS — Z17.0 MALIGNANT NEOPLASM OF OVERLAPPING SITES OF LEFT BREAST IN FEMALE, ESTROGEN RECEPTOR POSITIVE (HCC): Primary | ICD-10-CM

## 2020-07-28 PROCEDURE — 3074F SYST BP LT 130 MM HG: CPT | Performed by: SURGERY

## 2020-07-28 PROCEDURE — 3079F DIAST BP 80-89 MM HG: CPT | Performed by: SURGERY

## 2020-07-28 PROCEDURE — 4040F PNEUMOC VAC/ADMIN/RCVD: CPT | Performed by: SURGERY

## 2020-07-28 PROCEDURE — 1170F FXNL STATUS ASSESSED: CPT | Performed by: SURGERY

## 2020-07-28 PROCEDURE — 1036F TOBACCO NON-USER: CPT | Performed by: SURGERY

## 2020-07-28 PROCEDURE — 3008F BODY MASS INDEX DOCD: CPT | Performed by: SURGERY

## 2020-07-28 PROCEDURE — 1160F RVW MEDS BY RX/DR IN RCRD: CPT | Performed by: SURGERY

## 2020-07-28 PROCEDURE — 99214 OFFICE O/P EST MOD 30 MIN: CPT | Performed by: SURGERY

## 2020-07-28 NOTE — PROGRESS NOTES
Surgical Oncology Follow Up       [facility] SURGICAL ONCOLOGY [facility] [place] [address]    Renny Elizabeth  1945  [id number]  [facility] ONCOL[facility] [address]    Chief Complaint   Patient presents with    Follow-up       Assessment/Plan   Diagnoses and all orders for this visit:    Malignant neoplasm of overlapping sites of left breast in female, estrogen receptor positive ([facility] [address] )    Use of anastrozole (Arimidex)    Breast cancer screening, high risk patient  -     Mammo screening right w 3d & cad; Future        Advance Care Planning/Advance Directives:  Discussed disease status, cancer treatment plans and/or cancer treatment goals with the patient  Oncology History:    Oncology History             Malignant neoplasm of overlapping sites of left breast in female, estrogen receptor positive ([facility] [address] )     Initial Diagnosis     Malignant neoplasm of left breast in female, estrogen receptor positive ([facility] [address] )      3/19/2014 Surgery     Left breast US guided core biopsy,  IDC       4/14/2014 Surgery     Left breast mastectomy, SLNB, AND      10/29/2014 - 12/8/2014 Radiation     Mastectomy scar/left chest wall and the left supraclavicular fossa: 50 4Gy in 28 daily 180cGy fractions  Dr Esthela Galicia  2014 -  Chemotherapy     05/2014  Dose dense AC X 4 cycles  07/2014  Paclitaxel X 12  Herceptin  Dr Paco Noriega  Hormone Therapy     Anastrazole  Dr Paco Noriega         History of Present Illness: breast cancer follow up, no concerns, continues on anastrazole  -Interval History: recent contralateral mammogram    Review of Systems:  Review of Systems   Constitutional: Negative  Negative for appetite change and fever  Eyes: Negative  Respiratory: Negative for shortness of breath  Cardiovascular: Negative  Gastrointestinal: Negative  Endocrine: Negative      Genitourinary: Negative  Musculoskeletal: Negative  Negative for arthralgias and myalgias  Skin: Negative  Allergic/Immunologic: Negative  Neurological: Negative  Hematological: Negative  Negative for adenopathy  Does not bruise/bleed easily  Psychiatric/Behavioral: Negative          Patient Active Problem List   Diagnosis    Malignant neoplasm of overlapping sites of left breast in female, estrogen receptor positive ([facility] )    Use of anastrozole (Arimidex)    Hyperglycemia    Hyperlipidemia    Hypertension    Hypothyroidism    Menopause    Chronic pain of both knees    Myalgia    Breast cancer screening, high risk patient    Occult blood in stools    Diverticulosis    Osteopenia    Aspirin long-term use     Past Medical History:   Diagnosis Date    Abnormal findings on diagnostic imaging of breast     last assessed 3/19/14    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 ([facility] )     last assessed 10/2/15    Disease of thyroid gland     Fibroadenoma of right breast     Hx of radiation therapy     last assessed 17    Hypertension     Hypokalemia     last assessed 4/15/16    Long term use of drug     herceptin,last assessed 17    Overweight 3/26/2020     Past Surgical History:   Procedure Laterality Date    BREAST BIOPSY Left 2014    IDC    BREAST BIOPSY Right 2015    FIBROADENOMA     SECTION      X2    IVC FILTER RETRIEVAL  2014    central iv line with subcutaneous reservoir mediaport    LYMPHADENECTOMY Left 2014    axxillary    MASTECTOMY Left 2014    PORTACATH PLACEMENT  2014    SENTINEL LYMPH NODE BIOPSY Left 2014    AND    TONSILLECTOMY  195    TUBAL LIGATION       Family History   Problem Relation Age of Onset    Heart failure Mother     Coronary artery disease Mother     Diabetes Mother     Breast cancer additional onset Sister      Social History     Socioeconomic History    Marital status: /Civil Union     Spouse name: Not on file    Number of children: Not on file    Years of education: Not on file    Highest education level: Not on file   Occupational History    Occupation: retired   Social Needs    Financial resource strain: Not on file    Food insecurity:     Worry: Not on file     Inability: Not on file   [employer] needs:     Medical: Not on file     Non-medical: Not on file   Tobacco Use    Smoking status: Never Smoker    Smokeless tobacco: Never Used    Tobacco comment: no secondhand smoe exposure   Substance and Sexual Activity    Alcohol use:  Yes     Alcohol/week: 2 0 - 3 0 standard drinks     Types: 2 - 3 Glasses of wine per week     Comment: social    Drug use: No    Sexual activity: Not on file   Lifestyle    Physical activity:     Days per week: Not on file     Minutes per session: Not on file    Stress: Not on file   Relationships    Social connections:     Talks on phone: Not on file     Gets together: Not on file     Attends Bahai service: Not on file     Active member of club or organization: Not on file     Attends meetings of clubs or organizations: Not on file     Relationship status: Not on file    Intimate partner violence:     Fear of current or ex partner: Not on file     Emotionally abused: Not on file     Physically abused: Not on file     Forced sexual activity: Not on file   Other Topics Concern    Not on file   Social History Narrative    Exercises daily       Current Outpatient Medications:     anastrozole (ARIMIDEX) 1 mg tablet, Take 1 tablet (1 mg total) by mouth daily, Disp: 90 tablet, Rfl: 1    aspirin (ECOTRIN LOW STRENGTH) 81 mg EC tablet, Take 81 mg by mouth daily, Disp: , Rfl:     Calcium Carbonate-Vitamin D (CALCIUM 600+D) 600-400 MG-UNIT per tablet, Take 2 tablets by mouth daily, Disp: , Rfl:     hydrochlorothiazide (MICROZIDE) 12 5 mg capsule, TAKE 1 CAPSULE BY MOUTH EVERY DAY, Disp: 90 capsule, Rfl: 3   levothyroxine 25 mcg tablet, TAKE 1 TABLET BY MOUTH EVERY DAY, Disp: 90 tablet, Rfl: 3    Multiple Vitamin (MULTIVITAMINS PO), Take by mouth, Disp: , Rfl:     pravastatin (PRAVACHOL) 40 mg tablet, TAKE 1 TABLET BY MOUTH EVERY DAY, Disp: 90 tablet, Rfl: 3  No Known Allergies    The following portions of the patient's history were reviewed and updated as appropriate: allergies, current medications, past family history, past medical history, past social history, past surgical history and problem list         Vitals:    07/28/20 1104   BP: 120/80   Pulse: 91   Resp: 18   Temp: (!) 97 3 °F (36 3 °C)       Physical Exam   Constitutional: She is oriented to person, place, and time  She appears well-developed and well-nourished  HENT:   Head: Normocephalic and atraumatic  Cardiovascular: Normal heart sounds  Pulmonary/Chest: Breath sounds normal  Right breast exhibits no inverted nipple, no mass, no nipple discharge, no skin change and no tenderness  Left breast exhibits skin change (mastectomy scar)  Left breast exhibits no mass and no tenderness  Abdominal: Soft  Lymphadenopathy:        Right axillary: No pectoral and no lateral adenopathy present  Left axillary: No pectoral and no lateral adenopathy present  Right: No supraclavicular adenopathy present  Left: No supraclavicular adenopathy present  Neurological: She is alert and oriented to person, place, and time  Psychiatric: She has a normal mood and affect  Results:  Labs:      Imaging  6/4/20 right 3d screening mammogram     I reviewed the above imaging data  Discussion/Summary:74 yo female s/p left mastectomy for a stage IIIB IDC  She continues on anastrazole  There is no evidence of disease based on exam today or recent contralateral mammogram  I will therefore see her again in one year or sooner should the need arise

## 2020-08-30 DIAGNOSIS — Z17.0 MALIGNANT NEOPLASM OF BREAST IN FEMALE, ESTROGEN RECEPTOR POSITIVE, UNSPECIFIED LATERALITY, UNSPECIFIED SITE OF BREAST (HCC): ICD-10-CM

## 2020-08-30 DIAGNOSIS — C50.919 MALIGNANT NEOPLASM OF BREAST IN FEMALE, ESTROGEN RECEPTOR POSITIVE, UNSPECIFIED LATERALITY, UNSPECIFIED SITE OF BREAST (HCC): ICD-10-CM

## 2020-08-31 RX ORDER — ANASTROZOLE 1 MG/1
TABLET ORAL
Qty: 90 TABLET | Refills: 1 | Status: SHIPPED | OUTPATIENT
Start: 2020-08-31 | End: 2021-02-26

## 2020-09-12 DIAGNOSIS — E78.2 MIXED HYPERLIPIDEMIA: ICD-10-CM

## 2020-09-12 DIAGNOSIS — E03.9 ACQUIRED HYPOTHYROIDISM: ICD-10-CM

## 2020-09-12 DIAGNOSIS — I10 ESSENTIAL HYPERTENSION: ICD-10-CM

## 2020-09-14 ENCOUNTER — OFFICE VISIT (OUTPATIENT)
Dept: HEMATOLOGY ONCOLOGY | Facility: CLINIC | Age: 75
End: 2020-09-14
Payer: MEDICARE

## 2020-09-14 VITALS
RESPIRATION RATE: 18 BRPM | SYSTOLIC BLOOD PRESSURE: 126 MMHG | WEIGHT: 159 LBS | DIASTOLIC BLOOD PRESSURE: 76 MMHG | BODY MASS INDEX: 27.14 KG/M2 | HEART RATE: 75 BPM | TEMPERATURE: 97.5 F | HEIGHT: 64 IN | OXYGEN SATURATION: 97 %

## 2020-09-14 DIAGNOSIS — Z17.0 MALIGNANT NEOPLASM OF OVERLAPPING SITES OF LEFT BREAST IN FEMALE, ESTROGEN RECEPTOR POSITIVE (HCC): Primary | ICD-10-CM

## 2020-09-14 DIAGNOSIS — C50.812 MALIGNANT NEOPLASM OF OVERLAPPING SITES OF LEFT BREAST IN FEMALE, ESTROGEN RECEPTOR POSITIVE (HCC): Primary | ICD-10-CM

## 2020-09-14 PROCEDURE — 99214 OFFICE O/P EST MOD 30 MIN: CPT | Performed by: INTERNAL MEDICINE

## 2020-09-14 RX ORDER — PRAVASTATIN SODIUM 40 MG
TABLET ORAL
Qty: 90 TABLET | Refills: 3 | Status: SHIPPED | OUTPATIENT
Start: 2020-09-14 | End: 2021-08-08

## 2020-09-14 RX ORDER — HYDROCHLOROTHIAZIDE 12.5 MG/1
CAPSULE, GELATIN COATED ORAL
Qty: 90 CAPSULE | Refills: 3 | Status: SHIPPED | OUTPATIENT
Start: 2020-09-14 | End: 2021-08-08

## 2020-09-14 RX ORDER — LEVOTHYROXINE SODIUM 0.03 MG/1
TABLET ORAL
Qty: 90 TABLET | Refills: 3 | Status: SHIPPED | OUTPATIENT
Start: 2020-09-14 | End: 2021-08-08

## 2020-09-14 NOTE — PROGRESS NOTES
Hematology / Oncology Outpatient Follow Up Note    Berle Necessary 77 y o  female Diane Espinoza [facility]:[id number]         Date:  9/14/2020    Assessment / Plan:  A 77 year old postmenopausal woman with stage IIB left breast cancer, grade 3, ER/SC positive, HER-2 Fish equivocal disease  She underwent mastectomy with axillary lymph node dissection, without reconstruction resulting in the SUNITHA  She has 3 positive lymph nodes  Multiple HER-2 fish test were consistently showed a borderline amplification  She was treated as HER-2 positive disease  She completed adjuvant chemotherapy as well as one year course of adjuvant Herceptin, in June 2015  She is currently on adjuvant hormonal therapy with anastrozole with no side effects  She has no evidence recurrent disease, based on her symptomatology and physical examination  I recommended her to continue with anastrozole 1 mg once a day  She is in agreement with my recommendation  I will see her again in a year for routine follow-up           Subjective:      HPI:  A 80year old postmenopausal woman, who underwent regular screening mammography, which showed an abnormality in her left breast  Therefore, she underwent ultrasound-guided biopsy in March 9, 2014, which showed invasive ductal carcinoma  Subsequently, she underwent mastectomy with axillary lymph node dissection in April 4, 2014 which showed a 3 5 cm invasive ductal carcinoma, grade 3  3/11 axillary lymph node were positive for metastatic disease  This was %, SC 10-15% positive, HER-2 2+ disease  HER-2 Fish was equivoal for the gene amplification  I requested pathology Department to set another section for the second HER-2 Fish, which is pending  She presents today to discuss adjuvant treatment options  She is otherwise healthy with past medical history including hypertension, hypothyroidism, and hypercholesterolemia  She has no complaint of pain  Her weight has been stable  She has no respiratory symptoms  She has no family history of breast cancer or ovarian cancer  Her performance status is normal             Interval History:  A 77 year old postmenopausal woman, with stage IIB left breast cancer, grade 3, ER positive, HER-2 fish, borderline disease  She had 3 positive lymph nodes, when she underwent mastectomy with lymph node dissection  We have tested her to fish on several occasion, all of which came back as a borderline disease  We decided to treat her as HER-2 positive disease  She completed adjuvant chemotherapy with a c  followed by Taxol and Herceptin  She finished adjuvant Herceptin monotherapy in June 2015 without cardiac toxicity  She is currently on adjuvant hormonal therapy with anastrozole  She came in today for routine follow-up  Last year, we decided to extend hormonal therapy beyond 5 years  She is tolerating anastrozole very well with very occasional hot flashes without musculoskeletal symptoms  She denied any pain  Her weight is stable  She has no respiratory symptoms  She does routine exercise  Her performance status is normal       Objective:      Primary Diagnosis:     Left breast cancer  Stage IIB (pT2, N1b, M0) grade 3, ER positive, [place] weakly positive, HER-2 fish, borderline disease  Diagnosed in April 2014       Cancer Staging:  Cancer Staging  No matching staging information was found for the patient         Previous Hematologic/ Oncologic Treatment:      1  Adjuvant chemotherapy with dose dense AC X4 followed by weekly paclitaxel and Herceptin x12  Completed in September 2014  2  postmastectomy radiation therapy completed in December 2014    3  adjuvant Herceptin monotherapy, completed in June 2015       Current Hematologic/ Oncologic Treatment:       1  adjuvant hormonal therapy with anastrozole since October 2014      Disease Status:      SUNITHA status post mastectomy with lymph node dissection       Test Results:     Pathology:     3 5 cm of invasive ductal carcinoma, grade 3  3/11 axillary lymph node positive for metastatic disease  % positive, [place] 10-15% positive, HER-2 2+ disease  HER-2 Fish is equivocal  Second, HER-2 Fish was borderline    Stage IIB(pT2, pN1b, M0)        Radiology:     DEXA scan in May 2019 showed T-score-1 4, consistent with osteopenia  Mammography on March 2019  was benign  BI-RADS 2       Laboratory:           Physical Exam:        General Appearance:    Alert, oriented          Eyes:    PERRL   Ears:    Normal external ear canals, both ears   Nose:   Nares normal, septum midline   Throat:   Mucosa moist  Pharynx without injection  Neck:   Supple         Lungs:     Clear to auscultation bilaterally   Chest Wall:    No tenderness or deformity    Heart:    Regular rate and rhythm         Abdomen:     Soft, non-tender, bowel sounds +, no organomegaly               Extremities:   Extremities no cyanosis or edema         Skin:   no rash or icterus  Lymph nodes:   Cervical, supraclavicular, and axillary nodes normal   Neurologic:   CNII-XII intact, normal strength, sensation and reflexes     Throughout             Breast exam:   status post left mastectomy without reconstruction  No palpable abnormality in her left chest wall  Right breast exam is negative             ROS: Review of Systems   All other systems reviewed and are negative  Imaging: No results found        Labs:   Lab Results   Component Value Date    WBC 6 22 11/23/2018    HGB 14 3 11/23/2018    HCT 44 3 11/23/2018    MCV 92 11/23/2018     11/23/2018     Lab Results   Component Value Date     09/25/2015    K 3 6 06/23/2020     06/23/2020    CO2 25 06/23/2020    ANIONGAP 9 09/25/2015    BUN 13 06/23/2020    CREATININE 0 54 (L) 06/23/2020    GLUCOSE 88 09/25/2015    GLUF 80 06/23/2020    CALCIUM 8 9 06/23/2020    AST 21 06/23/2020    ALT 29 06/23/2020    ALKPHOS 52 06/23/2020    PROT 7 1 09/25/2015    BILITOT 0 55 09/25/2015    EGFR 93 06/23/2020         Current Medications: Reviewed  Allergies: Reviewed  PMH/FH/SH:  Reviewed      Vital Sign:    Body surface area is 1 77 meters squared      Wt Readings from Last 3 Encounters:   09/14/20 72 1 kg (159 lb)   07/28/20 73 2 kg (161 lb 6 4 oz)   06/30/20 73 5 kg (162 lb)        Temp Readings from Last 3 Encounters:   09/14/20 97 5 °F (36 4 °C) (Tympanic Core)   07/28/20 (!) 97 3 °F (36 3 °C) (Tympanic)   06/30/20 97 8 °F (36 6 °C)        BP Readings from Last 3 Encounters:   09/14/20 126/76   07/28/20 120/80   06/30/20 130/92         Pulse Readings from Last 3 Encounters:   09/14/20 75   07/28/20 91   06/30/20 84     @LASTSAO2(3)@

## 2020-10-10 ENCOUNTER — IMMUNIZATIONS (OUTPATIENT)
Dept: FAMILY MEDICINE CLINIC | Facility: CLINIC | Age: 75
End: 2020-10-10
Payer: MEDICARE

## 2020-10-10 DIAGNOSIS — Z23 NEED FOR INFLUENZA VACCINATION: Primary | ICD-10-CM

## 2020-10-10 PROCEDURE — G0008 ADMIN INFLUENZA VIRUS VAC: HCPCS

## 2020-10-10 PROCEDURE — 90662 IIV NO PRSV INCREASED AG IM: CPT

## 2020-12-30 ENCOUNTER — LAB (OUTPATIENT)
Dept: LAB | Facility: CLINIC | Age: 75
End: 2020-12-30
Payer: MEDICARE

## 2020-12-30 DIAGNOSIS — E78.2 MIXED HYPERLIPIDEMIA: ICD-10-CM

## 2020-12-30 DIAGNOSIS — I10 ESSENTIAL HYPERTENSION: ICD-10-CM

## 2020-12-30 DIAGNOSIS — E03.9 ACQUIRED HYPOTHYROIDISM: ICD-10-CM

## 2020-12-30 LAB
ALBUMIN SERPL BCP-MCNC: 3.7 G/DL (ref 3.5–5)
ALP SERPL-CCNC: 70 U/L (ref 46–116)
ALT SERPL W P-5'-P-CCNC: 26 U/L (ref 12–78)
ANION GAP SERPL CALCULATED.3IONS-SCNC: 6 MMOL/L (ref 4–13)
AST SERPL W P-5'-P-CCNC: 18 U/L (ref 5–45)
BILIRUB SERPL-MCNC: 0.69 MG/DL (ref 0.2–1)
BUN SERPL-MCNC: 11 MG/DL (ref 5–25)
CALCIUM SERPL-MCNC: 9.4 MG/DL (ref 8.3–10.1)
CHLORIDE SERPL-SCNC: 106 MMOL/L (ref 100–108)
CHOLEST SERPL-MCNC: 154 MG/DL (ref 50–200)
CO2 SERPL-SCNC: 28 MMOL/L (ref 21–32)
CREAT SERPL-MCNC: 0.54 MG/DL (ref 0.6–1.3)
GFR SERPL CREATININE-BSD FRML MDRD: 93 ML/MIN/1.73SQ M
GLUCOSE P FAST SERPL-MCNC: 89 MG/DL (ref 65–99)
HDLC SERPL-MCNC: 60 MG/DL
LDLC SERPL DIRECT ASSAY-MCNC: 79 MG/DL (ref 0–100)
POTASSIUM SERPL-SCNC: 3.8 MMOL/L (ref 3.5–5.3)
PROT SERPL-MCNC: 7.3 G/DL (ref 6.4–8.2)
SODIUM SERPL-SCNC: 140 MMOL/L (ref 136–145)
TSH SERPL DL<=0.05 MIU/L-ACNC: 4.02 UIU/ML (ref 0.36–3.74)

## 2020-12-30 PROCEDURE — 83718 ASSAY OF LIPOPROTEIN: CPT

## 2020-12-30 PROCEDURE — 83721 ASSAY OF BLOOD LIPOPROTEIN: CPT

## 2020-12-30 PROCEDURE — 84443 ASSAY THYROID STIM HORMONE: CPT

## 2020-12-30 PROCEDURE — 82465 ASSAY BLD/SERUM CHOLESTEROL: CPT

## 2020-12-30 PROCEDURE — 36415 COLL VENOUS BLD VENIPUNCTURE: CPT

## 2020-12-30 PROCEDURE — 80053 COMPREHEN METABOLIC PANEL: CPT

## 2021-01-15 ENCOUNTER — OFFICE VISIT (OUTPATIENT)
Dept: FAMILY MEDICINE CLINIC | Facility: CLINIC | Age: 76
End: 2021-01-15
Payer: MEDICARE

## 2021-01-15 VITALS
WEIGHT: 155 LBS | HEART RATE: 64 BPM | HEIGHT: 64 IN | TEMPERATURE: 97.1 F | DIASTOLIC BLOOD PRESSURE: 82 MMHG | SYSTOLIC BLOOD PRESSURE: 134 MMHG | BODY MASS INDEX: 26.46 KG/M2

## 2021-01-15 DIAGNOSIS — Z17.0 MALIGNANT NEOPLASM OF OVERLAPPING SITES OF LEFT BREAST IN FEMALE, ESTROGEN RECEPTOR POSITIVE (HCC): ICD-10-CM

## 2021-01-15 DIAGNOSIS — C50.812 MALIGNANT NEOPLASM OF OVERLAPPING SITES OF LEFT BREAST IN FEMALE, ESTROGEN RECEPTOR POSITIVE (HCC): ICD-10-CM

## 2021-01-15 DIAGNOSIS — E78.2 MIXED HYPERLIPIDEMIA: Primary | ICD-10-CM

## 2021-01-15 DIAGNOSIS — I10 ESSENTIAL HYPERTENSION: ICD-10-CM

## 2021-01-15 DIAGNOSIS — E03.9 ACQUIRED HYPOTHYROIDISM: ICD-10-CM

## 2021-01-15 DIAGNOSIS — Z79.811 USE OF ANASTROZOLE (ARIMIDEX): ICD-10-CM

## 2021-01-15 PROCEDURE — 99214 OFFICE O/P EST MOD 30 MIN: CPT | Performed by: FAMILY MEDICINE

## 2021-01-15 NOTE — PATIENT INSTRUCTIONS
Problem List Items Addressed This Visit     Hyperlipidemia - Primary     Cholesterol is excellent today  Continue on pravastatin  Recheck in 6 months  Relevant Orders    Comprehensive metabolic panel    Lipid panel    Hypertension     Blood pressure excellent  No changes  Relevant Orders    Comprehensive metabolic panel    Hypothyroidism     Stable at the moment  Her TSH is slightly higher than what it was before, but as she is not having any symptoms she elected not to adjust medications, which I agree with  Will check in 6 months, then adjust if necessary  Relevant Orders    TSH, 3rd generation    Malignant neoplasm of overlapping sites of left breast in female, estrogen receptor positive ([facility] [address] )     Stable with anastrozole  Following with Hematology/Oncology  No change at this moment  Use of anastrozole (Arimidex)     Stable  Follow with Hematology/Oncology, and Surgical Oncology  COVID 19 Instructions    Marcelsimona Lane was advised to limit contact with others to essential tasks such as getting food, medications, and medical care  Proper handwashing reviewed, and Hand sanitzer when washing is not available  If the patient develops symptoms of COVID 19, the patient should call the office as soon as possible  For 8038-9227 Flu season, it is strongly recommended that Flu Vaccinations be obtained  Please try to download [employer] Duo  Once you do download this on your phone, you will be prompted to add your phone number to the account  After that, he should receive a text from [employer], and use that code to verify your phone number  After that, you should be able to use [employer] Duo to receive and make video calls  Please download Microsoft Teams to your phone or computer  We will be transitioning to this platform for Video Visits  Instructions for downloading this are available from the office      We are committed to getting you vaccinated as soon as possible and will be closely following CDC and [facility]  guidelines as they are released and revised  Please refer to our COVID-19 vaccine webpage for the most up to date information on the vaccine and our distribution efforts      Malinda collado

## 2021-01-15 NOTE — ASSESSMENT & PLAN NOTE
Stable at the moment  Her TSH is slightly higher than what it was before, but as she is not having any symptoms she elected not to adjust medications, which I agree with  Will check in 6 months, then adjust if necessary

## 2021-01-15 NOTE — PROGRESS NOTES
Assessment and Plan:    Problem List Items Addressed This Visit     Hyperlipidemia - Primary     Cholesterol is excellent today  Continue on pravastatin  Recheck in 6 months  Relevant Orders    Comprehensive metabolic panel    Lipid panel    Hypertension     Blood pressure excellent  No changes  Relevant Orders    Comprehensive metabolic panel    Hypothyroidism     Stable at the moment  Her TSH is slightly higher than what it was before, but as she is not having any symptoms she elected not to adjust medications, which I agree with  Will check in 6 months, then adjust if necessary  Relevant Orders    TSH, 3rd generation    Malignant neoplasm of overlapping sites of left breast in female, estrogen receptor positive ([facility] )     Stable with anastrozole  Following with Hematology/Oncology  No change at this moment  Use of anastrozole (Arimidex)     Stable  Follow with Hematology/Oncology, and Surgical Oncology  Diagnoses and all orders for this visit:    Mixed hyperlipidemia  -     Comprehensive metabolic panel; Future  -     Lipid panel; Future    Malignant neoplasm of overlapping sites of left breast in female, estrogen receptor positive ([facility] )    Essential hypertension  -     Comprehensive metabolic panel; Future    Acquired hypothyroidism  -     TSH, 3rd generation; Future    Use of anastrozole (Arimidex)              Subjective:      Patient ID: Libia West is a 77 y o  female  CC:    Chief Complaint   Patient presents with    Follow-up    Hypertension       HPI:    Patient is here to follow-up on multiple issues  Hypertension:  Currently on HCTZ only  Denies orthostasis  Hypothyroidism:  Currently on Synthroid at 25 mcg  Her TSH is slightly different versus before  She is not currently having any symptoms, however  Hyperlipidemia:  Currently on pravastatin  Also using fish oil  Breast cancer:   Following with Hematology/Oncology  Currently on anastrozole  The following portions of the patient's history were reviewed and updated as appropriate: allergies, current medications, past family history, past medical history, past social history, past surgical history and problem list       Review of Systems   Constitutional: Negative  HENT: Negative  Eyes: Negative  Respiratory: Negative  Cardiovascular: Negative  Gastrointestinal: Negative  Endocrine: Negative  Genitourinary: Negative  Musculoskeletal: Negative  Skin: Negative  Allergic/Immunologic: Negative  Neurological: Negative  Hematological: Negative  Psychiatric/Behavioral: Negative  Data to review:   Sodium 140, potassium 3 8, calcium 9 4  Creatinine 0 54, GFR:  93  AST 18, ALT 26  Blood sugar 89  Total cholesterol 154, LDL 79, HDL 60  TSH 4 02     Objective:    Vitals:    01/15/21 0807   BP: 134/82   BP Location: Left arm   Patient Position: Sitting   Pulse: 64   Temp: (!) 97 1 °F (36 2 °C)   TempSrc: Temporal   Weight: 70 3 kg (155 lb)   Height: 5' 4" (1 626 m)        Physical Exam  Vitals signs and nursing note reviewed  Constitutional:       Appearance: Normal appearance  HENT:      Head: Normocephalic  Cardiovascular:      Rate and Rhythm: Normal rate and regular rhythm  Pulses: Normal pulses  Carotid pulses are 2+ on the right side and 2+ on the left side  Heart sounds: Normal heart sounds  No murmur  No friction rub  No gallop  Pulmonary:      Effort: Pulmonary effort is normal  No respiratory distress  Breath sounds: Normal breath sounds  No stridor  No wheezing, rhonchi or rales  Chest:      Chest wall: No tenderness  Neurological:      Mental Status: She is alert  BMI Counseling: Body mass index is 26 61 kg/m²   The BMI is above normal  Nutrition recommendations include decreasing portion sizes, encouraging healthy choices of fruits and vegetables, decreasing fast food intake, consuming healthier snacks, limiting drinks that contain sugar, moderation in carbohydrate intake, increasing intake of lean protein, reducing intake of saturated and trans fat and reducing intake of cholesterol  Exercise recommendations include exercising 3-5 times per week  No pharmacotherapy was ordered

## 2021-02-26 DIAGNOSIS — C50.919 MALIGNANT NEOPLASM OF BREAST IN FEMALE, ESTROGEN RECEPTOR POSITIVE, UNSPECIFIED LATERALITY, UNSPECIFIED SITE OF BREAST (HCC): ICD-10-CM

## 2021-02-26 DIAGNOSIS — Z17.0 MALIGNANT NEOPLASM OF BREAST IN FEMALE, ESTROGEN RECEPTOR POSITIVE, UNSPECIFIED LATERALITY, UNSPECIFIED SITE OF BREAST (HCC): ICD-10-CM

## 2021-02-26 RX ORDER — ANASTROZOLE 1 MG/1
TABLET ORAL
Qty: 90 TABLET | Refills: 1 | Status: SHIPPED | OUTPATIENT
Start: 2021-02-26 | End: 2021-06-21

## 2021-04-19 ENCOUNTER — APPOINTMENT (OUTPATIENT)
Dept: LAB | Facility: CLINIC | Age: 76
End: 2021-04-19
Payer: MEDICARE

## 2021-04-19 ENCOUNTER — OFFICE VISIT (OUTPATIENT)
Dept: FAMILY MEDICINE CLINIC | Facility: CLINIC | Age: 76
End: 2021-04-19
Payer: MEDICARE

## 2021-04-19 VITALS
DIASTOLIC BLOOD PRESSURE: 86 MMHG | HEIGHT: 64 IN | TEMPERATURE: 97.6 F | BODY MASS INDEX: 25.95 KG/M2 | SYSTOLIC BLOOD PRESSURE: 140 MMHG | WEIGHT: 152 LBS | HEART RATE: 84 BPM

## 2021-04-19 DIAGNOSIS — M79.601 PAIN IN BOTH UPPER EXTREMITIES: ICD-10-CM

## 2021-04-19 DIAGNOSIS — M60.9 MYOSITIS OF UPPER EXTREMITY, UNSPECIFIED LATERALITY, UNSPECIFIED MYOSITIS TYPE: ICD-10-CM

## 2021-04-19 DIAGNOSIS — I10 ESSENTIAL HYPERTENSION: ICD-10-CM

## 2021-04-19 DIAGNOSIS — E03.9 ACQUIRED HYPOTHYROIDISM: ICD-10-CM

## 2021-04-19 DIAGNOSIS — M79.601 PAIN IN BOTH UPPER EXTREMITIES: Primary | ICD-10-CM

## 2021-04-19 DIAGNOSIS — M79.602 PAIN IN BOTH UPPER EXTREMITIES: Primary | ICD-10-CM

## 2021-04-19 DIAGNOSIS — M79.10 MYALGIA: ICD-10-CM

## 2021-04-19 DIAGNOSIS — M79.602 PAIN IN BOTH UPPER EXTREMITIES: ICD-10-CM

## 2021-04-19 DIAGNOSIS — T50.905A ADVERSE EFFECT OF DRUG, INITIAL ENCOUNTER: ICD-10-CM

## 2021-04-19 DIAGNOSIS — Z79.82 ASPIRIN LONG-TERM USE: ICD-10-CM

## 2021-04-19 LAB
25(OH)D3 SERPL-MCNC: 45.1 NG/ML (ref 30–100)
ANION GAP SERPL CALCULATED.3IONS-SCNC: 8 MMOL/L (ref 4–13)
BUN SERPL-MCNC: 13 MG/DL (ref 5–25)
CALCIUM SERPL-MCNC: 9.7 MG/DL (ref 8.3–10.1)
CHLORIDE SERPL-SCNC: 104 MMOL/L (ref 100–108)
CK SERPL-CCNC: 60 U/L (ref 26–192)
CO2 SERPL-SCNC: 26 MMOL/L (ref 21–32)
CREAT SERPL-MCNC: 0.47 MG/DL (ref 0.6–1.3)
D DIMER PPP FEU-MCNC: 1.16 UG/ML FEU
ERYTHROCYTE [DISTWIDTH] IN BLOOD BY AUTOMATED COUNT: 13.1 % (ref 11.6–15.1)
ERYTHROCYTE [SEDIMENTATION RATE] IN BLOOD: 59 MM/HOUR (ref 0–29)
GFR SERPL CREATININE-BSD FRML MDRD: 98 ML/MIN/1.73SQ M
GLUCOSE P FAST SERPL-MCNC: 83 MG/DL (ref 65–99)
HCT VFR BLD AUTO: 43 % (ref 34.8–46.1)
HGB BLD-MCNC: 13.7 G/DL (ref 11.5–15.4)
MCH RBC QN AUTO: 29.5 PG (ref 26.8–34.3)
MCHC RBC AUTO-ENTMCNC: 31.9 G/DL (ref 31.4–37.4)
MCV RBC AUTO: 93 FL (ref 82–98)
PLATELET # BLD AUTO: 422 THOUSANDS/UL (ref 149–390)
PMV BLD AUTO: 9.7 FL (ref 8.9–12.7)
POTASSIUM SERPL-SCNC: 3.7 MMOL/L (ref 3.5–5.3)
RBC # BLD AUTO: 4.65 MILLION/UL (ref 3.81–5.12)
SODIUM SERPL-SCNC: 138 MMOL/L (ref 136–145)
T4 FREE SERPL-MCNC: 1.3 NG/DL (ref 0.76–1.46)
TSH SERPL DL<=0.05 MIU/L-ACNC: 4.02 UIU/ML (ref 0.36–3.74)
WBC # BLD AUTO: 8.1 THOUSAND/UL (ref 4.31–10.16)

## 2021-04-19 PROCEDURE — 84439 ASSAY OF FREE THYROXINE: CPT

## 2021-04-19 PROCEDURE — 86747 PARVOVIRUS ANTIBODY: CPT

## 2021-04-19 PROCEDURE — 86038 ANTINUCLEAR ANTIBODIES: CPT

## 2021-04-19 PROCEDURE — 82550 ASSAY OF CK (CPK): CPT

## 2021-04-19 PROCEDURE — 80048 BASIC METABOLIC PNL TOTAL CA: CPT

## 2021-04-19 PROCEDURE — 85379 FIBRIN DEGRADATION QUANT: CPT

## 2021-04-19 PROCEDURE — 84443 ASSAY THYROID STIM HORMONE: CPT

## 2021-04-19 PROCEDURE — 99214 OFFICE O/P EST MOD 30 MIN: CPT | Performed by: FAMILY MEDICINE

## 2021-04-19 PROCEDURE — 82306 VITAMIN D 25 HYDROXY: CPT

## 2021-04-19 PROCEDURE — 86430 RHEUMATOID FACTOR TEST QUAL: CPT

## 2021-04-19 PROCEDURE — 85027 COMPLETE CBC AUTOMATED: CPT

## 2021-04-19 PROCEDURE — 36415 COLL VENOUS BLD VENIPUNCTURE: CPT

## 2021-04-19 PROCEDURE — 85652 RBC SED RATE AUTOMATED: CPT

## 2021-04-19 PROCEDURE — 86618 LYME DISEASE ANTIBODY: CPT

## 2021-04-19 RX ORDER — CELECOXIB 200 MG/1
CAPSULE ORAL
Qty: 15 CAPSULE | Refills: 1 | Status: SHIPPED | OUTPATIENT
Start: 2021-04-19 | End: 2021-08-09

## 2021-04-19 NOTE — PATIENT INSTRUCTIONS
Start Celebrex 200 mg 1 daily with food for arm pain   Complete blood work today   Return in 1 week if no improvement in 2 weeks if not fully resolved

## 2021-04-19 NOTE — PROGRESS NOTES
Assessment and Plan:  1  Arm pain  2  Myalgias/ myositis   Celebrex is ordered for symptomatic relief  Will check blood work to rule out any other etiology  3  I favor reaction to recent COVID immunization  4  Hypothyroidism will check TSH  5  Hypertension, stable continue present therapy   6  Long-term aspirin use, because of this Celebrex is ordered  7  If not improved 1 week resolved in 2 week return to office    Problem List Items Addressed This Visit        Endocrine    Hypothyroidism      TSH ordered            Cardiovascular and Mediastinum    Hypertension      Stable continue present therapy            Other    Myalgia      Blood work ordered         Aspirin long-term use      Because of this Celebrex is ordered           Other Visit Diagnoses     Pain in both upper extremities    -  Primary    Adverse effect of drug, initial encounter        Myositis of upper extremity, unspecified laterality, unspecified myositis type                     Diagnoses and all orders for this visit:    Pain in both upper extremities    Adverse effect of drug, initial encounter    Myalgia    Myositis of upper extremity, unspecified laterality, unspecified myositis type    Acquired hypothyroidism    Essential hypertension    Aspirin long-term use              Subjective:      Patient ID: Arsenio Hilario is a 77 y o  female  CC:    Chief Complaint   Patient presents with    Arm Pain     c/o B/L upper arm pain and other pains ever since having the [address] vaccines  mjs       HPI:     Patient got her 2nd mood your neck COVID vaccine 03/26/2021  The next day she felt fevers in [place] since that time she had bilateral arm pain  Denies any weakness  No fever chills or rash  Has been using over-the-counter medication with limited relief    No swelling      The following portions of the patient's history were reviewed and updated as appropriate: allergies, current medications, past family history, past medical history, past social history, past surgical history and problem list       Review of Systems   Constitutional:        HPI   HENT: Negative  Eyes: Negative  Respiratory: Negative  Cardiovascular:        HPI   Gastrointestinal: Negative  Endocrine: Negative  Genitourinary: Negative  Musculoskeletal:        HPI   Skin:        HPI   Allergic/Immunologic: Negative  Neurological: Negative for weakness  Hematological: Negative  Psychiatric/Behavioral: Negative  Data to review:       Objective:    Vitals:    04/19/21 1013 04/19/21 1021   BP: 140/96 140/86   Pulse: 84    Temp: 97 6 °F (36 4 °C)    Weight: 68 9 kg (152 lb)    Height: 5' 4" (1 626 m)         Physical Exam  Vitals signs and nursing note reviewed  Constitutional:       Appearance: Normal appearance  HENT:      Head: Normocephalic and atraumatic  Eyes:      General: No scleral icterus  Neck:      Musculoskeletal: Neck supple  Vascular: No carotid bruit  Cardiovascular:      Rate and Rhythm: Normal rate and regular rhythm  Pulses: Normal pulses  Heart sounds: Normal heart sounds  Pulmonary:      Effort: Pulmonary effort is normal       Breath sounds: Normal breath sounds  Musculoskeletal:         General: No swelling, tenderness, deformity or signs of injury  Right lower leg: No edema  Left lower leg: No edema  Comments: Bilateral upper extremities are neurovascularly intact negative point tenderness negative edema negative rash   Skin:     General: Skin is warm and dry  Neurological:      Mental Status: She is alert  Sensory: No sensory deficit  Motor: No weakness        Coordination: Coordination normal    Psychiatric:         Mood and Affect: Mood normal

## 2021-04-20 ENCOUNTER — HOSPITAL ENCOUNTER (OUTPATIENT)
Dept: CT IMAGING | Facility: HOSPITAL | Age: 76
Discharge: HOME | End: 2021-04-20
Payer: MEDICARE

## 2021-04-20 ENCOUNTER — HOSPITAL ENCOUNTER (OUTPATIENT)
Dept: NON INVASIVE DIAGNOSTICS | Facility: HOSPITAL | Age: 76
Discharge: HOME | End: 2021-04-20
Payer: MEDICARE

## 2021-04-20 ENCOUNTER — TELEPHONE (OUTPATIENT)
Dept: FAMILY MEDICINE CLINIC | Facility: CLINIC | Age: 76
End: 2021-04-20

## 2021-04-20 DIAGNOSIS — R79.89 POSITIVE D-DIMER: ICD-10-CM

## 2021-04-20 DIAGNOSIS — M79.631 PAIN IN RIGHT FOREARM: ICD-10-CM

## 2021-04-20 DIAGNOSIS — R79.89 POSITIVE D-DIMER: Primary | ICD-10-CM

## 2021-04-20 LAB
B BURGDOR IGG+IGM SER-ACNC: 20
B19V IGG SER IA-ACNC: 2.6 INDEX (ref 0–0.8)
B19V IGM SER IA-ACNC: 0.2 INDEX (ref 0–0.8)
RHEUMATOID FACT SER QL LA: NEGATIVE

## 2021-04-20 PROCEDURE — 93970 EXTREMITY STUDY: CPT

## 2021-04-20 PROCEDURE — G1004 CDSM NDSC: HCPCS

## 2021-04-20 PROCEDURE — 71275 CT ANGIOGRAPHY CHEST: CPT

## 2021-04-20 PROCEDURE — 93970 EXTREMITY STUDY: CPT | Performed by: SURGERY

## 2021-04-20 RX ADMIN — IOHEXOL 65 ML: 350 INJECTION, SOLUTION INTRAVENOUS at 11:19

## 2021-04-20 NOTE — TELEPHONE ENCOUNTER
Of note  When nursing called patient about the D-dimer and testing ordered  Patient now has pain in arms and legs

## 2021-04-20 NOTE — PROGRESS NOTES
Please see result note    Patient has a positive D-dimer lower extremity venous duplex were ordered CT was ordered patient was complaining of arm pain I will get upper extremity venous duplex is rule out DVTs upper extremity

## 2021-04-20 NOTE — TELEPHONE ENCOUNTER
Notified by vascular lab patient had venous duplex upper lower extremities for positive D-dimer today  Both tests negative for DVT    Please notify patient that no clots were seen

## 2021-04-21 LAB — RYE IGE QN: NEGATIVE

## 2021-04-26 ENCOUNTER — OFFICE VISIT (OUTPATIENT)
Dept: FAMILY MEDICINE CLINIC | Facility: CLINIC | Age: 76
End: 2021-04-26
Payer: MEDICARE

## 2021-04-26 VITALS
TEMPERATURE: 97.4 F | WEIGHT: 152.25 LBS | HEART RATE: 80 BPM | SYSTOLIC BLOOD PRESSURE: 138 MMHG | BODY MASS INDEX: 25.99 KG/M2 | HEIGHT: 64 IN | DIASTOLIC BLOOD PRESSURE: 86 MMHG

## 2021-04-26 DIAGNOSIS — M35.3 POLYMYALGIA RHEUMATICA (HCC): ICD-10-CM

## 2021-04-26 DIAGNOSIS — I10 ESSENTIAL HYPERTENSION: ICD-10-CM

## 2021-04-26 DIAGNOSIS — C50.812 MALIGNANT NEOPLASM OF OVERLAPPING SITES OF LEFT BREAST IN FEMALE, ESTROGEN RECEPTOR POSITIVE (HCC): ICD-10-CM

## 2021-04-26 DIAGNOSIS — Z17.0 MALIGNANT NEOPLASM OF OVERLAPPING SITES OF LEFT BREAST IN FEMALE, ESTROGEN RECEPTOR POSITIVE (HCC): ICD-10-CM

## 2021-04-26 DIAGNOSIS — M60.9 MYOSITIS, UNSPECIFIED MYOSITIS TYPE, UNSPECIFIED SITE: Primary | ICD-10-CM

## 2021-04-26 PROCEDURE — 99214 OFFICE O/P EST MOD 30 MIN: CPT | Performed by: PHYSICIAN ASSISTANT

## 2021-04-26 RX ORDER — PREDNISONE 10 MG/1
TABLET ORAL
Qty: 21 TABLET | Refills: 0 | Status: SHIPPED | OUTPATIENT
Start: 2021-04-26 | End: 2021-05-04

## 2021-04-26 NOTE — PATIENT INSTRUCTIONS
Assessment/plan:  1  Polymyalgia rheumatica/myositis-patient seems to be having acute symptoms over the past 3-4 weeks  Interestingly this did occur after COVID vaccine but I am not sure if it is related  She is having weakness getting up from a seated position and trouble with weakness in her arms  I would recommend a 2 week course of prednisone, 20 mg daily for the 1st week, then 10 mg daily for 1 week  We will schedule repeat sed rate in the next 2 weeks and also consult Rheumatology for further investigation and treatment options if this would be going longer term  I will follow-up with her in 2 weeks  Patient verbalizes understanding and agreement with plan  2  Breast cancer-stable post mastectomy, on Arimidex  3  Hypertension-stable, no medication changes

## 2021-04-26 NOTE — PROGRESS NOTES
Assessment and Plan:  Patient Instructions   Assessment/plan:  1  Polymyalgia rheumatica/myositis-patient seems to be having acute symptoms over the past 3-4 weeks  Interestingly this did occur after COVID vaccine but I am not sure if it is related  She is having weakness getting up from a seated position and trouble with weakness in her arms  I would recommend a 2 week course of prednisone, 20 mg daily for the 1st week, then 10 mg daily for 1 week  We will schedule repeat sed rate in the next 2 weeks and also consult Rheumatology for further investigation and treatment options if this would be going longer term  I will follow-up with her in 2 weeks  Patient verbalizes understanding and agreement with plan  2  Breast cancer-stable post mastectomy, on Arimidex  3  Hypertension-stable, no medication changes  Problem List Items Addressed This Visit        Cardiovascular and Mediastinum    Hypertension       Other    Malignant neoplasm of overlapping sites of left breast in female, estrogen receptor positive ([facility] )      Other Visit Diagnoses     Myositis, unspecified myositis type, unspecified site    -  Primary    Relevant Medications    predniSONE 10 mg tablet    Other Relevant Orders    Ambulatory referral to Rheumatology    Sedimentation rate, automated    Polymyalgia rheumatica (HCC)        Relevant Medications    predniSONE 10 mg tablet    Other Relevant Orders    Ambulatory referral to Rheumatology    Sedimentation rate, automated                 Diagnoses and all orders for this visit:    Myositis, unspecified myositis type, unspecified site  -     predniSONE 10 mg tablet; Take 2 tablets daily with breakfast for 1 week, then 1 tablet daily with breakfast for another week  -     Ambulatory referral to Rheumatology; Future  -     Sedimentation rate, automated; Future    Polymyalgia rheumatica (HCC)  -     predniSONE 10 mg tablet;  Take 2 tablets daily with breakfast for 1 week, then 1 tablet daily with breakfast for another week  -     Ambulatory referral to Rheumatology; Future  -     Sedimentation rate, automated; Future    Malignant neoplasm of overlapping sites of left breast in female, estrogen receptor positive ([facility] )    Essential hypertension              Subjective:      Patient ID: Eddie Pisano is a 77 y o  female  CC:    Chief Complaint   Patient presents with    Joint Pain     pain has not resolved in upper extremeties  mgb       HPI:    HPI:  This is a 27-year-old female who presents to the office with ongoing muscle aches that have been prominent and the upper extremities and the lower extremities for the past several weeks  They seem to be progressively worsening since she had the COVID vaccine almost a month ago  She has had extensive blood work and vascular studies including CTA of the chest which have been normal   This was because she had a positive D-dimer test   Her sed rate was also elevated at 59  She states that she normally enjoys cycling but is concerned to go because she does not know that she has the strength of the legs to get over the saddle  She has also been having trouble with her arms and getting out of a seated position from a chair  This all seemed to happen over the past 3-4 weeks  Previously she was quite active  She has been using Celebrex in the morning it does seem to have some help by later in the day at 10 or 11 a m  Shar Driscoll The following portions of the patient's history were reviewed and updated as appropriate: allergies, current medications, past family history, past medical history, past social history, past surgical history and problem list       Review of Systems   Constitutional: Negative for chills, fatigue and fever  HENT: Negative for congestion, ear pain and sinus pressure  Eyes: Negative for visual disturbance  Respiratory: Negative for cough, chest tightness and shortness of breath      Cardiovascular: Negative for chest pain and palpitations  Gastrointestinal: Negative for diarrhea, nausea and vomiting  Endocrine: Negative for polyuria  Genitourinary: Negative for dysuria and frequency  Musculoskeletal: Positive for myalgias  Negative for arthralgias  Skin: Negative for pallor and rash  Neurological: Positive for weakness  Negative for dizziness, light-headedness, numbness and headaches  Psychiatric/Behavioral: Negative for agitation, behavioral problems and sleep disturbance  All other systems reviewed and are negative  Data to review:       Objective:    Vitals:    04/26/21 1404   BP: 138/86   BP Location: Left arm   Patient Position: Sitting   Cuff Size: Large   Pulse: 80   Temp: (!) 97 4 °F (36 3 °C)   TempSrc: Temporal   Weight: 69 1 kg (152 lb 4 oz)   Height: 5' 4" (1 626 m)        Physical Exam  Constitutional:       General: She is not in acute distress  Appearance: Normal appearance  HENT:      Head: Normocephalic and atraumatic  Right Ear: Tympanic membrane normal       Left Ear: Tympanic membrane normal       Nose: No congestion or rhinorrhea  Eyes:      Conjunctiva/sclera: Conjunctivae normal       Pupils: Pupils are equal, round, and reactive to light  Neck:      Musculoskeletal: Normal range of motion and neck supple  No muscular tenderness  Vascular: No carotid bruit  Cardiovascular:      Rate and Rhythm: Normal rate and regular rhythm  Heart sounds: No murmur  Pulmonary:      Effort: Pulmonary effort is normal  No respiratory distress  Breath sounds: Normal breath sounds  Abdominal:      Palpations: Abdomen is soft  Musculoskeletal: Normal range of motion  Lymphadenopathy:      Cervical: No cervical adenopathy  Skin:     General: Skin is warm  Capillary Refill: Capillary refill takes less than 2 seconds  Neurological:      General: No focal deficit present  Mental Status: She is alert and oriented to person, place, and time     Psychiatric: Mood and Affect: Mood normal

## 2021-04-27 ENCOUNTER — TELEPHONE (OUTPATIENT)
Dept: FAMILY MEDICINE CLINIC | Facility: CLINIC | Age: 76
End: 2021-04-27

## 2021-04-27 NOTE — TELEPHONE ENCOUNTER
PATIENT CALLED JUST TO LET US KNOW HOW PLEASED SHE IS WITH PETE SCOTT AND HOW HAPPY SHE IS WITH THE CARE SHE IS RECEIVING    SHE FEELS SHE IS RIGHT ON TARGET WITH THE CARE SHE GOT FROM HIM AND SHE PLANS ON FOLLOWING UP WITH EVERYTHING HE WANTS HER TO DO   SHE STATES THAT WITH EVERYTHING THAT IS GOING ON AROUND US SHE WANTED TO CALL AND JUST LET US KNOW THAT SHE IS VERY PLEASED WITH THE CARE SHE IS RECEIVING FROM PETE AND THE REST OF THE STAFF AND THAT SHE WANTS US ALL TO STAY SAFE OUT THERE!!!

## 2021-05-03 ENCOUNTER — APPOINTMENT (OUTPATIENT)
Dept: LAB | Facility: CLINIC | Age: 76
End: 2021-05-03
Payer: MEDICARE

## 2021-05-03 DIAGNOSIS — M60.9 MYOSITIS, UNSPECIFIED MYOSITIS TYPE, UNSPECIFIED SITE: ICD-10-CM

## 2021-05-03 DIAGNOSIS — M35.3 POLYMYALGIA RHEUMATICA (HCC): ICD-10-CM

## 2021-05-03 LAB — ERYTHROCYTE [SEDIMENTATION RATE] IN BLOOD: 45 MM/HOUR (ref 0–29)

## 2021-05-03 PROCEDURE — 85652 RBC SED RATE AUTOMATED: CPT

## 2021-05-03 PROCEDURE — 36415 COLL VENOUS BLD VENIPUNCTURE: CPT

## 2021-05-04 ENCOUNTER — TELEPHONE (OUTPATIENT)
Dept: FAMILY MEDICINE CLINIC | Facility: CLINIC | Age: 76
End: 2021-05-04

## 2021-05-04 DIAGNOSIS — M35.3 POLYMYALGIA RHEUMATICA (HCC): ICD-10-CM

## 2021-05-04 DIAGNOSIS — M60.9 MYOSITIS, UNSPECIFIED MYOSITIS TYPE, UNSPECIFIED SITE: Primary | ICD-10-CM

## 2021-05-04 RX ORDER — PREDNISONE 1 MG/1
5 TABLET ORAL DAILY
Qty: 14 TABLET | Refills: 0 | Status: SHIPPED | OUTPATIENT
Start: 2021-05-04 | End: 2021-08-09

## 2021-05-04 NOTE — TELEPHONE ENCOUNTER
----- Message from Jeff Ramírez sent at 5/4/2021 10:34 AM EDT -----  Pt notified of BW results and recommendation to continue prednisone  She states she took her last Prednisone Sunday  She is feeling better, but has fear of pain returning  Should she have another course of prednisone?

## 2021-05-12 ENCOUNTER — OFFICE VISIT (OUTPATIENT)
Dept: FAMILY MEDICINE CLINIC | Facility: CLINIC | Age: 76
End: 2021-05-12
Payer: MEDICARE

## 2021-05-12 VITALS
BODY MASS INDEX: 25.44 KG/M2 | HEART RATE: 88 BPM | HEIGHT: 64 IN | SYSTOLIC BLOOD PRESSURE: 138 MMHG | DIASTOLIC BLOOD PRESSURE: 90 MMHG | WEIGHT: 149 LBS

## 2021-05-12 DIAGNOSIS — Z17.0 MALIGNANT NEOPLASM OF OVERLAPPING SITES OF LEFT BREAST IN FEMALE, ESTROGEN RECEPTOR POSITIVE (HCC): ICD-10-CM

## 2021-05-12 DIAGNOSIS — C50.812 MALIGNANT NEOPLASM OF OVERLAPPING SITES OF LEFT BREAST IN FEMALE, ESTROGEN RECEPTOR POSITIVE (HCC): ICD-10-CM

## 2021-05-12 DIAGNOSIS — E03.9 ACQUIRED HYPOTHYROIDISM: ICD-10-CM

## 2021-05-12 DIAGNOSIS — I10 ESSENTIAL HYPERTENSION: ICD-10-CM

## 2021-05-12 DIAGNOSIS — R79.89 POSITIVE D-DIMER: ICD-10-CM

## 2021-05-12 DIAGNOSIS — Z79.811 USE OF ANASTROZOLE (ARIMIDEX): ICD-10-CM

## 2021-05-12 DIAGNOSIS — M79.10 MYALGIA: Primary | ICD-10-CM

## 2021-05-12 DIAGNOSIS — D75.839 THROMBOCYTHEMIA: ICD-10-CM

## 2021-05-12 DIAGNOSIS — E78.2 MIXED HYPERLIPIDEMIA: ICD-10-CM

## 2021-05-12 PROBLEM — Z12.39 BREAST CANCER SCREENING, HIGH RISK PATIENT: Status: RESOLVED | Noted: 2019-04-03 | Resolved: 2021-05-12

## 2021-05-12 PROCEDURE — 99214 OFFICE O/P EST MOD 30 MIN: CPT | Performed by: FAMILY MEDICINE

## 2021-05-12 NOTE — ASSESSMENT & PLAN NOTE
Patient feels that she is doing much better as far as the myalgias  At this point, I would recommend finish the prednisone  I do not feel that repeating this sed rate would be beneficial as it was coming down

## 2021-05-12 NOTE — PATIENT INSTRUCTIONS
Problem List Items Addressed This Visit     Hyperlipidemia     Patient is still on Pravastatin  She feels she is doing well  No concerns  Hypertension     Stable BP  No changes  Relevant Orders    CBC and differential    TSH, 3rd generation    Hypothyroidism     TSH was a bit elevated  Based on this, recheck TSH  Relevant Orders    TSH, 3rd generation    Malignant neoplasm of overlapping sites of left breast in female, estrogen receptor positive ([facility] )     On Arimidex  No changes  Follow with Heme/onc  Myalgia - Primary     Patient feels that she is doing much better as far as the myalgias  At this point, I would recommend finish the prednisone  I do not feel that repeating this sed rate would be beneficial as it was coming down  Use of anastrozole (Arimidex)     Stable  Follow with Hematology/Oncology  Other Visit Diagnoses     Positive D-dimer        Prior testing for D-dimer showed negative for clot or PE  Recheck to confirm coming down  Relevant Orders    D-dimer, quantitative    Thrombocythemia (HCC)        Platelets were slightly elevated  Check CBC again  Relevant Orders    CBC and differential          COVID 19 Instructions    [facility] was advised to limit contact with others to essential tasks such as getting food, medications, and medical care  Proper handwashing reviewed, and Hand sanitzer when washing is not available  If the patient develops symptoms of COVID 19, the patient should call the office as soon as possible  For 2009-3274 Flu season, it is strongly recommended that Flu Vaccinations be obtained  Virtual Visits may be conducted in several ways: [place]: You should get a text message when the provider is ready to see you  Click on the link in the text message, and the call should start  You will need to type in your name, and allow camera and microphone access    This is HIPPA compliant, and secure  Please try to download [employer] Duo  Once you do download this on your phone, you will be prompted to add your phone number to the account  After that, he should receive a text from [employer], and use that code to verify your phone number  After that, you should be able to use Google Duo to receive and make video calls  Please download Microsoft Teams to your phone or computer  You would get an email with the meeting after scheduling with the office  You will Join the meeting, and wait there till the provider joins as well  Instructions for downloading this are available from the office  This is HIPPA compliant, and secure  We are committed to getting you vaccinated as soon as possible and will be closely following CDC and [facility]  guidelines as they are released and revised  Please refer to our COVID-19 vaccine webpage for the most up to date information on the vaccine and our distribution efforts      Malinda collado

## 2021-05-12 NOTE — PROGRESS NOTES
Assessment and Plan:    Problem List Items Addressed This Visit     Hyperlipidemia     Patient is still on Pravastatin  She feels she is doing well  No concerns  Hypertension     Stable BP  No changes  Relevant Orders    CBC and differential    TSH, 3rd generation    Hypothyroidism     TSH was a bit elevated  Based on this, recheck TSH  Relevant Orders    TSH, 3rd generation    Malignant neoplasm of overlapping sites of left breast in female, estrogen receptor positive ([facility] )     On Arimidex  No changes  Follow with Heme/onc  Myalgia - Primary     Patient feels that she is doing much better as far as the myalgias  At this point, I would recommend finish the prednisone  I do not feel that repeating this sed rate would be beneficial as it was coming down  Use of anastrozole (Arimidex)     Stable  Follow with Hematology/Oncology  Other Visit Diagnoses     Positive D-dimer        Prior testing for D-dimer showed negative for clot or PE  Recheck to confirm coming down  Relevant Orders    D-dimer, quantitative    Thrombocythemia (HCC)        Platelets were slightly elevated  Check CBC again  Relevant Orders    CBC and differential                 Diagnoses and all orders for this visit:    Myalgia    Positive D-dimer  Comments:  Prior testing for D-dimer showed negative for clot or PE  Recheck to confirm coming down  Orders:  -     D-dimer, quantitative; Future    Mixed hyperlipidemia    Essential hypertension  -     CBC and differential; Future  -     TSH, 3rd generation; Future    Acquired hypothyroidism  -     TSH, 3rd generation; Future    Malignant neoplasm of overlapping sites of left breast in female, estrogen receptor positive ([facility] )    Use of anastrozole (Arimidex)    [facility]  Comments:  Platelets were slightly elevated  Check CBC again    Orders:  -     CBC and differential; Future              Subjective:      Patient ID: James Davis is a 77 y o  female  CC:    Chief Complaint   Patient presents with    Follow-up     2 week follow up to B/L arm pain from the [address] vaccine  mjs    Results       HPI:    Patient is here to follow-up on the myalgia that she had after her 2nd met during a vaccine  Patient initially was placed on Celebrex, but there was no significant improvement  After that, she was started on steroids, and that did seem to make a difference  Sed rate came down at that point  There was some discussion about whether not this was fibromyalgia, verses truly a reaction to [address] vaccine  At this time, the patient feels that she is doing much better  She is able to do all of her normal activities without any hindrance or issues  She was concerned that perhaps she was told to see Rheumatology, and that she does not think she needs to go  Patient does have history of breast cancer  Currently on Arimidex  Hyperlipidemia:  Currently on pravastatin at 40 mg        The following portions of the patient's history were reviewed and updated as appropriate: allergies, current medications, past family history, past medical history, past social history, past surgical history and problem list       Review of Systems      Data to review:       Objective:    Vitals:    05/12/21 1310   BP: 138/90   Pulse: 88   Weight: 67 6 kg (149 lb)   Height: 5' 4" (1 626 m)        Physical Exam

## 2021-05-13 ENCOUNTER — ANNUAL EXAM (OUTPATIENT)
Dept: GYNECOLOGY | Facility: CLINIC | Age: 76
End: 2021-05-13
Payer: MEDICARE

## 2021-05-13 VITALS
BODY MASS INDEX: 25.64 KG/M2 | WEIGHT: 150.2 LBS | HEIGHT: 64 IN | DIASTOLIC BLOOD PRESSURE: 80 MMHG | SYSTOLIC BLOOD PRESSURE: 112 MMHG

## 2021-05-13 DIAGNOSIS — Z78.0 MENOPAUSE: Primary | ICD-10-CM

## 2021-05-13 DIAGNOSIS — Z13.820 ENCOUNTER FOR SCREENING FOR OSTEOPOROSIS: ICD-10-CM

## 2021-05-13 DIAGNOSIS — Z01.411 ENCOUNTER FOR GYNECOLOGICAL EXAMINATION WITH ABNORMAL FINDING: ICD-10-CM

## 2021-05-13 DIAGNOSIS — Z79.811 USE OF ANASTROZOLE (ARIMIDEX): ICD-10-CM

## 2021-05-13 DIAGNOSIS — M85.80 OSTEOPENIA, UNSPECIFIED LOCATION: ICD-10-CM

## 2021-05-13 DIAGNOSIS — Z17.0 MALIGNANT NEOPLASM OF OVERLAPPING SITES OF LEFT BREAST IN FEMALE, ESTROGEN RECEPTOR POSITIVE (HCC): ICD-10-CM

## 2021-05-13 DIAGNOSIS — Z12.4 ENCOUNTER FOR PAPANICOLAOU SMEAR FOR CERVICAL CANCER SCREENING: ICD-10-CM

## 2021-05-13 DIAGNOSIS — C50.812 MALIGNANT NEOPLASM OF OVERLAPPING SITES OF LEFT BREAST IN FEMALE, ESTROGEN RECEPTOR POSITIVE (HCC): ICD-10-CM

## 2021-05-13 PROCEDURE — G0101 CA SCREEN;PELVIC/BREAST EXAM: HCPCS | Performed by: OBSTETRICS & GYNECOLOGY

## 2021-05-13 PROCEDURE — G0145 SCR C/V CYTO,THINLAYER,RESCR: HCPCS | Performed by: OBSTETRICS & GYNECOLOGY

## 2021-05-13 NOTE — PROGRESS NOTES
Assessment/Plan:       Diagnoses and all orders for this visit:    Menopause  -     DXA bone density spine hip and pelvis; Future    Osteopenia, unspecified location  -     DXA bone density spine hip and pelvis; Future    Encounter for screening for osteoporosis  -     DXA bone density spine hip and pelvis; Future    Malignant neoplasm of overlapping sites of left breast in female, estrogen receptor positive ([facility] )    Use of anastrozole (Arimidex)    Encounter for gynecological examination with abnormal finding        Subjective:      Patient ID: Saintclair Skeen is a 77 y o  female  HPI   patient presents for gyn exam   She has a history of stage II B estrogen/progesterone receptor positive HER2 equivocal, diagnosed 2014  She is status post left mastectomy  Presently on Arimidex  She denies any vaginal irritation, burning, discharge or bleeding  Denies any dysuria, hematuria urgency or urinary incontinence  No GI complaints  Colon cancer screening via colonoscopy 2019 normal repeat in 10 years     DEXA scan May 2019: Osteopenia    The following portions of the patient's history were reviewed and updated as appropriate:   She  has a past medical history of Abnormal findings on diagnostic imaging of breast, 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 ([facility] ), Disease of thyroid gland, Fibroadenoma of right breast, radiation therapy, Hypertension, Hypokalemia, Long term use of drug, and Overweight (3/26/2020)    She   Patient Active Problem List    Diagnosis Date Noted    Aspirin long-term use 03/26/2020    Osteopenia 12/23/2019    Diverticulosis 07/29/2019    Occult blood in stools 06/21/2019    Myalgia 11/30/2018    Chronic pain of both knees 05/23/2018    Malignant neoplasm of overlapping sites of left breast in female, estrogen receptor positive ([facility] )     Use of anastrozole (Arimidex)     Menopause 05/02/2017    Hyperglycemia 03/24/2014    Hyperlipidemia 2012    Hypertension 2012    Hypothyroidism 2012     She  has a past surgical history that includes [place] lymph node biopsy (Left, 2014); Breast biopsy (Left, 2014); Breast biopsy (Right, 2015); Portacath placement (2014); Tonsillectomy (1956);  section; Tubal ligation; Lymphadenectomy (Left, 2014); IVC filter retrieval (2014); and Mastectomy (Left, 2014)  Her family history includes Breast cancer additional onset in her sister; Coronary artery disease in her mother; Diabetes in her mother; Heart failure in her mother  She  reports that she has never smoked  She has never used smokeless tobacco  She reports current alcohol use of about 2 0 - 3 0 standard drinks of alcohol per week  She reports that she does not use drugs  Current Outpatient Medications   Medication Sig Dispense Refill    anastrozole (ARIMIDEX) 1 mg tablet TAKE 1 TABLET BY MOUTH EVERY DAY 90 tablet 1    aspirin (ECOTRIN LOW STRENGTH) 81 mg EC tablet Take 81 mg by mouth daily      Calcium Carbonate-Vitamin D (CALCIUM 600+D) 600-400 MG-UNIT per tablet Take 2 tablets by mouth daily      celecoxib (CeleBREX) 200 mg capsule Take 1 capsule daily with food for arm pain 15 capsule 1    hydrochlorothiazide (MICROZIDE) 12 5 mg capsule TAKE 1 CAPSULE BY MOUTH EVERY DAY 90 capsule 3    levothyroxine 25 mcg tablet TAKE 1 TABLET BY MOUTH EVERY DAY 90 tablet 3    Multiple Vitamin (MULTIVITAMINS PO) Take by mouth      pravastatin (PRAVACHOL) 40 mg tablet TAKE 1 TABLET BY MOUTH EVERY DAY 90 tablet 3    predniSONE 5 mg tablet Take 1 tablet (5 mg total) by mouth daily 14 tablet 0     No current facility-administered medications for this visit        Current Outpatient Medications on File Prior to Visit   Medication Sig    anastrozole (ARIMIDEX) 1 mg tablet TAKE 1 TABLET BY MOUTH EVERY DAY    aspirin (ECOTRIN LOW STRENGTH) 81 mg EC tablet Take 81 mg by mouth daily    Calcium Carbonate-Vitamin D (CALCIUM 600+D) 600-400 MG-UNIT per tablet Take 2 tablets by mouth daily    celecoxib (CeleBREX) 200 mg capsule Take 1 capsule daily with food for arm pain    hydrochlorothiazide (MICROZIDE) 12 5 mg capsule TAKE 1 CAPSULE BY MOUTH EVERY DAY    levothyroxine 25 mcg tablet TAKE 1 TABLET BY MOUTH EVERY DAY    Multiple Vitamin (MULTIVITAMINS PO) Take by mouth    pravastatin (PRAVACHOL) 40 mg tablet TAKE 1 TABLET BY MOUTH EVERY DAY    predniSONE 5 mg tablet Take 1 tablet (5 mg total) by mouth daily     No current facility-administered medications on file prior to visit  She has No Known Allergies       Review of Systems   Constitutional: Negative  HENT: Negative for sore throat and trouble swallowing  Gastrointestinal: Negative  Genitourinary: Negative  Objective:      /80   Ht 5' 3 75" (1 619 m)   Wt 68 1 kg (150 lb 3 2 oz)   BMI 25 98 kg/m²          Physical Exam  Vitals signs reviewed  Constitutional:       Appearance: Normal appearance  She is normal weight  Neck:      Musculoskeletal: Normal range of motion and neck supple  No muscular tenderness  Cardiovascular:      Rate and Rhythm: Normal rate and regular rhythm  Pulses: Normal pulses  Heart sounds: Normal heart sounds  Pulmonary:      Effort: Pulmonary effort is normal  No respiratory distress  Breath sounds: Normal breath sounds  Chest:      Breasts:         Right: No swelling, bleeding, inverted nipple, mass, nipple discharge, skin change or tenderness  Comments: S/p Lt mastectomy  Abdominal:      General: Abdomen is flat  There is no distension  Palpations: Abdomen is soft  There is no mass  Tenderness: There is no abdominal tenderness  There is no guarding or rebound  Hernia: No hernia is present  There is no hernia in the left inguinal area or right inguinal area  Genitourinary:     General: Normal vulva        Labia:         Right: No rash, tenderness or lesion  Left: No rash, tenderness or lesion  Comments: Uterus anteverted, small, nontender  Cervix normal   No palpable adnexal masses or tenderness  Vagina with atrophic changes  Bartholin's and Cherokee's glands normal   Urethral orifice normal   No cystocele or rectocele present  Lymphadenopathy:      Cervical: No cervical adenopathy  Upper Body:      Right upper body: No supraclavicular, axillary or pectoral adenopathy  Left upper body: No supraclavicular, axillary or pectoral adenopathy  Lower Body: No right inguinal adenopathy  No left inguinal adenopathy  Neurological:      Mental Status: She is alert

## 2021-05-17 ENCOUNTER — APPOINTMENT (OUTPATIENT)
Dept: LAB | Facility: CLINIC | Age: 76
End: 2021-05-17
Payer: MEDICARE

## 2021-05-17 DIAGNOSIS — E03.9 ACQUIRED HYPOTHYROIDISM: ICD-10-CM

## 2021-05-17 DIAGNOSIS — I10 ESSENTIAL HYPERTENSION: ICD-10-CM

## 2021-05-17 DIAGNOSIS — D75.839 THROMBOCYTHEMIA: ICD-10-CM

## 2021-05-17 DIAGNOSIS — R79.89 POSITIVE D-DIMER: ICD-10-CM

## 2021-05-17 LAB
BASOPHILS # BLD AUTO: 0.04 THOUSANDS/ΜL (ref 0–0.1)
BASOPHILS NFR BLD AUTO: 0 % (ref 0–1)
EOSINOPHIL # BLD AUTO: 0.07 THOUSAND/ΜL (ref 0–0.61)
EOSINOPHIL NFR BLD AUTO: 1 % (ref 0–6)
ERYTHROCYTE [DISTWIDTH] IN BLOOD BY AUTOMATED COUNT: 13.7 % (ref 11.6–15.1)
HCT VFR BLD AUTO: 43.5 % (ref 34.8–46.1)
HGB BLD-MCNC: 14 G/DL (ref 11.5–15.4)
IMM GRANULOCYTES # BLD AUTO: 0.05 THOUSAND/UL (ref 0–0.2)
IMM GRANULOCYTES NFR BLD AUTO: 1 % (ref 0–2)
LYMPHOCYTES # BLD AUTO: 0.88 THOUSANDS/ΜL (ref 0.6–4.47)
LYMPHOCYTES NFR BLD AUTO: 8 % (ref 14–44)
MCH RBC QN AUTO: 29.8 PG (ref 26.8–34.3)
MCHC RBC AUTO-ENTMCNC: 32.2 G/DL (ref 31.4–37.4)
MCV RBC AUTO: 93 FL (ref 82–98)
MONOCYTES # BLD AUTO: 0.59 THOUSAND/ΜL (ref 0.17–1.22)
MONOCYTES NFR BLD AUTO: 6 % (ref 4–12)
NEUTROPHILS # BLD AUTO: 8.99 THOUSANDS/ΜL (ref 1.85–7.62)
NEUTS SEG NFR BLD AUTO: 84 % (ref 43–75)
NRBC BLD AUTO-RTO: 0 /100 WBCS
PLATELET # BLD AUTO: 325 THOUSANDS/UL (ref 149–390)
PMV BLD AUTO: 9.7 FL (ref 8.9–12.7)
RBC # BLD AUTO: 4.7 MILLION/UL (ref 3.81–5.12)
TSH SERPL DL<=0.05 MIU/L-ACNC: 2.39 UIU/ML (ref 0.36–3.74)
WBC # BLD AUTO: 10.62 THOUSAND/UL (ref 4.31–10.16)

## 2021-05-17 PROCEDURE — 85379 FIBRIN DEGRADATION QUANT: CPT

## 2021-05-17 PROCEDURE — 85025 COMPLETE CBC W/AUTO DIFF WBC: CPT

## 2021-05-17 PROCEDURE — 84443 ASSAY THYROID STIM HORMONE: CPT

## 2021-05-17 PROCEDURE — 36415 COLL VENOUS BLD VENIPUNCTURE: CPT

## 2021-05-18 DIAGNOSIS — Z13.820 ENCOUNTER FOR SCREENING FOR OSTEOPOROSIS: ICD-10-CM

## 2021-05-18 DIAGNOSIS — Z78.0 MENOPAUSE: ICD-10-CM

## 2021-05-18 DIAGNOSIS — M85.80 OSTEOPENIA, UNSPECIFIED LOCATION: ICD-10-CM

## 2021-05-18 LAB — D DIMER PPP FEU-MCNC: 0.77 UG/ML FEU

## 2021-05-19 LAB
LAB AP GYN PRIMARY INTERPRETATION: NORMAL
Lab: NORMAL

## 2021-06-07 ENCOUNTER — HOSPITAL ENCOUNTER (OUTPATIENT)
Dept: MAMMOGRAPHY | Facility: MEDICAL CENTER | Age: 76
Discharge: HOME | End: 2021-06-07
Payer: MEDICARE

## 2021-06-07 VITALS — BODY MASS INDEX: 25.61 KG/M2 | HEIGHT: 64 IN | WEIGHT: 150 LBS

## 2021-06-07 DIAGNOSIS — Z12.39 BREAST CANCER SCREENING, HIGH RISK PATIENT: ICD-10-CM

## 2021-06-07 PROCEDURE — 77063 BREAST TOMOSYNTHESIS BI: CPT

## 2021-06-07 PROCEDURE — 77067 SCR MAMMO BI INCL CAD: CPT

## 2021-06-21 DIAGNOSIS — C50.919 MALIGNANT NEOPLASM OF BREAST IN FEMALE, ESTROGEN RECEPTOR POSITIVE, UNSPECIFIED LATERALITY, UNSPECIFIED SITE OF BREAST (HCC): ICD-10-CM

## 2021-06-21 DIAGNOSIS — Z17.0 MALIGNANT NEOPLASM OF BREAST IN FEMALE, ESTROGEN RECEPTOR POSITIVE, UNSPECIFIED LATERALITY, UNSPECIFIED SITE OF BREAST (HCC): ICD-10-CM

## 2021-06-21 RX ORDER — ANASTROZOLE 1 MG/1
TABLET ORAL
Qty: 90 TABLET | Refills: 1 | Status: SHIPPED | OUTPATIENT
Start: 2021-06-21 | End: 2022-01-31

## 2021-07-20 ENCOUNTER — APPOINTMENT (OUTPATIENT)
Dept: LAB | Facility: CLINIC | Age: 76
End: 2021-07-20
Payer: MEDICARE

## 2021-07-20 DIAGNOSIS — I10 ESSENTIAL HYPERTENSION: ICD-10-CM

## 2021-07-20 DIAGNOSIS — E03.9 ACQUIRED HYPOTHYROIDISM: ICD-10-CM

## 2021-07-20 DIAGNOSIS — E78.2 MIXED HYPERLIPIDEMIA: ICD-10-CM

## 2021-07-20 LAB
ALBUMIN SERPL BCP-MCNC: 3.4 G/DL (ref 3.5–5)
ALP SERPL-CCNC: 63 U/L (ref 46–116)
ALT SERPL W P-5'-P-CCNC: 23 U/L (ref 12–78)
ANION GAP SERPL CALCULATED.3IONS-SCNC: 7 MMOL/L (ref 4–13)
AST SERPL W P-5'-P-CCNC: 20 U/L (ref 5–45)
BILIRUB SERPL-MCNC: 0.7 MG/DL (ref 0.2–1)
BUN SERPL-MCNC: 10 MG/DL (ref 5–25)
CALCIUM ALBUM COR SERPL-MCNC: 9.7 MG/DL (ref 8.3–10.1)
CALCIUM SERPL-MCNC: 9.2 MG/DL (ref 8.3–10.1)
CHLORIDE SERPL-SCNC: 103 MMOL/L (ref 100–108)
CHOLEST SERPL-MCNC: 141 MG/DL (ref 50–200)
CO2 SERPL-SCNC: 27 MMOL/L (ref 21–32)
CREAT SERPL-MCNC: 0.49 MG/DL (ref 0.6–1.3)
GFR SERPL CREATININE-BSD FRML MDRD: 96 ML/MIN/1.73SQ M
GLUCOSE P FAST SERPL-MCNC: 90 MG/DL (ref 65–99)
HDLC SERPL-MCNC: 65 MG/DL
LDLC SERPL CALC-MCNC: 56 MG/DL (ref 0–100)
NONHDLC SERPL-MCNC: 76 MG/DL
POTASSIUM SERPL-SCNC: 3.8 MMOL/L (ref 3.5–5.3)
PROT SERPL-MCNC: 7.2 G/DL (ref 6.4–8.2)
SODIUM SERPL-SCNC: 137 MMOL/L (ref 136–145)
TRIGL SERPL-MCNC: 100 MG/DL
TSH SERPL DL<=0.05 MIU/L-ACNC: 4.73 UIU/ML (ref 0.36–3.74)

## 2021-07-20 PROCEDURE — 80061 LIPID PANEL: CPT

## 2021-07-20 PROCEDURE — 84443 ASSAY THYROID STIM HORMONE: CPT

## 2021-07-20 PROCEDURE — 36415 COLL VENOUS BLD VENIPUNCTURE: CPT

## 2021-07-20 PROCEDURE — 80053 COMPREHEN METABOLIC PANEL: CPT

## 2021-07-26 ENCOUNTER — OFFICE VISIT (OUTPATIENT)
Dept: SURGICAL ONCOLOGY | Facility: CLINIC | Age: 76
End: 2021-07-26
Payer: MEDICARE

## 2021-07-26 VITALS
BODY MASS INDEX: 25.4 KG/M2 | HEIGHT: 64 IN | TEMPERATURE: 98.5 F | OXYGEN SATURATION: 98 % | DIASTOLIC BLOOD PRESSURE: 76 MMHG | RESPIRATION RATE: 16 BRPM | WEIGHT: 148.8 LBS | HEART RATE: 73 BPM | SYSTOLIC BLOOD PRESSURE: 128 MMHG

## 2021-07-26 DIAGNOSIS — Z12.39 BREAST CANCER SCREENING, HIGH RISK PATIENT: ICD-10-CM

## 2021-07-26 DIAGNOSIS — Z12.31 ENCOUNTER FOR SCREENING MAMMOGRAM FOR MALIGNANT NEOPLASM OF BREAST: ICD-10-CM

## 2021-07-26 DIAGNOSIS — C50.812 MALIGNANT NEOPLASM OF OVERLAPPING SITES OF LEFT BREAST IN FEMALE, ESTROGEN RECEPTOR POSITIVE (HCC): Primary | ICD-10-CM

## 2021-07-26 DIAGNOSIS — Z79.811 USE OF ANASTROZOLE (ARIMIDEX): ICD-10-CM

## 2021-07-26 DIAGNOSIS — Z17.0 MALIGNANT NEOPLASM OF OVERLAPPING SITES OF LEFT BREAST IN FEMALE, ESTROGEN RECEPTOR POSITIVE (HCC): Primary | ICD-10-CM

## 2021-07-26 PROCEDURE — 99214 OFFICE O/P EST MOD 30 MIN: CPT | Performed by: SURGERY

## 2021-07-26 NOTE — PROGRESS NOTES
Surgical Oncology Follow Up       [facility] SURGICAL ONCOLOGY [facility] [address] [id number]    Seven Arana  1945  [id number]  [facility] SURGICAL ONCOLOGY [facility] [address] [id number]    Chief Complaint   Patient presents with    Follow-up       Assessment/Plan   Diagnoses and all orders for this visit:    Malignant neoplasm of overlapping sites of left breast in female, estrogen receptor positive ([facility] )    Use of anastrozole (Arimidex)    Breast cancer screening, high risk patient  -     Mammo screening right w 3d & cad; Future    Encounter for screening mammogram for malignant neoplasm of breast   -     Mammo screening right w 3d & cad; Future        Advance Care Planning/Advance Directives:  Discussed disease status, cancer treatment plans and/or cancer treatment goals with the patient  Oncology History:    Oncology History Overview Note         Malignant neoplasm of overlapping sites of left breast in female, estrogen receptor positive ([facility] )    Initial Diagnosis    Malignant neoplasm of left breast in female, estrogen receptor positive ([facility] )     3/19/2014 Surgery    Left breast US guided core biopsy,  [facility]      4/14/2014 Surgery    Left breast mastectomy, SL[facility], AND     10/29/2014 - 12/8/2014 Radiation    Mastectomy scar/left chest wall and the left supraclavicular fossa: 50 4Gy in 28 daily 180cGy fractions  Dr Licha Arnold  2014 -  Chemotherapy    05/2014  Dose dense AC X 4 cycles  07/2014  Paclitaxel X 12  Herceptin  Dr Saniya Deng  Hormone Therapy    Anastrazole  Dr Saniya Deng         History of Present Illness:  Breast cancer follow-up, continues on anastrozole, no concerns  -Interval History: recent mammogram    Review of Systems:  Review of Systems   Constitutional: Negative for appetite change and fever  Hot flashes from the anastrozole   Eyes: Negative      Respiratory: Negative for shortness of breath  Cardiovascular: Negative  Gastrointestinal: Negative  Endocrine: Negative  Genitourinary: Negative  Musculoskeletal: Negative  Negative for arthralgias and myalgias  Skin: Negative  Allergic/Immunologic: Negative  Neurological: Negative  Hematological: Negative  Negative for adenopathy  Does not bruise/bleed easily  Psychiatric/Behavioral: Negative          Patient Active Problem List   Diagnosis    [facility] of left [facility], [facility]    Use of anastrozole (Arimidex)    Hyperglycemia    Hyperlipidemia    Hypertension    Hypothyroidism    Menopause    Chronic pain of both knees    Myalgia    Breast cancer screening, high risk patient    Occult blood in stools    Diverticulosis    Osteopenia    Aspirin long-term use     Past Medical History:   Diagnosis Date    Abnormal findings on diagnostic imaging of breast     last assessed 3/19/14    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 ([facility] )     last assessed 10/2/15    Disease of thyroid gland     Fibroadenoma of right breast     History of chemotherapy     Hx of radiation therapy     last assessed 17    Hypertension     Hypokalemia     last assessed 4/15/16    Long term use of drug     herceptin,last assessed 17    Overweight 3/26/2020     Past Surgical History:   Procedure Laterality Date    BREAST BIOPSY Left 2014    [facility]    BREAST BIOPSY Right 2015    FIBROADENOMA     SECTION      X2    IVC FILTER RETRIEVAL  2014    central iv line with subcutaneous reservoir mediaport    LYMPHADENECTOMY Left 2014    axxillary    MASTECTOMY Left 2014    PORTACATH PLACEMENT  2014    SENTINEL LYMPH NODE BIOPSY Left 2014    AND    TONSILLECTOMY      TUBAL LIGATION       Family History   Problem Relation Age of Onset    Heart failure Mother  Coronary artery disease Mother     Diabetes Mother     Breast cancer Sister 76    BRCA1 Negative Sister      Social History     Socioeconomic History    Marital status: /Civil Union     Spouse name: Not on file    Number of children: Not on file    Years of education: Not on file    Highest education level: Not on file   Occupational History    Occupation: retired   Tobacco Use    Smoking status: Never Smoker    Smokeless tobacco: Never Used    Tobacco comment: no secondhand smoe exposure   Vaping Use    Vaping Use: Never used   Substance and Sexual Activity    Alcohol use: Yes     Alcohol/week: 2 0 - 3 0 standard drinks     Types: 2 - 3 Glasses of wine per week     Comment: social    Drug use: No    Sexual activity: Not Currently     Birth control/protection: Post-menopausal   Other Topics Concern    Not on file   Social History Narrative    Exercises daily     Social Determinants of Health     Financial Resource Strain:     Difficulty of Paying Living Expenses:    Food Insecurity:     Worried About Running Out of Food in the Last Year:     Ran Out of Food in the Last Year:    Transportation Needs:     Lack of Transportation (Medical):      Lack of Transportation (Non-Medical):    Physical Activity:     Days of Exercise per Week:     Minutes of Exercise per Session:    Stress:     Feeling of Stress :    Social Connections:     Frequency of Communication with Friends and Family:     Frequency of Social Gatherings with Friends and Family:     Attends Orthodoxy Services:     Active Member of Clubs or Organizations:     Attends Club or Organization Meetings:     Marital Status:    Intimate Partner Violence:     Fear of Current or Ex-Partner:     Emotionally Abused:     Physically Abused:     Sexually Abused:        Current Outpatient Medications:     anastrozole (ARIMIDEX) 1 mg tablet, TAKE 1 TABLET BY MOUTH EVERY DAY, Disp: 90 tablet, Rfl: 1    aspirin (ECOTRIN LOW STRENGTH) 81 mg EC tablet, Take 81 mg by mouth daily, Disp: , Rfl:     Calcium Carbonate-Vitamin D (CALCIUM 600+D) 600-400 MG-UNIT per tablet, Take 2 tablets by mouth daily, Disp: , Rfl:     hydrochlorothiazide (MICROZIDE) 12 5 mg capsule, TAKE 1 CAPSULE BY MOUTH EVERY DAY, Disp: 90 capsule, Rfl: 3    levothyroxine 25 mcg tablet, TAKE 1 TABLET BY MOUTH EVERY DAY, Disp: 90 tablet, Rfl: 3    Multiple Vitamin (MULTIVITAMINS PO), Take by mouth, Disp: , Rfl:     pravastatin (PRAVACHOL) 40 mg tablet, TAKE 1 TABLET BY MOUTH EVERY DAY, Disp: 90 tablet, Rfl: 3    celecoxib (CeleBREX) 200 mg capsule, Take 1 capsule daily with food for arm pain (Patient not taking: Reported on 7/26/2021), Disp: 15 capsule, Rfl: 1    predniSONE 5 mg tablet, Take 1 tablet (5 mg total) by mouth daily (Patient not taking: Reported on 7/26/2021), Disp: 14 tablet, Rfl: 0  No Known Allergies    The following portions of the patient's history were reviewed and updated as appropriate: allergies, current medications, past family history, past medical history, past social history, past surgical history and problem list         Vitals:    07/26/21 1008   BP: 128/76   Pulse: 73   Resp: 16   Temp: 98 5 °F (36 9 °C)   SpO2: 98%       Physical Exam  Constitutional:       General: She is not in acute distress  Appearance: Normal appearance  She is well-developed  HENT:      Head: Normocephalic and atraumatic  Cardiovascular:      Heart sounds: Normal heart sounds  Pulmonary:      Breath sounds: Normal breath sounds  Chest:      Breasts:         Right: No inverted nipple, mass, nipple discharge, skin change or tenderness  Left: Skin change (  Mastectomy scar) present  No mass or tenderness  Abdominal:      Palpations: Abdomen is soft  Lymphadenopathy:      Upper Body:      Right upper body: No supraclavicular, axillary or pectoral adenopathy  Left upper body: No supraclavicular, axillary or pectoral adenopathy  Neurological:      Mental Status: She is alert and oriented to person, place, and time  Psychiatric:         Mood and Affect: Mood normal            Results:  Labs:      Imaging   06/07/2021 right 3D screening mammogram is benign BI-RADS two with a density of one    I reviewed the above imaging data  Discussion/Summary: 29-year-old female status post left mastectomy for an invasive ductal carcinoma  She had adjuvant chemo, Herceptin and postmastectomy radiation  She continues on anastrozole  There is no evidence of disease based on examination today  Her recent contralateral mammogram was benign  I will continue to see her on an annual basis following her mammogram or sooner should the need arise

## 2021-08-07 DIAGNOSIS — E03.9 ACQUIRED HYPOTHYROIDISM: ICD-10-CM

## 2021-08-07 DIAGNOSIS — I10 ESSENTIAL HYPERTENSION: ICD-10-CM

## 2021-08-07 DIAGNOSIS — E78.2 MIXED HYPERLIPIDEMIA: ICD-10-CM

## 2021-08-08 RX ORDER — HYDROCHLOROTHIAZIDE 12.5 MG/1
CAPSULE, GELATIN COATED ORAL
Qty: 90 CAPSULE | Refills: 3 | Status: SHIPPED | OUTPATIENT
Start: 2021-08-08 | End: 2022-07-26

## 2021-08-08 RX ORDER — PRAVASTATIN SODIUM 40 MG
TABLET ORAL
Qty: 90 TABLET | Refills: 3 | Status: SHIPPED | OUTPATIENT
Start: 2021-08-08 | End: 2022-07-26

## 2021-08-08 RX ORDER — LEVOTHYROXINE SODIUM 0.03 MG/1
TABLET ORAL
Qty: 90 TABLET | Refills: 3 | Status: SHIPPED | OUTPATIENT
Start: 2021-08-08 | End: 2021-08-09 | Stop reason: SDUPTHER

## 2021-08-09 ENCOUNTER — OFFICE VISIT (OUTPATIENT)
Dept: FAMILY MEDICINE CLINIC | Facility: CLINIC | Age: 76
End: 2021-08-09
Payer: MEDICARE

## 2021-08-09 VITALS
HEART RATE: 72 BPM | DIASTOLIC BLOOD PRESSURE: 72 MMHG | RESPIRATION RATE: 16 BRPM | WEIGHT: 148.6 LBS | SYSTOLIC BLOOD PRESSURE: 128 MMHG | HEIGHT: 64 IN | BODY MASS INDEX: 25.37 KG/M2

## 2021-08-09 DIAGNOSIS — C50.812 MALIGNANT NEOPLASM OF OVERLAPPING SITES OF LEFT BREAST IN FEMALE, ESTROGEN RECEPTOR POSITIVE (HCC): ICD-10-CM

## 2021-08-09 DIAGNOSIS — R73.9 HYPERGLYCEMIA: ICD-10-CM

## 2021-08-09 DIAGNOSIS — Z17.0 MALIGNANT NEOPLASM OF OVERLAPPING SITES OF LEFT BREAST IN FEMALE, ESTROGEN RECEPTOR POSITIVE (HCC): ICD-10-CM

## 2021-08-09 DIAGNOSIS — E78.2 MIXED HYPERLIPIDEMIA: ICD-10-CM

## 2021-08-09 DIAGNOSIS — E03.9 ACQUIRED HYPOTHYROIDISM: ICD-10-CM

## 2021-08-09 DIAGNOSIS — M85.88 OSTEOPENIA OF LUMBAR SPINE: ICD-10-CM

## 2021-08-09 DIAGNOSIS — I10 ESSENTIAL HYPERTENSION: Primary | ICD-10-CM

## 2021-08-09 PROCEDURE — 1123F ACP DISCUSS/DSCN MKR DOCD: CPT | Performed by: FAMILY MEDICINE

## 2021-08-09 PROCEDURE — 99214 OFFICE O/P EST MOD 30 MIN: CPT | Performed by: FAMILY MEDICINE

## 2021-08-09 PROCEDURE — G0439 PPPS, SUBSEQ VISIT: HCPCS | Performed by: FAMILY MEDICINE

## 2021-08-09 RX ORDER — LEVOTHYROXINE SODIUM 0.05 MG/1
50 TABLET ORAL DAILY
Qty: 90 TABLET | Refills: 3 | Status: SHIPPED | OUTPATIENT
Start: 2021-08-09 | End: 2022-07-26

## 2021-08-09 NOTE — ASSESSMENT & PLAN NOTE
Cholesterol excellent with pravastatin 40  As she is tolerating the pravastatin, would not make any adjustments

## 2021-08-09 NOTE — ASSESSMENT & PLAN NOTE
DEXA scan in May of 2021 showed that she had normal bone density, however the femoral neck was still showing some decline in density  FRAX score not reported  Continue with calcium and vitamin-D supplementation

## 2021-08-09 NOTE — PROGRESS NOTES
Assessment and Plan:     Problem List Items Addressed This Visit     None           Preventive health issues were discussed with patient, and age appropriate screening tests were ordered as noted in patient's After Visit Summary  Personalized health advice and appropriate referrals for health education or preventive services given if needed, as noted in patient's After Visit Summary       History of Present Illness:     Patient presents for Medicare Annual Wellness visit    Patient Care Team:  Marco Hairston MD as PCP - General  MD Panda Cortes Trixie Sea, MD Dyann Kim, MD Gloris Gallop, MD (Radiation Oncology)     Problem List:     Patient Active Problem List   Diagnosis    Malignant neoplasm of overlapping sites of left breast in female, estrogen receptor positive ([facility] )    Use of anastrozole (Arimidex)    Hyperglycemia    Hyperlipidemia    Hypertension    Hypothyroidism    Menopause    Chronic pain of both knees    Myalgia    Breast cancer screening, high risk patient    Occult blood in stools    Diverticulosis    Osteopenia    Aspirin long-term use      Past Medical and Surgical History:     Past Medical History:   Diagnosis Date    Abnormal findings on diagnostic imaging of breast     last assessed 3/19/14    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 ([facility] )     last assessed 10/2/15    Disease of thyroid gland     Fibroadenoma of right breast     History of chemotherapy     Hx of radiation therapy     last assessed 17    Hypertension     Hypokalemia     last assessed 4/15/16    Long term use of drug     herceptin,last assessed 17    Overweight 3/26/2020     Past Surgical History:   Procedure Laterality Date    BREAST BIOPSY Left 2014    [facility]    BREAST BIOPSY Right 2015    FIBROADENOMA     SECTION      X2    IVC FILTER RETRIEVAL  2014    central iv line with subcutaneous reservoir Status:    Intimate Partner Violence:     Fear of Current or Ex-Partner:     Emotionally Abused:     Physically Abused:     Sexually Abused:       Medications and Allergies:     Current Outpatient Medications   Medication Sig Dispense Refill    anastrozole (ARIMIDEX) 1 mg tablet TAKE 1 TABLET BY MOUTH EVERY DAY 90 tablet 1    aspirin (ECOTRIN LOW STRENGTH) 81 mg EC tablet Take 81 mg by mouth daily      Calcium Carbonate-Vitamin D (CALCIUM 600+D) 600-400 MG-UNIT per tablet Take 2 tablets by mouth daily      hydrochlorothiazide (MICROZIDE) 12 5 mg capsule TAKE 1 CAPSULE BY MOUTH EVERY DAY 90 capsule 3    levothyroxine 25 mcg tablet TAKE 1 TABLET BY MOUTH EVERY DAY 90 tablet 3    Multiple Vitamin (MULTIVITAMINS PO) Take by mouth      pravastatin (PRAVACHOL) 40 mg tablet TAKE 1 TABLET BY MOUTH EVERY DAY 90 tablet 3     No current facility-administered medications for this visit  No Known Allergies   Immunizations:     Immunization History   Administered Date(s) Administered    H1N1, All Formulations 01/09/2010    Influenza Split High Dose Preservative Free IM 10/02/2015, 10/14/2016, 11/15/2017    Influenza, high dose seasonal 0 7 mL 10/26/2018, 11/07/2019, 10/10/2020    Influenza, seasonal, injectable 1945, 11/02/2014    Pneumococcal Conjugate 13-Valent 10/14/2016    Pneumococcal Polysaccharide PPV23 10/27/2011    SARS-CoV-2 / COVID-19 mRNA IM (Moderna) 02/19/2021, 03/26/2021    Tdap 1945    Zoster 10/02/2013      Health Maintenance:         Topic Date Due    Hepatitis C Screening  04/26/2022 (Originally 1945)    Breast Cancer Screening: Mammogram  06/07/2022    Cervical Cancer Screening  05/13/2023    DXA SCAN  05/13/2026    Colorectal Cancer Screening  07/29/2029         Topic Date Due    Influenza Vaccine (1) 09/01/2021      Medicare Health Risk Assessment:     There were no vitals taken for this visit  Beatris Lance is here for her Subsequent Wellness visit       Health Risk Assessment:   Patient rates overall health as good  Patient feels that their physical health rating is same  Patient is satisfied with their life  Eyesight was rated as same  Hearing was rated as same  Patient feels that their emotional and mental health rating is same  Patients states they are never, rarely angry  Patient states they are sometimes unusually tired/fatigued  Pain experienced in the last 7 days has been none  Patient states that she has experienced no weight loss or gain in last 6 months  Depression Screening:   PHQ-2 Score: 0      Fall Risk Screening: In the past year, patient has experienced: no history of falling in past year      Urinary Incontinence Screening:   Patient has not leaked urine accidently in the last six months  Home Safety:  Patient does not have trouble with stairs inside or outside of their home  Patient has no working smoke alarms and has working carbon monoxide detector  Home safety hazards include: none  Nutrition:   Current diet is Regular  Medications:   Patient is currently taking over-the-counter supplements  OTC medications include: see medication list  Patient is able to manage medications  Activities of Daily Living (ADLs)/Instrumental Activities of Daily Living (IADLs):   Walk and transfer into and out of bed and chair?: Yes  Dress and groom yourself?: Yes    Bathe or shower yourself?: Yes    Feed yourself? Yes  Do your laundry/housekeeping?: Yes  Manage your money, pay your bills and track your expenses?: Yes  Make your own meals?: Yes    Do your own shopping?: Yes    Previous Hospitalizations:   Any hospitalizations or ED visits within the last 12 months?: No      Advance Care Planning:   Living will: Yes    Durable POA for healthcare:  Yes    Advanced directive: Yes    Advanced directive counseling given: Yes    Five wishes given: No    Patient declined ACP directive: No      Cognitive Screening:   Provider or family/friend/caregiver concerned regarding cognition?: No    PREVENTIVE SCREENINGS      Cardiovascular Screening:    General: Screening Not Indicated and History Lipid Disorder      Diabetes Screening:     General: Screening Current      Colorectal Cancer Screening:     General: Screening Current      Breast Cancer Screening:     General: History Breast Cancer      Cervical Cancer Screening:    General: Screening Not Indicated      Osteoporosis Screening:    General: Screening Current      Abdominal Aortic Aneurysm (AAA) Screening:        General: Screening Not Indicated      Lung Cancer Screening:     General: Screening Not Indicated      Hepatitis C Screening:    General: Screening Current    Screening, Brief Intervention, and Referral to Treatment (SBIRT)    Screening  Typical number of drinks in a day: 1  Typical number of drinks in a week: 7  Interpretation: Low risk drinking behavior      Single Item Drug Screening:  How often have you used an illegal drug (including marijuana) or a prescription medication for non-medical reasons in the past year? never    Single Item Drug Screen Score: 0  Interpretation: Negative screen for possible drug use disorder      Jerome Chavez MD

## 2021-08-09 NOTE — PROGRESS NOTES
Assessment and Plan:    Problem List Items Addressed This Visit     Hyperglycemia     Blood sugar was normal   Continue to limit carbs somewhat  Hyperlipidemia     Cholesterol excellent with pravastatin 40  As she is tolerating the pravastatin, would not make any adjustments  Relevant Orders    Comprehensive metabolic panel    Lipid panel    Hypertension - Primary     Blood pressure doing quite well today  No change  She does check at home, and usually has 575 for her systolic blood pressure  Relevant Orders    TSH, 3rd generation    Comprehensive metabolic panel    Hypothyroidism     TSH is slightly elevated again  At this point, will increase Synthroid to 50 mcg  Check in 3-6 months  Relevant Medications    levothyroxine 50 mcg tablet    Other Relevant Orders    TSH, 3rd generation    Malignant neoplasm of overlapping sites of left breast in female, estrogen receptor positive [facility])     Patient following with Hematology/Oncology  Doing quite well at this point  Osteopenia     DEXA scan in May of 2021 showed that she had normal bone density, however the femoral neck was still showing some decline in density  FRAX score not reported  Continue with calcium and vitamin-D supplementation  Diagnoses and all orders for this visit:    Essential hypertension  -     TSH, 3rd generation; Future  -     Comprehensive metabolic panel; Future    Mixed hyperlipidemia  -     Comprehensive metabolic panel; Future  -     Lipid panel; Future    Acquired hypothyroidism  -     levothyroxine 50 mcg tablet; Take 1 tablet (50 mcg total) by mouth daily  -     TSH, 3rd generation; Future    Malignant neoplasm of overlapping sites of left breast in female, estrogen receptor positive ([address] )    Osteopenia of lumbar spine    Hyperglycemia              Subjective:      Patient ID: Ion Maldonado is a 77 y o  female      CC:    Chief Complaint   Patient presents with   Rizwan Hinton Follow-up     pt here for a follow up and to review lab results  R cruz  Medicare Wellness Visit       HPI:    Patient is here to follow-up on several issues  Hypothyroid:  TSH reviewed  Has family history of hypothyroid  Currently on Synthroid 25 mcg  Hyperlipidemia:  Patient currently on pravastatin at 40 mg  Hypertension:  Using hydrochlorothiazide 12 5 mg     Breast cancer:  Currently on Arimidex  Following with Hematology/Oncology  The following portions of the patient's history were reviewed and updated as appropriate: allergies, current medications, past family history, past medical history, past social history, past surgical history and problem list       Review of Systems   Constitutional: Negative  HENT: Negative  Eyes: Negative  Respiratory: Negative  Cardiovascular: Negative  Gastrointestinal: Negative  Endocrine: Negative  Genitourinary: Negative  Musculoskeletal: Negative  Skin: Negative  Allergic/Immunologic: Negative  Neurological: Negative  Hematological: Negative  Psychiatric/Behavioral: Negative  Data to review:   The 137, potassium 3 8, calcium 9 2  Blood sugar 90  Creatinine 0 49, GFR:  96  AST 20, ALT 23  Total cholesterol 141, LDL 56, HDL 65, triglycerides 100  TSH 4 73  Objective:    Vitals:    08/09/21 0821   BP: 128/72   BP Location: Left arm   Patient Position: Sitting   Cuff Size: Large   Pulse: 72   Resp: 16   Weight: 67 4 kg (148 lb 9 6 oz)   Height: 5' 3 75" (1 619 m)        Physical Exam  Vitals and nursing note reviewed  Constitutional:       Appearance: Normal appearance  HENT:      Head: Normocephalic  Cardiovascular:      Rate and Rhythm: Normal rate and regular rhythm  Pulses: Normal pulses  Carotid pulses are 2+ on the right side and 2+ on the left side  Heart sounds: Normal heart sounds  No murmur heard  No friction rub  No gallop      Pulmonary:      Effort: Pulmonary effort is normal  No respiratory distress  Breath sounds: Normal breath sounds  No stridor  No wheezing, rhonchi or rales  Chest:      Chest wall: No tenderness  Neurological:      Mental Status: She is alert

## 2021-08-09 NOTE — ASSESSMENT & PLAN NOTE
Blood pressure doing quite well today  No change  She does check at home, and usually has 340 for her systolic blood pressure

## 2021-08-09 NOTE — ASSESSMENT & PLAN NOTE
TSH is slightly elevated again  At this point, will increase Synthroid to 50 mcg  Check in 3-6 months

## 2021-08-09 NOTE — PATIENT INSTRUCTIONS
Problem List Items Addressed This Visit     Hyperglycemia     Blood sugar was normal   Continue to limit carbs somewhat  Hyperlipidemia     Cholesterol excellent with pravastatin 40  As she is tolerating the pravastatin, would not make any adjustments  Relevant Orders    Comprehensive metabolic panel    Lipid panel    Hypertension - Primary     Blood pressure doing quite well today  No change  She does check at home, and usually has 241 for her systolic blood pressure  Relevant Orders    TSH, 3rd generation    Comprehensive metabolic panel    Hypothyroidism     TSH is slightly elevated again  At this point, will increase Synthroid to 50 mcg  Check in 3-6 months  Relevant Medications    levothyroxine 50 mcg tablet    Other Relevant Orders    TSH, 3rd generation    Malignant neoplasm of overlapping sites of left breast in female, estrogen receptor positive [facility])     Patient following with Hematology/Oncology  Doing quite well at this point  Osteopenia     DEXA scan in May of 2021 showed that she had normal bone density, however the femoral neck was still showing some decline in density  FRAX score not reported  Continue with calcium and vitamin-D supplementation  COVID 19 Instructions    Favian Elizabeth was advised to limit contact with others to essential tasks such as getting food, medications, and medical care  Proper handwashing reviewed, and Hand sanitzer when washing is not available  If the patient develops symptoms of COVID 19, the patient should call the office as soon as possible  For 3671-4460 Flu season, it is strongly recommended that Flu Vaccinations be obtained  Virtual Visits may be conducted in several ways: Rashaun: You should get a text message when the provider is ready to see you  Click on the link in the text message, and the call should start    You will need to type in your name, and allow camera and microphone access  This is HIPPA compliant, and secure  Please try to download [employer] Duo  Once you do download this on your phone, you will be prompted to add your phone number to the account  After that, he should receive a text from [employer], and use that code to verify your phone number  After that, you should be able to use Google Duo to receive and make video calls  Please download Microsoft Teams to your phone or computer  You would get an email with the meeting after scheduling with the office  You will Join the meeting, and wait there till the provider joins as well  Instructions for downloading this are available from the office  This is HIPPA compliant, and secure  We are committed to getting you vaccinated as soon as possible and will be closely following CDC and [facility]  guidelines as they are released and revised  Please refer to our COVID-19 vaccine webpage for the most up to date information on the vaccine and our distribution efforts  [employer] tn    OUR NEW LOCATION:  Starting around 28June2021, our new location and phone are:    [address], [address] [place] [address], [address]  Fax: [phone]    Lab services and OB/GYN will be at this location as well

## 2021-09-20 ENCOUNTER — OFFICE VISIT (OUTPATIENT)
Dept: HEMATOLOGY ONCOLOGY | Facility: CLINIC | Age: 76
End: 2021-09-20
Payer: MEDICARE

## 2021-09-20 VITALS
HEART RATE: 70 BPM | WEIGHT: 148.6 LBS | OXYGEN SATURATION: 97 % | RESPIRATION RATE: 18 BRPM | TEMPERATURE: 97.1 F | DIASTOLIC BLOOD PRESSURE: 66 MMHG | HEIGHT: 64 IN | SYSTOLIC BLOOD PRESSURE: 118 MMHG | BODY MASS INDEX: 25.37 KG/M2

## 2021-09-20 DIAGNOSIS — C50.812 MALIGNANT NEOPLASM OF OVERLAPPING SITES OF LEFT BREAST IN FEMALE, ESTROGEN RECEPTOR POSITIVE (HCC): Primary | ICD-10-CM

## 2021-09-20 DIAGNOSIS — Z17.0 MALIGNANT NEOPLASM OF OVERLAPPING SITES OF LEFT BREAST IN FEMALE, ESTROGEN RECEPTOR POSITIVE (HCC): Primary | ICD-10-CM

## 2021-09-20 PROCEDURE — 99214 OFFICE O/P EST MOD 30 MIN: CPT | Performed by: INTERNAL MEDICINE

## 2021-09-20 NOTE — PROGRESS NOTES
Hematology / Oncology Outpatient Follow Up Note    Rizwan Lance 77 y o  female Vaishnavi Blackburn XEF:[id number]         Date:  9/20/2021    Assessment / Plan:  A 80 year old postmenopausal woman with stage IIB left breast cancer, grade 3, ER/MD positive, HER-2 Fish equivocal disease  She underwent mastectomy with axillary lymph node dissection, without reconstruction resulting in the SUNITHA  She has 3 positive lymph nodes  Multiple HER-2 fish test were consistently showed a borderline amplification  She was treated as HER-2 positive disease  She completed adjuvant chemotherapy as well as one year course of adjuvant Herceptin, in June 2015  She is currently on adjuvant hormonal therapy with anastrozole with no side effects  Clinically, she has no evidence recurrent disease  I recommended her to continue with anastrozole for 3 more years to complete 10 years of adjuvant hormonal therapy, as we previously discussed  She is in agreement with my recommendation  I will see her again in a year for routine follow-up       Subjective:      HPI:  A 80year old postmenopausal woman, who underwent regular screening mammography, which showed an abnormality in her left breast  Therefore, she underwent ultrasound-guided biopsy in March 9, 2014, which showed invasive ductal carcinoma  Subsequently, she underwent mastectomy with axillary lymph node dissection in April 4, 2014 which showed a 3 5 cm invasive ductal carcinoma, grade 3  3/11 axillary lymph node were positive for metastatic disease  This was %, MD 10-15% positive, HER-2 2+ disease  HER-2 Fish was equivoal for the gene amplification  I requested pathology Department to set another section for the second HER-2 Fish, which is pending  She presents today to discuss adjuvant treatment options  She is otherwise healthy with past medical history including hypertension, hypothyroidism, and hypercholesterolemia  She has no complaint of pain  Her weight has been stable  She has no respiratory symptoms  She has no family history of breast cancer or ovarian cancer  Her performance status is normal             Interval History:  A 80 year old postmenopausal woman, with stage IIB left breast cancer, grade 3, ER positive, HER-2 fish, borderline disease  She had 3 positive lymph nodes, when she underwent mastectomy with lymph node dissection  We have tested her to fish on several occasion, all of which came back as a borderline disease  We decided to treat her as HER-2 positive disease  She completed adjuvant chemotherapy with a c  followed by Taxol and Herceptin  She finished adjuvant Herceptin monotherapy in June 2015 without cardiac toxicity  She is currently on adjuvant hormonal therapy with anastrozole  She came in today for routine follow-up  She has no new complaint  She denied hot flashes or musculoskeletal symptoms  She has no complaint of bone pain  She has a few lb of intentional weight loss  She does regular exercise  She denied any respiratory symptoms  Her performance status is normal       Objective:      Primary Diagnosis:     Left breast cancer  Stage IIB (pT2, N1b, M0) grade 3, ER positive, AR weakly positive, HER-2 fish, borderline disease  Diagnosed in April 2014       Cancer Staging:  Cancer Staging  No matching staging information was found for the patient         Previous Hematologic/ Oncologic Treatment:      1  Adjuvant chemotherapy with dose dense AC X4 followed by weekly paclitaxel and Herceptin x12  Completed in September 2014  2  postmastectomy radiation therapy completed in December 2014    3  adjuvant Herceptin monotherapy, completed in June 2015       Current Hematologic/ Oncologic Treatment:       1  adjuvant hormonal therapy with anastrozole since October 2014      Disease Status:      SUNITHA status post mastectomy with lymph node dissection       Test Results:     Pathology:     3 5 cm of invasive ductal carcinoma, grade 3  3/11 axillary lymph node positive for metastatic disease  % positive, MD 10-15% positive, HER-2 2+ disease  HER-2 Fish is equivocal  Second, HER-2 Fish was borderline    Stage IIB(pT2, pN1b, M0)        Radiology:     DEXA scan in May 2021 showed T-score-1 0, consistent with osteopenia  Mammography on   June 2021 was benign  BI-RADS 2       Laboratory:           Physical Exam:        General Appearance:    Alert, oriented          Eyes:    PERRL   Ears:    Normal external ear canals, both ears   Nose:   Nares normal, septum midline   Throat:   Mucosa moist  Pharynx without injection  Neck:   Supple         Lungs:     Clear to auscultation bilaterally   Chest Wall:    No tenderness or deformity    Heart:    Regular rate and rhythm         Abdomen:     Soft, non-tender, bowel sounds +, no organomegaly               Extremities:   Extremities no cyanosis or edema         Skin:   no rash or icterus  Lymph nodes:   Cervical, supraclavicular, and axillary nodes normal   Neurologic:   CNII-XII intact, normal strength, sensation and reflexes     Throughout             Breast exam:   status post left mastectomy without reconstruction  No palpable abnormality in her left chest wall  Right breast exam is negative              ROS: Review of Systems   All other systems reviewed and are negative  Imaging: No results found        Labs:   Lab Results   Component Value Date    WBC 10 62 (H) 05/17/2021    HGB 14 0 05/17/2021    HCT 43 5 05/17/2021    MCV 93 05/17/2021     05/17/2021     Lab Results   Component Value Date     09/25/2015    K 3 8 07/20/2021     07/20/2021    CO2 27 07/20/2021    ANIONGAP 9 09/25/2015    BUN 10 07/20/2021    CREATININE 0 49 (L) 07/20/2021    GLUCOSE 88 09/25/2015    GLUF 90 07/20/2021    CALCIUM 9 2 07/20/2021    CORRECTEDCA 9 7 07/20/2021    AST 20 07/20/2021    ALT 23 07/20/2021    ALKPHOS 63 07/20/2021    PROT 7 1 09/25/2015    BILITOT 0 55 09/25/2015    EGFR 96 07/20/2021 Current Medications: Reviewed  Allergies: Reviewed  PMH/FH/SH:  Reviewed      Vital Sign:    Body surface area is 1 72 meters squared      Wt Readings from Last 3 Encounters:   09/20/21 67 4 kg (148 lb 9 6 oz)   08/09/21 67 4 kg (148 lb 9 6 oz)   07/26/21 67 5 kg (148 lb 12 8 oz)        Temp Readings from Last 3 Encounters:   09/20/21 (!) 97 1 °F (36 2 °C) (Tympanic Core)   07/26/21 98 5 °F (36 9 °C) (Temporal)   04/26/21 (!) 97 4 °F (36 3 °C) (Temporal)        BP Readings from Last 3 Encounters:   09/20/21 118/66   08/09/21 128/72   07/26/21 128/76         Pulse Readings from Last 3 Encounters:   09/20/21 70   08/09/21 72   07/26/21 73     @LASTSAO2(3)@

## 2021-10-19 ENCOUNTER — CLINICAL SUPPORT (OUTPATIENT)
Dept: FAMILY MEDICINE CLINIC | Facility: CLINIC | Age: 76
End: 2021-10-19
Payer: MEDICARE

## 2021-10-19 DIAGNOSIS — Z23 ENCOUNTER FOR IMMUNIZATION: Primary | ICD-10-CM

## 2021-10-19 PROCEDURE — G0008 ADMIN INFLUENZA VIRUS VAC: HCPCS

## 2021-10-19 PROCEDURE — 90662 IIV NO PRSV INCREASED AG IM: CPT

## 2021-11-09 ENCOUNTER — LAB (OUTPATIENT)
Dept: LAB | Facility: CLINIC | Age: 76
End: 2021-11-09
Payer: MEDICARE

## 2021-11-09 DIAGNOSIS — E03.9 ACQUIRED HYPOTHYROIDISM: ICD-10-CM

## 2021-11-09 DIAGNOSIS — I10 ESSENTIAL HYPERTENSION: ICD-10-CM

## 2021-11-09 LAB — TSH SERPL DL<=0.05 MIU/L-ACNC: 1.81 UIU/ML (ref 0.36–3.74)

## 2021-11-09 PROCEDURE — 84443 ASSAY THYROID STIM HORMONE: CPT

## 2021-11-09 PROCEDURE — 36415 COLL VENOUS BLD VENIPUNCTURE: CPT

## 2022-01-30 DIAGNOSIS — Z17.0 MALIGNANT NEOPLASM OF BREAST IN FEMALE, ESTROGEN RECEPTOR POSITIVE, UNSPECIFIED LATERALITY, UNSPECIFIED SITE OF BREAST (HCC): ICD-10-CM

## 2022-01-30 DIAGNOSIS — C50.919 MALIGNANT NEOPLASM OF BREAST IN FEMALE, ESTROGEN RECEPTOR POSITIVE, UNSPECIFIED LATERALITY, UNSPECIFIED SITE OF BREAST (HCC): ICD-10-CM

## 2022-01-31 RX ORDER — ANASTROZOLE 1 MG/1
TABLET ORAL
Qty: 90 TABLET | Refills: 1 | Status: SHIPPED | OUTPATIENT
Start: 2022-01-31 | End: 2022-07-25

## 2022-02-09 ENCOUNTER — APPOINTMENT (OUTPATIENT)
Dept: LAB | Facility: CLINIC | Age: 77
End: 2022-02-09
Payer: MEDICARE

## 2022-02-09 DIAGNOSIS — E78.2 MIXED HYPERLIPIDEMIA: ICD-10-CM

## 2022-02-09 DIAGNOSIS — I10 ESSENTIAL HYPERTENSION: ICD-10-CM

## 2022-02-09 LAB
ALBUMIN SERPL BCP-MCNC: 4 G/DL (ref 3.5–5)
ALP SERPL-CCNC: 71 U/L (ref 46–116)
ALT SERPL W P-5'-P-CCNC: 22 U/L (ref 12–78)
ANION GAP SERPL CALCULATED.3IONS-SCNC: 8 MMOL/L (ref 4–13)
AST SERPL W P-5'-P-CCNC: 16 U/L (ref 5–45)
BILIRUB SERPL-MCNC: 0.64 MG/DL (ref 0.2–1)
BUN SERPL-MCNC: 14 MG/DL (ref 5–25)
CALCIUM SERPL-MCNC: 10.1 MG/DL (ref 8.3–10.1)
CHLORIDE SERPL-SCNC: 105 MMOL/L (ref 100–108)
CHOLEST SERPL-MCNC: 165 MG/DL
CO2 SERPL-SCNC: 27 MMOL/L (ref 21–32)
CREAT SERPL-MCNC: 0.58 MG/DL (ref 0.6–1.3)
GFR SERPL CREATININE-BSD FRML MDRD: 90 ML/MIN/1.73SQ M
GLUCOSE P FAST SERPL-MCNC: 93 MG/DL (ref 65–99)
HDLC SERPL-MCNC: 58 MG/DL
LDLC SERPL CALC-MCNC: 83 MG/DL (ref 0–100)
NONHDLC SERPL-MCNC: 107 MG/DL
POTASSIUM SERPL-SCNC: 3.7 MMOL/L (ref 3.5–5.3)
PROT SERPL-MCNC: 7.9 G/DL (ref 6.4–8.2)
SODIUM SERPL-SCNC: 140 MMOL/L (ref 136–145)
TRIGL SERPL-MCNC: 122 MG/DL

## 2022-02-09 PROCEDURE — 36415 COLL VENOUS BLD VENIPUNCTURE: CPT

## 2022-02-09 PROCEDURE — 80053 COMPREHEN METABOLIC PANEL: CPT

## 2022-02-09 PROCEDURE — 80061 LIPID PANEL: CPT

## 2022-02-21 ENCOUNTER — OFFICE VISIT (OUTPATIENT)
Dept: FAMILY MEDICINE CLINIC | Facility: CLINIC | Age: 77
End: 2022-02-21
Payer: MEDICARE

## 2022-02-21 VITALS
TEMPERATURE: 97.2 F | SYSTOLIC BLOOD PRESSURE: 142 MMHG | HEIGHT: 64 IN | WEIGHT: 147 LBS | DIASTOLIC BLOOD PRESSURE: 82 MMHG | BODY MASS INDEX: 25.1 KG/M2

## 2022-02-21 DIAGNOSIS — C50.812 MALIGNANT NEOPLASM OF OVERLAPPING SITES OF LEFT BREAST IN FEMALE, ESTROGEN RECEPTOR POSITIVE (HCC): ICD-10-CM

## 2022-02-21 DIAGNOSIS — E78.2 MIXED HYPERLIPIDEMIA: Primary | ICD-10-CM

## 2022-02-21 DIAGNOSIS — E03.9 ACQUIRED HYPOTHYROIDISM: ICD-10-CM

## 2022-02-21 DIAGNOSIS — Z17.0 MALIGNANT NEOPLASM OF OVERLAPPING SITES OF LEFT BREAST IN FEMALE, ESTROGEN RECEPTOR POSITIVE (HCC): ICD-10-CM

## 2022-02-21 DIAGNOSIS — D47.3 ESSENTIAL (HEMORRHAGIC) THROMBOCYTHEMIA (HCC): ICD-10-CM

## 2022-02-21 DIAGNOSIS — R73.9 HYPERGLYCEMIA: ICD-10-CM

## 2022-02-21 DIAGNOSIS — I10 PRIMARY HYPERTENSION: ICD-10-CM

## 2022-02-21 DIAGNOSIS — Z79.811 USE OF ANASTROZOLE (ARIMIDEX): ICD-10-CM

## 2022-02-21 PROCEDURE — 99214 OFFICE O/P EST MOD 30 MIN: CPT | Performed by: FAMILY MEDICINE

## 2022-02-21 NOTE — PATIENT INSTRUCTIONS
Problem List Items Addressed This Visit     Essential (hemorrhagic) thrombocythemia ([facility] [address] )     Noted in past  Check CBC         Relevant Orders    CBC and differential    Hyperglycemia     Blood sugar was 93, normal          Relevant Orders    Comprehensive metabolic panel    Hyperlipidemia - Primary     Patient's cholesterol has been quite good for quite a while now  Continues on pravastatin without side effects or problems  Recheck in 6 months  Relevant Orders    Cholesterol, total    Comprehensive metabolic panel    HDL cholesterol    LDL cholesterol, direct    Hypertension     The pressure today was minimally elevated  She is on hydrochlorothiazide  Her last blood pressure was quite good, however  Based on that, would not make any adjustments and would prefer to wait until next check  Dot she also does check her blood pressure at home, reports that it is doing quite well there  Relevant Orders    TSH, 3rd generation    Hypothyroidism     On Synthroid 50 mcg  Due in 6 months for next blood  Relevant Orders    TSH, 3rd generation    Malignant neoplasm of overlapping sites of left breast in female, estrogen receptor positive ([facility] [address] )     Still following with specialist   Patient reports she has had no particular concerns or changes  Use of anastrozole (Arimidex)     Currently on anastrozole  Following with Hematology/Oncology  COVID 19 Instructions    Kaden Pickett was advised to limit contact with others to essential tasks such as getting food, medications, and medical care  Proper handwashing reviewed, and Hand sanitzer when washing is not available  If the patient develops symptoms of COVID 19, the patient should call the office as soon as possible  For 2125-2255 Flu season, it is strongly recommended that Flu Vaccinations be obtained  Virtual Visits:  Rashaun: This works on smart phones (any phone with Internet browsing capability)    You should get a text message when the provider is ready to see you  Click on the link in the text message, and the call should start  You will need to type in your name, and allow camera and microphone access  This is HIPPA compliant, and secure  If you have not already done so, get immunized to COVID 19  We are committed to getting you vaccinated as soon as possible and will be closely following CDC and [facility]  guidelines as they are released and revised  Please refer to our COVID-19 vaccine webpage for the most up to date information on the vaccine and our distribution efforts  This site will also have the most up to date recommendations for COVID booster vaccine  [employer] tn    Call [phone] ([phone]), option 7    OUR NEW LOCATION:    [address], [address] [place] [address] [address] [place], [address]  Fax: [phone]    Lab services and OB/GYN are at this location as well

## 2022-02-21 NOTE — ASSESSMENT & PLAN NOTE
The pressure today was minimally elevated  She is on hydrochlorothiazide  Her last blood pressure was quite good, however  Based on that, would not make any adjustments and would prefer to wait until next check  Dot she also does check her blood pressure at home, reports that it is doing quite well there

## 2022-02-21 NOTE — PROGRESS NOTES
Assessment and Plan:    Problem List Items Addressed This Visit     Essential (hemorrhagic) thrombocythemia ([facility] [address] )     Noted in past  Check CBC         Relevant Orders    CBC and differential    Hyperglycemia     Blood sugar was 93, normal          Relevant Orders    Comprehensive metabolic panel    Hyperlipidemia - Primary     Patient's cholesterol has been quite good for quite a while now  Continues on pravastatin without side effects or problems  Recheck in 6 months  Relevant Orders    Cholesterol, total    Comprehensive metabolic panel    HDL cholesterol    LDL cholesterol, direct    Hypertension     The pressure today was minimally elevated  She is on hydrochlorothiazide  Her last blood pressure was quite good, however  Based on that, would not make any adjustments and would prefer to wait until next check  Dot she also does check her blood pressure at home, reports that it is doing quite well there  Relevant Orders    TSH, 3rd generation    Hypothyroidism     On Synthroid 50 mcg  Due in 6 months for next blood  Relevant Orders    TSH, 3rd generation    Malignant neoplasm of overlapping sites of left breast in female, estrogen receptor positive ([facility] [address] )     Still following with specialist   Patient reports she has had no particular concerns or changes  Use of anastrozole (Arimidex)     Currently on anastrozole  Following with Hematology/Oncology  Diagnoses and all orders for this visit:    Mixed hyperlipidemia  -     Cholesterol, total; Future  -     Comprehensive metabolic panel; Future  -     HDL cholesterol; Future  -     LDL cholesterol, direct; Future    Primary hypertension  -     TSH, 3rd generation; Future    Acquired hypothyroidism  -     TSH, 3rd generation;  Future    Malignant neoplasm of overlapping sites of left breast in female, estrogen receptor positive ([facility])    Use of anastrozole (Arimidex)    Hyperglycemia  -     Comprehensive metabolic panel; Future    Essential (hemorrhagic) thrombocythemia ([facility] [address] )  -     CBC and differential; Future              Subjective:      Patient ID: Livia Costello is a 77 y o  female  CC:    Chief Complaint   Patient presents with    Follow-up     for chronic conditions  mgb       HPI:    Stable  No concerns  Reviewed medical concerns  The following portions of the patient's history were reviewed and updated as appropriate: allergies, current medications, past family history, past medical history, past social history, past surgical history and problem list       Review of Systems   Constitutional: Negative  HENT: Negative  Eyes: Negative  Respiratory: Negative  Cardiovascular: Negative  Gastrointestinal: Negative  Endocrine: Negative  Genitourinary: Negative  Musculoskeletal: Negative  Skin: Negative  Allergic/Immunologic: Negative  Neurological: Negative  Hematological: Negative  Psychiatric/Behavioral: Negative  Data to review:   Sodium 140, potassium 3 7, calcium 10 1  Creatinine 0 58, GFR:  90  AST 16, ALT 22  Blood sugar 93  Cholesterol 165, LDL 83, HDL 58, triglycerides 122  Objective:    Vitals:    02/21/22 0851   BP: 142/82   BP Location: Right arm   Patient Position: Sitting   Cuff Size: Large   Temp: (!) 97 2 °F (36 2 °C)   TempSrc: Temporal   Weight: 66 7 kg (147 lb)   Height: 5' 3 75" (1 619 m)        Physical Exam  Vitals and nursing note reviewed  Constitutional:       Appearance: Normal appearance  HENT:      Head: Normocephalic  Cardiovascular:      Rate and Rhythm: Normal rate and regular rhythm  Pulses: Normal pulses  Carotid pulses are 2+ on the right side and 2+ on the left side  Heart sounds: Normal heart sounds  No murmur heard  No friction rub  No gallop  Pulmonary:      Effort: Pulmonary effort is normal  No respiratory distress  Breath sounds: Normal breath sounds   No stridor  No wheezing, rhonchi or rales  Chest:      Chest wall: No tenderness  Neurological:      Mental Status: She is alert  BMI Counseling: Body mass index is 25 43 kg/m²  The BMI is above normal  Nutrition recommendations include decreasing portion sizes, encouraging healthy choices of fruits and vegetables, decreasing fast food intake, consuming healthier snacks, limiting drinks that contain sugar, moderation in carbohydrate intake, increasing intake of lean protein, reducing intake of saturated and trans fat and reducing intake of cholesterol  Exercise recommendations include exercising 3-5 times per week  No pharmacotherapy was ordered  Rationale for BMI follow-up plan is due to patient being overweight or obese  Depression Screening and Follow-up Plan: Patient was screened for depression during today's encounter  They screened negative with a PHQ-2 score of 0

## 2022-02-21 NOTE — ASSESSMENT & PLAN NOTE
Patient's cholesterol has been quite good for quite a while now  Continues on pravastatin without side effects or problems  Recheck in 6 months

## 2022-02-21 NOTE — ASSESSMENT & PLAN NOTE
Patient has occasional problems with steps  Otherwise, she feels good  She walks for longer distances and does not really have any problems

## 2022-06-13 ENCOUNTER — HOSPITAL ENCOUNTER (OUTPATIENT)
Dept: MAMMOGRAPHY | Facility: MEDICAL CENTER | Age: 77
Discharge: HOME | End: 2022-06-13
Payer: MEDICARE

## 2022-06-13 VITALS — BODY MASS INDEX: 25.1 KG/M2 | HEIGHT: 64 IN | WEIGHT: 147.05 LBS

## 2022-06-13 DIAGNOSIS — Z12.31 ENCOUNTER FOR SCREENING MAMMOGRAM FOR MALIGNANT NEOPLASM OF BREAST: ICD-10-CM

## 2022-06-13 DIAGNOSIS — Z12.39 BREAST CANCER SCREENING, HIGH RISK PATIENT: ICD-10-CM

## 2022-06-13 PROCEDURE — 77067 SCR MAMMO BI INCL CAD: CPT

## 2022-06-13 PROCEDURE — 77063 BREAST TOMOSYNTHESIS BI: CPT

## 2022-07-18 ENCOUNTER — RA CDI HCC (OUTPATIENT)
Dept: OTHER | Facility: HOSPITAL | Age: 77
End: 2022-07-18

## 2022-07-18 NOTE — PROGRESS NOTES
[facility] [address]  coding opportunities       Chart reviewed, no opportunity found: CHART REVIEWED, NO OPPORTUNITY FOUND        Patients Insurance     Medicare Insurance: Medicare

## 2022-07-22 ENCOUNTER — TELEPHONE (OUTPATIENT)
Dept: HEMATOLOGY ONCOLOGY | Facility: CLINIC | Age: 77
End: 2022-07-22

## 2022-07-22 NOTE — TELEPHONE ENCOUNTER
Appointment Confirmation (to confirm pre existing appointments - ONLY)  No need to route   Appointment with  Dr aLdy Knutson   Appointment date & time 7/25 at 10:00am   Location [place]   Patient verbilized Understanding  yes

## 2022-07-24 DIAGNOSIS — E03.9 ACQUIRED HYPOTHYROIDISM: ICD-10-CM

## 2022-07-24 DIAGNOSIS — Z17.0 MALIGNANT NEOPLASM OF BREAST IN FEMALE, ESTROGEN RECEPTOR POSITIVE, UNSPECIFIED LATERALITY, UNSPECIFIED SITE OF BREAST (HCC): ICD-10-CM

## 2022-07-24 DIAGNOSIS — C50.919 MALIGNANT NEOPLASM OF BREAST IN FEMALE, ESTROGEN RECEPTOR POSITIVE, UNSPECIFIED LATERALITY, UNSPECIFIED SITE OF BREAST (HCC): ICD-10-CM

## 2022-07-25 ENCOUNTER — OFFICE VISIT (OUTPATIENT)
Dept: SURGICAL ONCOLOGY | Facility: CLINIC | Age: 77
End: 2022-07-25
Payer: MEDICARE

## 2022-07-25 VITALS
BODY MASS INDEX: 24.89 KG/M2 | WEIGHT: 145.8 LBS | HEART RATE: 74 BPM | OXYGEN SATURATION: 99 % | HEIGHT: 64 IN | DIASTOLIC BLOOD PRESSURE: 92 MMHG | TEMPERATURE: 98.4 F | RESPIRATION RATE: 18 BRPM | SYSTOLIC BLOOD PRESSURE: 150 MMHG

## 2022-07-25 DIAGNOSIS — Z12.39 BREAST CANCER SCREENING, HIGH RISK PATIENT: ICD-10-CM

## 2022-07-25 DIAGNOSIS — Z12.31 ENCOUNTER FOR SCREENING MAMMOGRAM FOR MALIGNANT NEOPLASM OF BREAST: ICD-10-CM

## 2022-07-25 DIAGNOSIS — C50.812 MALIGNANT NEOPLASM OF OVERLAPPING SITES OF LEFT BREAST IN FEMALE, ESTROGEN RECEPTOR POSITIVE (HCC): Primary | ICD-10-CM

## 2022-07-25 DIAGNOSIS — Z79.811 USE OF ANASTROZOLE (ARIMIDEX): ICD-10-CM

## 2022-07-25 DIAGNOSIS — E78.2 MIXED HYPERLIPIDEMIA: ICD-10-CM

## 2022-07-25 DIAGNOSIS — I10 ESSENTIAL HYPERTENSION: ICD-10-CM

## 2022-07-25 DIAGNOSIS — Z17.0 MALIGNANT NEOPLASM OF OVERLAPPING SITES OF LEFT BREAST IN FEMALE, ESTROGEN RECEPTOR POSITIVE (HCC): Primary | ICD-10-CM

## 2022-07-25 PROCEDURE — 99214 OFFICE O/P EST MOD 30 MIN: CPT | Performed by: SURGERY

## 2022-07-25 RX ORDER — ANASTROZOLE 1 MG/1
TABLET ORAL
Qty: 90 TABLET | Refills: 1 | Status: SHIPPED | OUTPATIENT
Start: 2022-07-25

## 2022-07-25 NOTE — PROGRESS NOTES
Surgical Oncology Follow Up       [facility] SURGICAL ONCOLOGY [facility] [id number]    [facility]  1945  [id number]  [facility] SURGICAL ONCOLOGY [facility] [address]    Chief Complaint   Patient presents with    Follow-up       Assessment/Plan   Diagnoses and all orders for this visit:    Malignant neoplasm of overlapping sites of left breast in female, estrogen receptor positive ([facility] [address] )    Use of anastrozole (Arimidex)    Breast cancer screening, high risk patient  -     Mammo screening right w 3d & cad; Future    Encounter for screening mammogram for malignant neoplasm of breast   -     Mammo screening right w 3d & cad; Future        Advance Care Planning/Advance Directives:  Discussed disease status, cancer treatment plans and/or cancer treatment goals with the patient  Oncology History:    Oncology History Overview Note         Malignant neoplasm of overlapping sites of left breast in female, estrogen receptor positive ([facility] [address] )    Initial Diagnosis    Malignant neoplasm of left breast in female, estrogen receptor positive ([facility] [address] )     3/19/2014 Surgery    Left breast US guided core biopsy,  ID[facility]      4/14/2014 Surgery    Left breast mastectomy, SLNB, AND     10/29/2014 - 12/8/2014 Radiation    Mastectomy scar/left chest wall and the left supraclavicular fossa: 50 4Gy in 28 daily 180cGy fractions  Dr Estuardo Jimenes  2014 -  Chemotherapy    05/2014  Dose dense AC X 4 cycles  07/2014  Paclitaxel X 12  Herceptin  Dr Lady Romero  Hormone Therapy    Anastrazole  Dr Lady Romero         History of Present Illness:  Breast cancer follow-up, continues on anastrozole, no breasts referable concerns  -Interval History:  Recent contralateral mammogram    Review of Systems:  Review of Systems   Constitutional: Negative  Negative for appetite change and fever  Eyes: Negative      Respiratory: Negative for shortness of breath  Cardiovascular: Negative  Gastrointestinal: Negative  Endocrine: Negative  Genitourinary: Negative  Musculoskeletal: Negative  Negative for arthralgias and myalgias  Skin: Negative  Allergic/Immunologic: Negative  Neurological: Negative  Hematological: Negative  Negative for adenopathy  Does not bruise/bleed easily  Psychiatric/Behavioral: Negative          Patient Active Problem List   Diagnosis    Malignant neoplasm of overlapping sites of left breast in female, estrogen receptor positive ([facility] [address] )    Use of anastrozole (Arimidex)    Hyperglycemia    Hyperlipidemia    Hypertension    Hypothyroidism    Menopause    Chronic pain of both knees    Myalgia    Breast cancer screening, high risk patient    Occult blood in stools    Diverticulosis    Osteopenia    Aspirin long-term use    Essential (hemorrhagic) thrombocythemia ([facility] [address] )     Past Medical History:   Diagnosis Date    Abnormal findings on diagnostic imaging of breast     last assessed 3/19/14    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 ([facility] [address] )     last assessed 10/2/15    Disease of thyroid gland     Fibroadenoma of right breast     History of chemotherapy     Hx of radiation therapy     last assessed 17    Hypertension     Hypokalemia     last assessed 4/15/16    Long term use of drug     herceptin,last assessed 17    Overweight 3/26/2020     Past Surgical History:   Procedure Laterality Date    BREAST BIOPSY Left 2014    [facility]    BREAST BIOPSY Right 2015    FIBROADENOMA     SECTION      X2    IVC FILTER RETRIEVAL  2014    central iv line with subcutaneous reservoir mediaport    LYMPHADENECTOMY Left 2014    axxillary    MASTECTOMY Left 2014    PORTACATH PLACEMENT  2014    SENTINEL LYMPH NODE BIOPSY Left 2014    AND    TONSILLECTOMY  1956    TUBAL LIGATION       Family History Problem Relation Age of Onset    Heart failure Mother     Coronary artery disease Mother     Diabetes Mother     No Known Problems Father     Breast cancer Sister 76    BRCA1 Negative Sister     No Known Problems Daughter     No Known Problems Maternal Grandmother     No Known Problems Maternal Grandfather     No Known Problems Paternal Grandmother     No Known Problems Paternal Grandfather     No Known Problems Maternal Aunt      Social History     Socioeconomic History    Marital status: /Civil Union     Spouse name: Not on file    Number of children: Not on file    Years of education: Not on file    Highest education level: Not on file   Occupational History    Occupation: retired   Tobacco Use    Smoking status: Never Smoker    Smokeless tobacco: Never Used    Tobacco comment: no secondhand smoe exposure   Vaping Use    Vaping Use: Never used   Substance and Sexual Activity    Alcohol use:  Yes     Alcohol/week: 2 0 - 3 0 standard drinks     Types: 2 - 3 Glasses of wine per week     Comment: social    Drug use: No    Sexual activity: Not Currently     Birth control/protection: Post-menopausal   Other Topics Concern    Not on file   Social History Narrative    Exercises daily     Social Determinants of Health     Financial Resource Strain: Not on file   Food Insecurity: Not on file   Transportation Needs: Not on file   Physical Activity: Not on file   Stress: Not on file   Social Connections: Not on file   Intimate Partner Violence: Not on file   Housing Stability: Not on file       Current Outpatient Medications:     anastrozole (ARIMIDEX) 1 mg tablet, TAKE 1 TABLET BY MOUTH EVERY DAY, Disp: 90 tablet, Rfl: 1    aspirin (ECOTRIN LOW STRENGTH) 81 mg EC tablet, Take 81 mg by mouth daily, Disp: , Rfl:     Calcium Carbonate-Vitamin D 600-400 MG-UNIT per tablet, Take 2 tablets by mouth daily, Disp: , Rfl:     hydrochlorothiazide (MICROZIDE) 12 5 mg capsule, TAKE 1 CAPSULE BY MOUTH EVERY DAY, Disp: 90 capsule, Rfl: 3    levothyroxine 50 mcg tablet, Take 1 tablet (50 mcg total) by mouth daily, Disp: 90 tablet, Rfl: 3    Multiple Vitamin (MULTIVITAMINS PO), Take by mouth, Disp: , Rfl:     pravastatin (PRAVACHOL) 40 mg tablet, TAKE 1 TABLET BY MOUTH EVERY DAY, Disp: 90 tablet, Rfl: 3  Allergies   Allergen Reactions    Mushroom Extract Complex - Food Allergy Hives       The following portions of the patient's history were reviewed and updated as appropriate: allergies, current medications, past family history, past medical history, past social history, past surgical history and problem list         Vitals:    07/25/22 1000   BP: 150/92   Pulse: 74   Resp: 18   Temp: 98 4 °F (36 9 °C)   SpO2: 99%       Physical Exam  Constitutional:       General: She is not in acute distress  Appearance: Normal appearance  She is well-developed  HENT:      Head: Normocephalic and atraumatic  Cardiovascular:      Heart sounds: Normal heart sounds  Pulmonary:      Breath sounds: Normal breath sounds  Chest:   Breasts:      Right: No inverted nipple, mass, nipple discharge, skin change, tenderness, axillary adenopathy or supraclavicular adenopathy  Left: Skin change (Mastectomy scar with radiation changes) present  No mass, tenderness, axillary adenopathy or supraclavicular adenopathy  Abdominal:      Palpations: Abdomen is soft  Lymphadenopathy:      Upper Body:      Right upper body: No supraclavicular, axillary or pectoral adenopathy  Left upper body: No supraclavicular, axillary or pectoral adenopathy  Neurological:      Mental Status: She is alert and oriented to person, place, and time  Psychiatric:         Mood and Affect: Mood normal            Results:  Labs:      Imaging  06/13/2022 right 3D screening mammogram is benign BI-RADS one with a density of one    I reviewed the above imaging data      Discussion/Summary:  67-year-old female status post left modified radical mastectomy along with adjuvant chemo and Herceptin for a stage IIB invasive duct carcinoma  She also had postmastectomy radiation  She continues on anastrozole with no reported side effects  There is no evidence of disease based on exam today  Her contralateral mammogram was benign  I will make arrangements for her mammogram for next year  I will see her again at that time or sooner should the need arise

## 2022-07-26 RX ORDER — LEVOTHYROXINE SODIUM 0.05 MG/1
TABLET ORAL
Qty: 90 TABLET | Refills: 3 | Status: SHIPPED | OUTPATIENT
Start: 2022-07-26

## 2022-07-26 RX ORDER — HYDROCHLOROTHIAZIDE 12.5 MG/1
CAPSULE, GELATIN COATED ORAL
Qty: 90 CAPSULE | Refills: 3 | Status: SHIPPED | OUTPATIENT
Start: 2022-07-26

## 2022-07-26 RX ORDER — PRAVASTATIN SODIUM 40 MG
TABLET ORAL
Qty: 90 TABLET | Refills: 3 | Status: SHIPPED | OUTPATIENT
Start: 2022-07-26

## 2022-08-22 ENCOUNTER — APPOINTMENT (OUTPATIENT)
Dept: LAB | Facility: CLINIC | Age: 77
End: 2022-08-22
Payer: MEDICARE

## 2022-08-22 DIAGNOSIS — R73.9 HYPERGLYCEMIA: ICD-10-CM

## 2022-08-22 DIAGNOSIS — E03.9 ACQUIRED HYPOTHYROIDISM: ICD-10-CM

## 2022-08-22 DIAGNOSIS — E78.2 MIXED HYPERLIPIDEMIA: ICD-10-CM

## 2022-08-22 DIAGNOSIS — I10 PRIMARY HYPERTENSION: ICD-10-CM

## 2022-08-22 DIAGNOSIS — D47.3 ESSENTIAL (HEMORRHAGIC) THROMBOCYTHEMIA (HCC): ICD-10-CM

## 2022-08-22 LAB
ALBUMIN SERPL BCP-MCNC: 3.6 G/DL (ref 3.5–5)
ALP SERPL-CCNC: 68 U/L (ref 46–116)
ALT SERPL W P-5'-P-CCNC: 23 U/L (ref 12–78)
ANION GAP SERPL CALCULATED.3IONS-SCNC: 6 MMOL/L (ref 4–13)
AST SERPL W P-5'-P-CCNC: 20 U/L (ref 5–45)
BASOPHILS # BLD AUTO: 0.04 THOUSANDS/ΜL (ref 0–0.1)
BASOPHILS NFR BLD AUTO: 1 % (ref 0–1)
BILIRUB SERPL-MCNC: 0.65 MG/DL (ref 0.2–1)
BUN SERPL-MCNC: 13 MG/DL (ref 5–25)
CALCIUM SERPL-MCNC: 9.4 MG/DL (ref 8.3–10.1)
CHLORIDE SERPL-SCNC: 103 MMOL/L (ref 96–108)
CHOLEST SERPL-MCNC: 142 MG/DL
CO2 SERPL-SCNC: 27 MMOL/L (ref 21–32)
CREAT SERPL-MCNC: 0.46 MG/DL (ref 0.6–1.3)
EOSINOPHIL # BLD AUTO: 0.13 THOUSAND/ΜL (ref 0–0.61)
EOSINOPHIL NFR BLD AUTO: 2 % (ref 0–6)
ERYTHROCYTE [DISTWIDTH] IN BLOOD BY AUTOMATED COUNT: 14.1 % (ref 11.6–15.1)
GFR SERPL CREATININE-BSD FRML MDRD: 96 ML/MIN/1.73SQ M
GLUCOSE P FAST SERPL-MCNC: 94 MG/DL (ref 65–99)
HCT VFR BLD AUTO: 43.2 % (ref 34.8–46.1)
HDLC SERPL-MCNC: 61 MG/DL
HGB BLD-MCNC: 14 G/DL (ref 11.5–15.4)
IMM GRANULOCYTES # BLD AUTO: 0.01 THOUSAND/UL (ref 0–0.2)
IMM GRANULOCYTES NFR BLD AUTO: 0 % (ref 0–2)
LDLC SERPL DIRECT ASSAY-MCNC: 66 MG/DL (ref 0–100)
LYMPHOCYTES # BLD AUTO: 1.17 THOUSANDS/ΜL (ref 0.6–4.47)
LYMPHOCYTES NFR BLD AUTO: 20 % (ref 14–44)
MCH RBC QN AUTO: 29.2 PG (ref 26.8–34.3)
MCHC RBC AUTO-ENTMCNC: 32.4 G/DL (ref 31.4–37.4)
MCV RBC AUTO: 90 FL (ref 82–98)
MONOCYTES # BLD AUTO: 0.54 THOUSAND/ΜL (ref 0.17–1.22)
MONOCYTES NFR BLD AUTO: 9 % (ref 4–12)
NEUTROPHILS # BLD AUTO: 4.04 THOUSANDS/ΜL (ref 1.85–7.62)
NEUTS SEG NFR BLD AUTO: 68 % (ref 43–75)
NRBC BLD AUTO-RTO: 0 /100 WBCS
PLATELET # BLD AUTO: 319 THOUSANDS/UL (ref 149–390)
PMV BLD AUTO: 10.2 FL (ref 8.9–12.7)
POTASSIUM SERPL-SCNC: 3.9 MMOL/L (ref 3.5–5.3)
PROT SERPL-MCNC: 7.3 G/DL (ref 6.4–8.4)
RBC # BLD AUTO: 4.8 MILLION/UL (ref 3.81–5.12)
SODIUM SERPL-SCNC: 136 MMOL/L (ref 135–147)
TSH SERPL DL<=0.05 MIU/L-ACNC: 2.61 UIU/ML (ref 0.45–4.5)
WBC # BLD AUTO: 5.93 THOUSAND/UL (ref 4.31–10.16)

## 2022-08-22 PROCEDURE — 80053 COMPREHEN METABOLIC PANEL: CPT

## 2022-08-22 PROCEDURE — 83718 ASSAY OF LIPOPROTEIN: CPT

## 2022-08-22 PROCEDURE — 85025 COMPLETE CBC W/AUTO DIFF WBC: CPT

## 2022-08-22 PROCEDURE — 83721 ASSAY OF BLOOD LIPOPROTEIN: CPT

## 2022-08-22 PROCEDURE — 82465 ASSAY BLD/SERUM CHOLESTEROL: CPT

## 2022-08-22 PROCEDURE — 36415 COLL VENOUS BLD VENIPUNCTURE: CPT

## 2022-08-22 PROCEDURE — 84443 ASSAY THYROID STIM HORMONE: CPT

## 2022-08-29 ENCOUNTER — OFFICE VISIT (OUTPATIENT)
Dept: FAMILY MEDICINE CLINIC | Facility: CLINIC | Age: 77
End: 2022-08-29
Payer: MEDICARE

## 2022-08-29 VITALS
OXYGEN SATURATION: 98 % | SYSTOLIC BLOOD PRESSURE: 122 MMHG | HEIGHT: 64 IN | HEART RATE: 78 BPM | DIASTOLIC BLOOD PRESSURE: 80 MMHG | WEIGHT: 143.5 LBS | BODY MASS INDEX: 24.5 KG/M2 | TEMPERATURE: 96.2 F

## 2022-08-29 DIAGNOSIS — D47.3 ESSENTIAL (HEMORRHAGIC) THROMBOCYTHEMIA (HCC): ICD-10-CM

## 2022-08-29 DIAGNOSIS — R73.9 HYPERGLYCEMIA: ICD-10-CM

## 2022-08-29 DIAGNOSIS — E03.9 ACQUIRED HYPOTHYROIDISM: ICD-10-CM

## 2022-08-29 DIAGNOSIS — Z17.0 MALIGNANT NEOPLASM OF OVERLAPPING SITES OF LEFT BREAST IN FEMALE, ESTROGEN RECEPTOR POSITIVE (HCC): ICD-10-CM

## 2022-08-29 DIAGNOSIS — Z79.811 USE OF ANASTROZOLE (ARIMIDEX): ICD-10-CM

## 2022-08-29 DIAGNOSIS — C50.812 MALIGNANT NEOPLASM OF OVERLAPPING SITES OF LEFT BREAST IN FEMALE, ESTROGEN RECEPTOR POSITIVE (HCC): ICD-10-CM

## 2022-08-29 DIAGNOSIS — E78.2 MIXED HYPERLIPIDEMIA: ICD-10-CM

## 2022-08-29 DIAGNOSIS — I10 PRIMARY HYPERTENSION: Primary | ICD-10-CM

## 2022-08-29 PROCEDURE — G0439 PPPS, SUBSEQ VISIT: HCPCS | Performed by: FAMILY MEDICINE

## 2022-08-29 PROCEDURE — 99214 OFFICE O/P EST MOD 30 MIN: CPT | Performed by: FAMILY MEDICINE

## 2022-08-29 NOTE — ASSESSMENT & PLAN NOTE
Patient did have slightly elevated platelet count previously, but her last several counts have been normal   Check in 6 months

## 2022-08-29 NOTE — ASSESSMENT & PLAN NOTE
Stable at the moment  Has been 8 years  Follows with   [facility], as well as Dr Charity Sims  No changes

## 2022-08-29 NOTE — ASSESSMENT & PLAN NOTE
Cholesterol stable  Doing extremely well  Continue pravastatin 40 mg  Not currently having any side effects or problems  LFTs were normal   Check in 6 months

## 2022-08-29 NOTE — PROGRESS NOTES
Assessment and Plan:     Problem List Items Addressed This Visit     Essential (hemorrhagic) thrombocythemia ([facility] [address] )     Patient did have slightly elevated platelet count previously, but her last several counts have been normal   Check in 6 months  Relevant Orders    CBC and differential    Hyperglycemia     Blood sugar was normal on the most recent check  Relevant Orders    Comprehensive metabolic panel    Hyperlipidemia     Cholesterol stable  Doing extremely well  Continue pravastatin 40 mg  Not currently having any side effects or problems  LFTs were normal   Check in 6 months  Relevant Orders    Comprehensive metabolic panel    Lipid panel    Hypertension - Primary     Blood pressure today was excellent  No changes  Relevant Orders    Comprehensive metabolic panel    TSH, 3rd generation    CBC and differential    Hypothyroidism     TSH was stable  Check in 6 months  Relevant Orders    TSH, 3rd generation    Malignant neoplasm of overlapping sites of left breast in female, estrogen receptor positive ([facility] [address] )     Stable at the moment  Has been 8 years  Follows with Dr Kesha Wilhelm, as well as Dr Tommy Dia  No changes  Use of anastrozole (Arimidex)     Patient is currently continuing to use Arimidex  Follow-up with Hematology/Oncology  No change  Depression Screening and Follow-up Plan: Patient was screened for depression during today's encounter  They screened negative with a PHQ-2 score of 0  Preventive health issues were discussed with patient, and age appropriate screening tests were ordered as noted in patient's After Visit Summary  Personalized health advice and appropriate referrals for health education or preventive services given if needed, as noted in patient's After Visit Summary  History of Present Illness:     Patient presents for a Medicare Wellness Visit    Patient is here to follow-up on several issues        Hyperlipidemia: Patient's cholesterol reviewed  Currently on pravastatin 40 mg  Thrombocythemia:  Noted previously  Platelet count was perfectly normal     Hypertension:  Blood pressure reviewed  Currently on hydrochlorothiazide  Hypothyroid:  Currently on Synthroid 50 mcg  Breast cancer:  Patient has been initially treated approximately 8 years ago  Continues on anastrozole  Does follow-up with the surgeon  She also follows with Hematology/Oncology  Patient Care Team:  Evie Bethea MD as PCP - General  MD Michael Wood Merceda Cooper, MD Connie French, MD  Via Penny Landa MD (Radiation Oncology)     Review of Systems:     Review of Systems   Constitutional: Negative  HENT: Negative  Eyes: Negative  Respiratory: Negative  Cardiovascular: Negative  Gastrointestinal: Negative  Endocrine: Negative  Genitourinary: Negative  Musculoskeletal: Negative  Skin: Negative  Allergic/Immunologic: Negative  Neurological: Negative  Hematological: Negative  Psychiatric/Behavioral: Negative           Problem List:     Patient Active Problem List   Diagnosis    Malignant neoplasm of overlapping sites of left breast in female, estrogen receptor positive ([facility] [address] )    Use of anastrozole (Arimidex)    Hyperglycemia    Hyperlipidemia    Hypertension    Hypothyroidism    Menopause    Chronic pain of both knees    Myalgia    Breast cancer screening, high risk patient    Occult blood in stools    Diverticulosis    Osteopenia    Aspirin long-term use    Essential (hemorrhagic) thrombocythemia ([facility] [address] )      Past Medical and Surgical History:     Past Medical History:   Diagnosis Date    Abnormal findings on diagnostic imaging of breast     last assessed 3/19/14    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 ([facility] [address] )     last assessed 10/2/15    Disease of thyroid gland     Fibroadenoma of right breast     History of chemotherapy     Hx of radiation therapy     last assessed 17    Hypertension     Hypokalemia     last assessed 4/15/16    Long term use of drug     herceptin,last assessed 17    Overweight 3/26/2020     Past Surgical History:   Procedure Laterality Date    BREAST BIOPSY Left 2014    [facility]    BREAST BIOPSY Right 2015    FIBROADENOMA     SECTION      X2    IVC FILTER RETRIEVAL  2014    central iv line with subcutaneous reservoir mediaport    LYMPHADENECTOMY Left 2014    axxillary    MASTECTOMY Left 2014    PORTACATH PLACEMENT  2014    SENTINEL LYMPH NODE BIOPSY Left 2014    AND    TONSILLECTOMY  195    TUBAL LIGATION        Family History:     Family History   Problem Relation Age of Onset    Heart failure Mother     Coronary artery disease Mother     Diabetes Mother     No Known Problems Father     Breast cancer Sister 76    BRCA1 Negative Sister     No Known Problems Daughter     No Known Problems Maternal Grandmother     No Known Problems Maternal Grandfather     No Known Problems Paternal Grandmother     No Known Problems Paternal Grandfather     No Known Problems Maternal Aunt       Social History:     Social History     Socioeconomic History    Marital status: /Civil Union     Spouse name: None    Number of children: None    Years of education: None    Highest education level: None   Occupational History    Occupation: retired   Tobacco Use    Smoking status: Never Smoker    Smokeless tobacco: Never Used    Tobacco comment: no secondhand smoe exposure   Vaping Use    Vaping Use: Never used   Substance and Sexual Activity    Alcohol use:  Yes     Alcohol/week: 2 0 - 3 0 standard drinks     Types: 2 - 3 Glasses of wine per week     Comment: social    Drug use: No    Sexual activity: Not Currently     Birth control/protection: Post-menopausal   Other Topics Concern    None   Social History Narrative    Exercises daily Social Determinants of Health     Financial Resource Strain: Low Risk     Difficulty of Paying Living Expenses: Not hard at all   Food Insecurity: Not on file   Transportation Needs: No Transportation Needs    Lack of Transportation (Medical): No    Lack of Transportation (Non-Medical): No   Physical Activity: Not on file   Stress: Not on file   Social Connections: Not on file   Intimate Partner Violence: Not on file   Housing Stability: Not on file      Medications and Allergies:     Current Outpatient Medications   Medication Sig Dispense Refill    anastrozole (ARIMIDEX) 1 mg tablet TAKE 1 TABLET BY MOUTH EVERY DAY 90 tablet 1    aspirin (ECOTRIN LOW STRENGTH) 81 mg EC tablet Take 81 mg by mouth daily      Calcium Carbonate-Vitamin D 600-400 MG-UNIT per tablet Take 2 tablets by mouth daily      hydrochlorothiazide (MICROZIDE) 12 5 mg capsule TAKE 1 CAPSULE BY MOUTH EVERY DAY 90 capsule 3    levothyroxine 50 mcg tablet TAKE 1 TABLET BY MOUTH EVERY DAY 90 tablet 3    Multiple Vitamin (MULTIVITAMINS PO) Take by mouth      pravastatin (PRAVACHOL) 40 mg tablet TAKE 1 TABLET BY MOUTH EVERY DAY 90 tablet 3     No current facility-administered medications for this visit       Allergies   Allergen Reactions    Mushroom Extract Complex - Food Allergy Hives      Immunizations:     Immunization History   Administered Date(s) Administered    COVID-19 MODERNA VACC 0 5 ML IM 02/19/2021, 03/26/2021, 11/08/2021, 04/13/2022    H1N1, All Formulations 01/09/2010    Influenza Split High Dose Preservative Free IM 10/02/2015, 10/14/2016, 11/15/2017    Influenza, high dose seasonal 0 7 mL 10/26/2018, 11/07/2019, 10/10/2020, 10/19/2021    Influenza, seasonal, injectable 1945, 11/02/2014    Pneumococcal Conjugate 13-Valent 10/14/2016    Pneumococcal Polysaccharide PPV23 10/27/2011    Tdap 1945    Zoster 10/02/2013      Health Maintenance:         Topic Date Due    Hepatitis C Screening  Never done   Lucila Nielsen Cervical Cancer Screening  05/13/2023    Breast Cancer Screening: Mammogram  06/13/2023    DXA SCAN  05/13/2026         Topic Date Due    Influenza Vaccine (1) 09/01/2022      Medicare Screening Tests and Risk Assessments:     Leia Brandt is here for her Subsequent Wellness visit  Health Risk Assessment:   Patient rates overall health as very good  Patient feels that their physical health rating is same  Patient is very satisfied with their life  Eyesight was rated as same  Hearing was rated as same  Patient feels that their emotional and mental health rating is same  Patients states they are never, rarely angry  Patient states they are never, rarely unusually tired/fatigued  Pain experienced in the last 7 days has been none  Patient states that she has experienced no weight loss or gain in last 6 months  Depression Screening:   PHQ-2 Score: 0      Fall Risk Screening: In the past year, patient has experienced: no history of falling in past year      Urinary Incontinence Screening:   Patient has not leaked urine accidently in the last six months  Home Safety:  Patient does not have trouble with stairs inside or outside of their home  Patient has working smoke alarms and has working carbon monoxide detector  Home safety hazards include: none  Nutrition:   Current diet is Regular  Medications:   Patient is currently taking over-the-counter supplements  OTC medications include: see medication list  Patient is not able to manage medications  Activities of Daily Living (ADLs)/Instrumental Activities of Daily Living (IADLs):   Walk and transfer into and out of bed and chair?: Yes  Dress and groom yourself?: Yes    Bathe or shower yourself?: Yes    Feed yourself?  Yes  Do your laundry/housekeeping?: Yes  Manage your money, pay your bills and track your expenses?: Yes  Make your own meals?: Yes    Do your own shopping?: Yes    Previous Hospitalizations:   Any hospitalizations or ED visits within the last 12 months?: No      Advance Care Planning:   Living will: Yes    Durable POA for healthcare: Yes    Advanced directive: Yes    Advanced directive counseling given: Yes    Five wishes given: No      Cognitive Screening:   Provider or family/friend/caregiver concerned regarding cognition?: No    PREVENTIVE SCREENINGS      Cardiovascular Screening:    General: History Lipid Disorder and Screening Current      Diabetes Screening:     General: Screening Current      Colorectal Cancer Screening:     General: Screening Current      Breast Cancer Screening:     General: History Breast Cancer and Risks and Benefits Discussed      Cervical Cancer Screening:    General: Screening Not Indicated      Osteoporosis Screening:    General: Screening Current      Abdominal Aortic Aneurysm (AAA) Screening:        General: Screening Not Indicated      Lung Cancer Screening:     General: Screening Not Indicated      Hepatitis C Screening:    General: Screening Not Indicated    Screening, Brief Intervention, and Referral to Treatment (SBIRT)    Screening  Typical number of drinks in a day: 1  Typical number of drinks in a week: 7  Interpretation: Low risk drinking behavior  Single Item Drug Screening:  How often have you used an illegal drug (including marijuana) or a prescription medication for non-medical reasons in the past year? never    Single Item Drug Screen Score: 0  Interpretation: Negative screen for possible drug use disorder    No exam data present     Labs:  Sodium 136, potassium 3 9, calcium 9 4  Blood sugar 94  Creatinine 0 46, GFR 96  AST 20, ALT 23  Total cholesterol 142  LDL 66  HDL 61  TSH 2 61  White count 5 93, hemoglobin 14 0, hematocrit 43 2, platelets 744        Physical Exam:     /80 (BP Location: Left arm, Patient Position: Sitting, Cuff Size: Standard)   Pulse 78   Temp (!) 96 2 °F (35 7 °C) (Temporal)   Ht 5' 3 75" (1 619 m)   Wt 65 1 kg (143 lb 8 oz)   SpO2 98%   BMI 24 83 kg/m² Physical Exam  Vitals and nursing note reviewed  Constitutional:       Appearance: Normal appearance  HENT:      Head: Normocephalic  Cardiovascular:      Rate and Rhythm: Normal rate and regular rhythm  Pulses: Normal pulses  Carotid pulses are 2+ on the right side and 2+ on the left side  Heart sounds: Normal heart sounds  No murmur heard  No friction rub  No gallop  Pulmonary:      Effort: Pulmonary effort is normal  No respiratory distress  Breath sounds: Normal breath sounds  No stridor  No wheezing, rhonchi or rales  Chest:      Chest wall: No tenderness  Neurological:      Mental Status: She is alert            Seema Rausch MD

## 2022-09-23 ENCOUNTER — OFFICE VISIT (OUTPATIENT)
Dept: HEMATOLOGY ONCOLOGY | Facility: CLINIC | Age: 77
End: 2022-09-23
Payer: MEDICARE

## 2022-09-23 VITALS
SYSTOLIC BLOOD PRESSURE: 122 MMHG | HEIGHT: 64 IN | RESPIRATION RATE: 18 BRPM | HEART RATE: 69 BPM | DIASTOLIC BLOOD PRESSURE: 70 MMHG | BODY MASS INDEX: 24.24 KG/M2 | OXYGEN SATURATION: 96 % | WEIGHT: 142 LBS | TEMPERATURE: 96.9 F

## 2022-09-23 DIAGNOSIS — C50.812 MALIGNANT NEOPLASM OF OVERLAPPING SITES OF LEFT BREAST IN FEMALE, ESTROGEN RECEPTOR POSITIVE (HCC): Primary | ICD-10-CM

## 2022-09-23 DIAGNOSIS — Z17.0 MALIGNANT NEOPLASM OF OVERLAPPING SITES OF LEFT BREAST IN FEMALE, ESTROGEN RECEPTOR POSITIVE (HCC): Primary | ICD-10-CM

## 2022-09-23 PROCEDURE — 99214 OFFICE O/P EST MOD 30 MIN: CPT | Performed by: INTERNAL MEDICINE

## 2022-09-23 NOTE — PROGRESS NOTES
Hematology / Oncology Outpatient Follow Up Note    Kimo Lepe 69 y o  female Elina Kimball KZC:[id number]         Date:  9/23/2022    Assessment / Plan:  A 66 year old postmenopausal woman with stage IIB left breast cancer, grade 3, ER/CT positive, HER-2 Fish equivocal disease  She underwent mastectomy with axillary lymph node dissection, without reconstruction resulting in the SUNITHA  She has 3 positive lymph nodes  Multiple HER-2 fish test were consistently showed a borderline amplification  She was treated as HER-2 positive disease  She completed adjuvant chemotherapy as well as one year course of adjuvant Herceptin, in June 2015  She is currently on adjuvant hormonal therapy with anastrozole with no side effects  She has no evidence recurrent disease, based on her symptoms and physical examinations  I recommended her to stay on anastrozole for 2 more years to complete 10 years of adjuvant hormonal therapy  She is in agreement with my recommendations  I will see her again in a year for routine follow-up      Subjective:      HPI:  A 80year old postmenopausal woman, who underwent regular screening mammography, which showed an abnormality in her left breast  Therefore, she underwent ultrasound-guided biopsy in March 9, 2014, which showed invasive ductal carcinoma  Subsequently, she underwent mastectomy with axillary lymph node dissection in April 4, 2014 which showed a 3 5 cm invasive ductal carcinoma, grade 3  3/11 axillary lymph node were positive for metastatic disease  This was %, CT 10-15% positive, HER-2 2+ disease  HER-2 Fish was equivoal for the gene amplification  I requested pathology Department to set another section for the second HER-2 Fish, which is pending  She presents today to discuss adjuvant treatment options  She is otherwise healthy with past medical history including hypertension, hypothyroidism, and hypercholesterolemia  She has no complaint of pain   Her weight has been stable  She has no respiratory symptoms  She has no family history of breast cancer or ovarian cancer  Her performance status is normal             Interval History:  A 69year old postmenopausal woman, with stage IIB left breast cancer, grade 3, ER positive, HER-2 fish, borderline disease  She had 3 positive lymph nodes, when she underwent mastectomy with lymph node dissection  We have tested her to fish on several occasion, all of which came back as a borderline disease  We decided to treat her as HER-2 positive disease  She completed adjuvant chemotherapy with a c  followed by Taxol and Herceptin  She finished adjuvant Herceptin monotherapy in June 2015 without cardiac toxicity  She is currently on adjuvant hormonal therapy with anastrozole  She came in today for routine follow-up   she feels well with the absolutely no new symptoms  She denied musculoskeletal symptoms  She has very occasional mild hot flashes  She has no complaint of pain  Her weight is stable  She has no respiratory symptoms  Her performance status is normal         Objective:      Primary Diagnosis:     Left breast cancer  Stage IIB (pT2, N1b, M0) grade 3, ER positive, PA weakly positive, HER-2 fish, borderline disease  Diagnosed in April 2014       Cancer Staging:  Cancer Staging  No matching staging information was found for the patient         Previous Hematologic/ Oncologic Treatment:      1  Adjuvant chemotherapy with dose dense AC X4 followed by weekly paclitaxel and Herceptin x12  Completed in September 2014  2  postmastectomy radiation therapy completed in December 2014    3  adjuvant Herceptin monotherapy, completed in June 2015       Current Hematologic/ Oncologic Treatment:       1  adjuvant hormonal therapy with anastrozole since October 2014      Disease Status:      SUNITHA status post mastectomy with lymph node dissection       Test Results:     Pathology:     3 5 cm of invasive ductal carcinoma, grade 3  3/11 axillary lymph node positive for metastatic disease  % positive, FL 10-15% positive, HER-2 2+ disease  HER-2 Fish is equivocal  Second, HER-2 Fish was borderline    Stage IIB(pT2, pN1b, M0)        Radiology:     DEXA scan in May 2021 showed T-score-1 0, consistent with osteopenia  Mammography on June 2022 was benign  BI-RADS 2       Laboratory:           Physical Exam:        General Appearance:    Alert, oriented          Eyes:    PERRL   Ears:    Normal external ear canals, both ears   Nose:   Nares normal, septum midline   Throat:   Mucosa moist  Pharynx without injection  Neck:   Supple         Lungs:     Clear to auscultation bilaterally   Chest Wall:    No tenderness or deformity    Heart:    Regular rate and rhythm         Abdomen:     Soft, non-tender, bowel sounds +, no organomegaly               Extremities:   Extremities no cyanosis or edema         Skin:   no rash or icterus  Lymph nodes:   Cervical, supraclavicular, and axillary nodes normal   Neurologic:   CNII-XII intact, normal strength, sensation and reflexes     Throughout             Breast exam:   status post left mastectomy without reconstruction  No palpable abnormality in her left chest wall  Right breast exam is negative             ROS: Review of Systems   All other systems reviewed and are negative  Imaging: No results found        Labs:   Lab Results   Component Value Date    WBC 5 93 08/22/2022    HGB 14 0 08/22/2022    HCT 43 2 08/22/2022    MCV 90 08/22/2022     08/22/2022     Lab Results   Component Value Date     09/25/2015    K 3 9 08/22/2022     08/22/2022    CO2 27 08/22/2022    ANIONGAP 9 09/25/2015    BUN 13 08/22/2022    CREATININE 0 46 (L) 08/22/2022    GLUCOSE 88 09/25/2015    GLUF 94 08/22/2022    CALCIUM 9 4 08/22/2022    CORRECTEDCA 9 7 07/20/2021    AST 20 08/22/2022    ALT 23 08/22/2022    ALKPHOS 68 08/22/2022    PROT 7 1 09/25/2015    BILITOT 0 55 09/25/2015    EGFR 96 08/22/2022         Current Medications: Reviewed  Allergies: Reviewed  PMH/FH/SH:  Reviewed      Vital Sign:    Body surface area is 1 69 meters squared      Wt Readings from Last 3 Encounters:   09/23/22 64 4 kg (142 lb)   08/29/22 65 1 kg (143 lb 8 oz)   07/25/22 66 1 kg (145 lb 12 8 oz)        Temp Readings from Last 3 Encounters:   09/23/22 (!) 96 9 °F (36 1 °C) (Tympanic)   08/29/22 (!) 96 2 °F (35 7 °C) (Temporal)   07/25/22 98 4 °F (36 9 °C) (Temporal)        BP Readings from Last 3 Encounters:   09/23/22 122/70   08/29/22 122/80   07/25/22 150/92         Pulse Readings from Last 3 Encounters:   09/23/22 69   08/29/22 78   07/25/22 74     @LASTSAO2(3)@

## 2023-02-28 ENCOUNTER — RA CDI HCC (OUTPATIENT)
Dept: OTHER | Facility: HOSPITAL | Age: 78
End: 2023-02-28

## 2023-02-28 ENCOUNTER — APPOINTMENT (OUTPATIENT)
Dept: LAB | Facility: CLINIC | Age: 78
End: 2023-02-28

## 2023-02-28 DIAGNOSIS — R73.9 HYPERGLYCEMIA: ICD-10-CM

## 2023-02-28 DIAGNOSIS — Z17.0 MALIGNANT NEOPLASM OF BREAST IN FEMALE, ESTROGEN RECEPTOR POSITIVE, UNSPECIFIED LATERALITY, UNSPECIFIED SITE OF BREAST (HCC): ICD-10-CM

## 2023-02-28 DIAGNOSIS — D47.3 ESSENTIAL (HEMORRHAGIC) THROMBOCYTHEMIA (HCC): ICD-10-CM

## 2023-02-28 DIAGNOSIS — C50.919 MALIGNANT NEOPLASM OF BREAST IN FEMALE, ESTROGEN RECEPTOR POSITIVE, UNSPECIFIED LATERALITY, UNSPECIFIED SITE OF BREAST (HCC): ICD-10-CM

## 2023-02-28 DIAGNOSIS — I10 PRIMARY HYPERTENSION: ICD-10-CM

## 2023-02-28 DIAGNOSIS — E78.2 MIXED HYPERLIPIDEMIA: ICD-10-CM

## 2023-02-28 DIAGNOSIS — E03.9 ACQUIRED HYPOTHYROIDISM: ICD-10-CM

## 2023-02-28 LAB
ALBUMIN SERPL BCP-MCNC: 3.8 G/DL (ref 3.5–5)
ALP SERPL-CCNC: 57 U/L (ref 46–116)
ALT SERPL W P-5'-P-CCNC: 22 U/L (ref 12–78)
ANION GAP SERPL CALCULATED.3IONS-SCNC: 6 MMOL/L (ref 4–13)
AST SERPL W P-5'-P-CCNC: 23 U/L (ref 5–45)
BASOPHILS # BLD AUTO: 0.04 THOUSANDS/ÂΜL (ref 0–0.1)
BASOPHILS NFR BLD AUTO: 1 % (ref 0–1)
BILIRUB SERPL-MCNC: 0.76 MG/DL (ref 0.2–1)
BUN SERPL-MCNC: 16 MG/DL (ref 5–25)
CALCIUM SERPL-MCNC: 9.7 MG/DL (ref 8.3–10.1)
CHLORIDE SERPL-SCNC: 103 MMOL/L (ref 96–108)
CHOLEST SERPL-MCNC: 154 MG/DL
CO2 SERPL-SCNC: 27 MMOL/L (ref 21–32)
CREAT SERPL-MCNC: 0.52 MG/DL (ref 0.6–1.3)
EOSINOPHIL # BLD AUTO: 0.08 THOUSAND/ÂΜL (ref 0–0.61)
EOSINOPHIL NFR BLD AUTO: 1 % (ref 0–6)
ERYTHROCYTE [DISTWIDTH] IN BLOOD BY AUTOMATED COUNT: 13.3 % (ref 11.6–15.1)
GFR SERPL CREATININE-BSD FRML MDRD: 93 ML/MIN/1.73SQ M
GLUCOSE P FAST SERPL-MCNC: 93 MG/DL (ref 65–99)
HCT VFR BLD AUTO: 45.1 % (ref 34.8–46.1)
HDLC SERPL-MCNC: 64 MG/DL
HGB BLD-MCNC: 15 G/DL (ref 11.5–15.4)
IMM GRANULOCYTES # BLD AUTO: 0.02 THOUSAND/UL (ref 0–0.2)
IMM GRANULOCYTES NFR BLD AUTO: 0 % (ref 0–2)
LDLC SERPL CALC-MCNC: 70 MG/DL (ref 0–100)
LYMPHOCYTES # BLD AUTO: 1.16 THOUSANDS/ÂΜL (ref 0.6–4.47)
LYMPHOCYTES NFR BLD AUTO: 20 % (ref 14–44)
MCH RBC QN AUTO: 30.4 PG (ref 26.8–34.3)
MCHC RBC AUTO-ENTMCNC: 33.3 G/DL (ref 31.4–37.4)
MCV RBC AUTO: 91 FL (ref 82–98)
MONOCYTES # BLD AUTO: 0.55 THOUSAND/ÂΜL (ref 0.17–1.22)
MONOCYTES NFR BLD AUTO: 9 % (ref 4–12)
NEUTROPHILS # BLD AUTO: 4.06 THOUSANDS/ÂΜL (ref 1.85–7.62)
NEUTS SEG NFR BLD AUTO: 69 % (ref 43–75)
NONHDLC SERPL-MCNC: 90 MG/DL
NRBC BLD AUTO-RTO: 0 /100 WBCS
PLATELET # BLD AUTO: 306 THOUSANDS/UL (ref 149–390)
PMV BLD AUTO: 10.1 FL (ref 8.9–12.7)
POTASSIUM SERPL-SCNC: 3.7 MMOL/L (ref 3.5–5.3)
PROT SERPL-MCNC: 7.2 G/DL (ref 6.4–8.4)
RBC # BLD AUTO: 4.94 MILLION/UL (ref 3.81–5.12)
SODIUM SERPL-SCNC: 136 MMOL/L (ref 135–147)
TRIGL SERPL-MCNC: 100 MG/DL
TSH SERPL DL<=0.05 MIU/L-ACNC: 4.56 UIU/ML (ref 0.45–4.5)
WBC # BLD AUTO: 5.91 THOUSAND/UL (ref 4.31–10.16)

## 2023-03-01 RX ORDER — ANASTROZOLE 1 MG/1
TABLET ORAL
Qty: 90 TABLET | Refills: 1 | Status: SHIPPED | OUTPATIENT
Start: 2023-03-01

## 2023-03-07 ENCOUNTER — OFFICE VISIT (OUTPATIENT)
Dept: FAMILY MEDICINE CLINIC | Facility: CLINIC | Age: 78
End: 2023-03-07

## 2023-03-07 VITALS
DIASTOLIC BLOOD PRESSURE: 88 MMHG | WEIGHT: 141 LBS | OXYGEN SATURATION: 97 % | HEART RATE: 82 BPM | BODY MASS INDEX: 24.07 KG/M2 | HEIGHT: 64 IN | SYSTOLIC BLOOD PRESSURE: 148 MMHG

## 2023-03-07 DIAGNOSIS — E03.9 ACQUIRED HYPOTHYROIDISM: ICD-10-CM

## 2023-03-07 DIAGNOSIS — C50.812 MALIGNANT NEOPLASM OF OVERLAPPING SITES OF LEFT BREAST IN FEMALE, ESTROGEN RECEPTOR POSITIVE (HCC): ICD-10-CM

## 2023-03-07 DIAGNOSIS — Z79.811 USE OF ANASTROZOLE (ARIMIDEX): ICD-10-CM

## 2023-03-07 DIAGNOSIS — M85.88 OSTEOPENIA OF LUMBAR SPINE: ICD-10-CM

## 2023-03-07 DIAGNOSIS — Z17.0 MALIGNANT NEOPLASM OF OVERLAPPING SITES OF LEFT BREAST IN FEMALE, ESTROGEN RECEPTOR POSITIVE (HCC): ICD-10-CM

## 2023-03-07 DIAGNOSIS — K57.90 DIVERTICULOSIS: ICD-10-CM

## 2023-03-07 DIAGNOSIS — E78.2 MIXED HYPERLIPIDEMIA: ICD-10-CM

## 2023-03-07 DIAGNOSIS — I10 PRIMARY HYPERTENSION: Primary | ICD-10-CM

## 2023-03-07 DIAGNOSIS — D47.3 ESSENTIAL (HEMORRHAGIC) THROMBOCYTHEMIA (HCC): ICD-10-CM

## 2023-03-07 DIAGNOSIS — R73.9 HYPERGLYCEMIA: ICD-10-CM

## 2023-03-07 NOTE — PROGRESS NOTES
Name: Latia Freire      : 1945      MRN: [id number]  Encounter Provider: Bre Sandoval MD  Encounter Date: 3/7/2023   Encounter department: [facility] PRIMARY CARE    Assessment & Plan     1  Primary hypertension  Assessment & Plan:  Blood pressure at the office was slightly elevated, but she did bring in a significant amount of her home blood pressures, all of which were quite good, including yesterday morning  No specific changes based on that  Continue on hydrochlorothiazide  Orders:  -     Comprehensive metabolic panel; Future; Expected date: 2023  -     TSH, 3rd generation; Future; Expected date: 2023  -     CBC and differential; Future; Expected date: 2023    2  Mixed hyperlipidemia  Assessment & Plan:  Cholesterol was excellent  Continue with pravastatin 40  Check in 6 months  Orders:  -     Cholesterol, total; Future; Expected date: 2023  -     Comprehensive metabolic panel; Future; Expected date: 2023  -     HDL cholesterol; Future; Expected date: 2023  -     LDL cholesterol, direct; Future; Expected date: 2023    3  Malignant neoplasm of overlapping sites of left breast in female, estrogen receptor positive [facility])  Assessment & Plan:  Patient will continue to follow with hematology/oncology  Surgery itself on the breast was 9 years ago, therefore she does not necessarily need to follow with surgical oncology as often  Patient is currently on anastrozole  The plan is currently to complete 10 years of therapy  This should be completed in approximately 2 years  Follow-up with hematology/oncology  4  Essential (hemorrhagic) thrombocythemia ([facility] [address] )  Assessment & Plan:  Noted in the past   Currently, platelet count was normal     Orders:  -     CBC and differential; Future; Expected date: 2023    5  Use of anastrozole (Arimidex)    6  Osteopenia of lumbar spine    7   Diverticulosis  Assessment & Plan:  History of diverticulosis  No specific changes  8  Acquired hypothyroidism  Assessment & Plan:  Patient is currently on Synthroid 50 mcg  No specific changes recommended  Check labs in the future  Has up and then down TSH  Orders:  -     TSH, 3rd generation; Future; Expected date: 09/07/2023    9  Hyperglycemia  Assessment & Plan:  Hyperglycemia was noted previously, but the patient's blood sugar was normal     Orders:  -     Comprehensive metabolic panel; Future; Expected date: 09/07/2023           Subjective      Chief Complaint   Patient presents with   • Follow-up     Patient present today for her 6 month follow up  Pt will like to know is she needs to continue doing her yearly GYN exams, pt sees a surgeon and oncologist ever since she had he breast problem and will like to know if Dr Camilo Gaxiola can follow up with her instead of her having to go with all these doctors because she is worries there is going to be a time when she is not going to be able to drive herself there, and lastly does she need to take any extra supplement she should be taking  Patient is here for multiple issues  Please see the chief complaint  Breast cancer: Patient did have surgery approximately 9 years ago  Currently on anastrozole from hematology/oncology  Initially the thought was to complete anastrozole for 5 years, but she is now on it for 10  This will be coming due to end in the near future  Continue follow-up with hematology as planned  Thrombocythemia: Noted at 1 point, but her platelet count was reviewed today  Hyperlipidemia: Currently on pravastatin  Hypertension: She does report that in the office she notes higher blood pressures  Because of this, she did bring in a list of home blood pressures, which were actually quite good  Currently on hydrochlorothiazide  Review of Systems   Constitutional: Negative  HENT: Negative  Eyes: Negative  Respiratory: Negative      Cardiovascular: Negative  Gastrointestinal: Negative  Endocrine: Negative  Genitourinary: Negative  Musculoskeletal: Negative  Skin: Negative  Allergic/Immunologic: Negative  Neurological: Negative  Hematological: Negative  Psychiatric/Behavioral: Negative  Current Outpatient Medications on File Prior to Visit   Medication Sig   • anastrozole (ARIMIDEX) 1 mg tablet TAKE 1 TABLET BY MOUTH EVERY DAY   • aspirin (ECOTRIN LOW STRENGTH) 81 mg EC tablet Take 81 mg by mouth daily   • Calcium Carbonate-Vitamin D 600-400 MG-UNIT per tablet Take 2 tablets by mouth daily   • hydrochlorothiazide (MICROZIDE) 12 5 mg capsule TAKE 1 CAPSULE BY MOUTH EVERY DAY   • levothyroxine 50 mcg tablet TAKE 1 TABLET BY MOUTH EVERY DAY   • Multiple Vitamin (MULTIVITAMINS PO) Take by mouth   • pravastatin (PRAVACHOL) 40 mg tablet TAKE 1 TABLET BY MOUTH EVERY DAY       Objective     Sodium 136, potassium 3 7, calcium 9 7  Blood sugar 93  Creatinine 0 52, GFR 93  AST 23, ALT 22  Total cholesterol 154, LDL 70, HDL 64, triglycerides 100  TSH 4 56  White count 5 91, hemoglobin 15 0, hematocrit 45 1, platelets 999  /88 (BP Location: Right arm, Patient Position: Sitting, Cuff Size: Large)   Pulse 82   Ht 5' 3 75" (1 619 m)   Wt 64 kg (141 lb)   SpO2 97%   BMI 24 39 kg/m²     Physical Exam  Vitals and nursing note reviewed  Constitutional:       Appearance: She is well-developed  HENT:      Head: Normocephalic and atraumatic  Cardiovascular:      Rate and Rhythm: Normal rate and regular rhythm  Pulses:           Carotid pulses are 2+ on the right side and 2+ on the left side  Heart sounds: Normal heart sounds  Pulmonary:      Effort: Pulmonary effort is normal       Breath sounds: Normal breath sounds  No wheezing or rales  Chest:      Chest wall: No tenderness         Blanca Degroot MD

## 2023-03-07 NOTE — ASSESSMENT & PLAN NOTE
Patient is currently on Synthroid 50 mcg  No specific changes recommended  Check labs in the future  Has up and then down TSH

## 2023-03-07 NOTE — ASSESSMENT & PLAN NOTE
Blood pressure at the office was slightly elevated, but she did bring in a significant amount of her home blood pressures, all of which were quite good, including yesterday morning  No specific changes based on that  Continue on hydrochlorothiazide

## 2023-03-07 NOTE — ASSESSMENT & PLAN NOTE
Patient will continue to follow with hematology/oncology  Surgery itself on the breast was 9 years ago, therefore she does not necessarily need to follow with surgical oncology as often  Patient is currently on anastrozole  The plan is currently to complete 10 years of therapy  This should be completed in approximately 2 years  Follow-up with hematology/oncology

## 2023-03-07 NOTE — PATIENT INSTRUCTIONS
1  Primary hypertension  Assessment & Plan:  Blood pressure at the office was slightly elevated, but she did bring in a significant amount of her home blood pressures, all of which were quite good, including yesterday morning  No specific changes based on that  Continue on hydrochlorothiazide  Orders:  -     Comprehensive metabolic panel; Future; Expected date: 09/07/2023  -     TSH, 3rd generation; Future; Expected date: 09/07/2023  -     CBC and differential; Future; Expected date: 09/07/2023    2  Mixed hyperlipidemia  Assessment & Plan:  Cholesterol was excellent  Continue with pravastatin 40  Check in 6 months  Orders:  -     Cholesterol, total; Future; Expected date: 09/07/2023  -     Comprehensive metabolic panel; Future; Expected date: 09/07/2023  -     HDL cholesterol; Future; Expected date: 09/07/2023  -     LDL cholesterol, direct; Future; Expected date: 09/07/2023    3  Malignant neoplasm of overlapping sites of left breast in female, estrogen receptor positive [facility])  Assessment & Plan:  Patient will continue to follow with hematology/oncology  Surgery itself on the breast was 9 years ago, therefore she does not necessarily need to follow with surgical oncology as often  Patient is currently on anastrozole  The plan is currently to complete 10 years of therapy  This should be completed in approximately 2 years  Follow-up with hematology/oncology  4  Essential (hemorrhagic) thrombocythemia ([address] )  Assessment & Plan:  Noted in the past   Currently, platelet count was normal     Orders:  -     CBC and differential; Future; Expected date: 09/07/2023    5  Use of anastrozole (Arimidex)    6  Osteopenia of lumbar spine    7  Diverticulosis  Assessment & Plan:  History of diverticulosis  No specific changes  8  Acquired hypothyroidism  Assessment & Plan:  Patient is currently on Synthroid 50 mcg  No specific changes recommended  Check labs in the future    Has up and then down TSH       Orders:  -     TSH, 3rd generation; Future; Expected date: 09/07/2023    9  Hyperglycemia  Assessment & Plan:  Hyperglycemia was noted previously, but the patient's blood sugar was normal     Orders:  -     Comprehensive metabolic panel; Future; Expected date: 09/07/2023        COVID 19 Instructions    Chaz Mccray was advised to limit contact with others to essential tasks such as getting food, medications, and medical care  Proper handwashing reviewed, and Hand sanitzer when washing is not available  If the patient develops symptoms of COVID 19, the patient should call the office as soon as possible  For [id number] Flu season, it is strongly recommended that Flu Vaccinations be obtained  Virtual Visits:  [place]: This works on smart phones (any phone with Internet browsing capability)  You should get a text message when the provider is ready to see you  Click on the link in the text message, and the call should start  You will need to type in your name, and allow camera and microphone access  This is HIPPA compliant, and secure  If you have not already done so, get immunized to COVID 19  We are committed to getting you vaccinated as soon as possible and will be closely following CDC and [facility]  guidelines as they are released and revised  Please refer to our COVID-19 vaccine webpage for the most up to date information on the vaccine and our distribution efforts  This site will also have the most up to date recommendations for COVID booster vaccine  Malinda collado    Call [phone] ([phone]), option 7    OUR NEW LOCATION:    [address], [address] [place], [place] [address] , [place], [address]  Fax: [phone]    Lab services and OB/GYN are at this location as well

## 2023-05-18 ENCOUNTER — ANNUAL EXAM (OUTPATIENT)
Dept: GYNECOLOGY | Facility: CLINIC | Age: 78
End: 2023-05-18

## 2023-05-18 VITALS
BODY MASS INDEX: 24.56 KG/M2 | HEIGHT: 63 IN | DIASTOLIC BLOOD PRESSURE: 90 MMHG | WEIGHT: 138.6 LBS | HEART RATE: 79 BPM | SYSTOLIC BLOOD PRESSURE: 120 MMHG

## 2023-05-18 DIAGNOSIS — Z01.419 ENCOUNTER FOR GYNECOLOGICAL EXAMINATION WITH PAPANICOLAOU SMEAR OF CERVIX: ICD-10-CM

## 2023-05-18 DIAGNOSIS — Z17.0 MALIGNANT NEOPLASM OF OVERLAPPING SITES OF LEFT BREAST IN FEMALE, ESTROGEN RECEPTOR POSITIVE (HCC): ICD-10-CM

## 2023-05-18 DIAGNOSIS — Z13.820 ENCOUNTER FOR SCREENING FOR OSTEOPOROSIS: ICD-10-CM

## 2023-05-18 DIAGNOSIS — M85.80 OSTEOPENIA, UNSPECIFIED LOCATION: ICD-10-CM

## 2023-05-18 DIAGNOSIS — Z78.0 MENOPAUSE: Primary | ICD-10-CM

## 2023-05-18 DIAGNOSIS — C50.812 MALIGNANT NEOPLASM OF OVERLAPPING SITES OF LEFT BREAST IN FEMALE, ESTROGEN RECEPTOR POSITIVE (HCC): ICD-10-CM

## 2023-05-18 NOTE — PROGRESS NOTES
Assessment/Plan:         Diagnoses and all orders for this visit:    Menopause  -     DXA bone density spine hip and pelvis; Future    Malignant neoplasm of overlapping sites of left breast in female, estrogen receptor positive ([address] )    Osteopenia, unspecified location  -     DXA bone density spine hip and pelvis; Future    Encounter for screening for osteoporosis  -     DXA bone density spine hip and pelvis; Future    Encounter for gynecological examination with Papanicolaou smear of cervix  -     Liquid-based pap, screening        Subjective:      Patient ID: Hayder Perez is a 69 y o  female  HPI patient presents for GYN exam   She has a history of stage IIb ER/CO positive left breast cancer diagnosed in 2014  Status post left mastectomy  She denies any vaginal irritation, burning, discharge or bleeding  Denies any dysuria, hematuria urgency or urinary incontinence  No GI complaints  Colonoscopy 2019  Normal     DEXA scan May 2021  Normal    The following portions of the patient's history were reviewed and updated as appropriate:   She  has a past medical history of Abnormal findings on diagnostic imaging of breast, 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 [facility]), Disease of thyroid gland, Fibroadenoma of right breast, History of chemotherapy (2014), radiation therapy, Hypertension, Hypokalemia, Long term use of drug, and Overweight (3/26/2020)    She   Patient Active Problem List    Diagnosis Date Noted   • Essential (hemorrhagic) thrombocythemia ([address] ) 02/21/2022   • Aspirin long-term use 03/26/2020   • Osteopenia 12/23/2019   • Diverticulosis 07/29/2019   • Occult blood in stools 06/21/2019   • Breast cancer screening, high risk patient 04/03/2019   • Myalgia 11/30/2018   • Chronic pain of both knees 05/23/2018   • Malignant neoplasm of overlapping sites of left breast in female, estrogen receptor positive ([address] )    • Use of anastrozole (Arimidex)    • Menopause 2017   • Hyperglycemia 2014   • Hyperlipidemia 2012   • Hypertension 2012   • Hypothyroidism 2012     She  has a past surgical history that includes [place] lymph node biopsy (Left, 2014); Portacath placement (2014); Tonsillectomy (195);  section; Tubal ligation; Lymphadenectomy (Left, 2014); IVC filter retrieval (2014); Breast biopsy (Left, 2014); Breast biopsy (Right, 2015); and Mastectomy (Left, 2014)  Her family history includes BRCA1 Negative in her sister; Breast cancer (age of onset: 76) in her sister; Coronary artery disease in her mother; Diabetes in her mother; Heart failure in her mother; No Known Problems in her daughter, father, maternal aunt, maternal grandfather, maternal grandmother, paternal grandfather, and paternal grandmother  She  reports that she has never smoked  She has never used smokeless tobacco  She reports current alcohol use of about 2 0 - 3 0 standard drinks per week  She reports that she does not use drugs  Current Outpatient Medications   Medication Sig Dispense Refill   • anastrozole (ARIMIDEX) 1 mg tablet TAKE 1 TABLET BY MOUTH EVERY DAY 90 tablet 1   • Calcium Carbonate-Vitamin D 600-400 MG-UNIT per tablet Take 2 tablets by mouth daily     • hydrochlorothiazide (MICROZIDE) 12 5 mg capsule TAKE 1 CAPSULE BY MOUTH EVERY DAY 90 capsule 3   • levothyroxine 50 mcg tablet TAKE 1 TABLET BY MOUTH EVERY DAY 90 tablet 3   • Multiple Vitamin (MULTIVITAMINS PO) Take by mouth     • pravastatin (PRAVACHOL) 40 mg tablet TAKE 1 TABLET BY MOUTH EVERY DAY 90 tablet 3   • aspirin (ECOTRIN LOW STRENGTH) 81 mg EC tablet Take 81 mg by mouth daily (Patient not taking: Reported on 2023)       No current facility-administered medications for this visit       Current Outpatient Medications on File Prior to Visit   Medication Sig   • anastrozole (ARIMIDEX) 1 mg tablet TAKE 1 TABLET BY MOUTH EVERY DAY   • Calcium "Carbonate-Vitamin D 600-400 MG-UNIT per tablet Take 2 tablets by mouth daily   • hydrochlorothiazide (MICROZIDE) 12 5 mg capsule TAKE 1 CAPSULE BY MOUTH EVERY DAY   • levothyroxine 50 mcg tablet TAKE 1 TABLET BY MOUTH EVERY DAY   • Multiple Vitamin (MULTIVITAMINS PO) Take by mouth   • pravastatin (PRAVACHOL) 40 mg tablet TAKE 1 TABLET BY MOUTH EVERY DAY   • aspirin (ECOTRIN LOW STRENGTH) 81 mg EC tablet Take 81 mg by mouth daily (Patient not taking: Reported on 5/18/2023)     No current facility-administered medications on file prior to visit  She is allergic to mushroom extract complex - food allergy       Review of Systems   Constitutional: Negative  HENT: Negative for sore throat and trouble swallowing  Gastrointestinal: Negative  Genitourinary: Negative  Objective:      /90   Pulse 79   Ht 5' 3\" (1 6 m)   Wt 62 9 kg (138 lb 9 6 oz)   BMI 24 55 kg/m²          Physical Exam  Vitals reviewed  Constitutional:       Appearance: Normal appearance  She is normal weight  Cardiovascular:      Rate and Rhythm: Normal rate and regular rhythm  Pulses: Normal pulses  Heart sounds: Normal heart sounds  No murmur heard  Pulmonary:      Effort: Pulmonary effort is normal  No respiratory distress  Breath sounds: Normal breath sounds  Chest:   Breasts:     Right: No swelling, bleeding, inverted nipple, mass, nipple discharge, skin change or tenderness  Left: Absent  Abdominal:      General: There is no distension  Palpations: Abdomen is soft  There is no mass  Tenderness: There is no abdominal tenderness  There is no guarding or rebound  Hernia: No hernia is present  There is no hernia in the left inguinal area or right inguinal area  Genitourinary:     General: Normal vulva  Labia:         Right: No rash, tenderness or lesion  Left: No rash, tenderness or lesion         Comments: Uterus anteverted normal size and contour freely mobile and " nontender  No palpable adnexal masses or tenderness  Vagina with atrophic changes  [place]'s and [place]'s glands normal   Urethral orifice normal   No cystocele or rectocele  Musculoskeletal:      Cervical back: Normal range of motion and neck supple  No tenderness  Lymphadenopathy:      Cervical: No cervical adenopathy  Upper Body:      Right upper body: No supraclavicular, axillary or pectoral adenopathy  Left upper body: No supraclavicular, axillary or pectoral adenopathy  Lower Body: No right inguinal adenopathy  No left inguinal adenopathy  Neurological:      Mental Status: She is alert

## 2023-05-19 ENCOUNTER — TELEPHONE (OUTPATIENT)
Dept: HEMATOLOGY ONCOLOGY | Facility: CLINIC | Age: 78
End: 2023-05-19

## 2023-05-19 NOTE — TELEPHONE ENCOUNTER
Patient Call    Who are you speaking with? Patient    If it is not the patient, are they listed on an active communication consent form? N/A   What is the reason for this call? Patient states she has a Mammo next month 06/19/2023 @10AM     The patient would like to know if she needs a script or not for her Mammo  Does this require a call back? Yes   If a call back is required, please list best call back number [phone]   If a call back is required, advise that a message will be forwarded to their care team and someone will return their call as soon as possible  Did you relay this information to the patient?  Yes

## 2023-05-26 LAB
LAB AP GYN PRIMARY INTERPRETATION: NORMAL
Lab: NORMAL

## 2023-06-13 DIAGNOSIS — C50.919 MALIGNANT NEOPLASM OF BREAST IN FEMALE, ESTROGEN RECEPTOR POSITIVE, UNSPECIFIED LATERALITY, UNSPECIFIED SITE OF BREAST (HCC): ICD-10-CM

## 2023-06-13 DIAGNOSIS — I10 ESSENTIAL HYPERTENSION: ICD-10-CM

## 2023-06-13 DIAGNOSIS — E78.2 MIXED HYPERLIPIDEMIA: ICD-10-CM

## 2023-06-13 DIAGNOSIS — Z17.0 MALIGNANT NEOPLASM OF BREAST IN FEMALE, ESTROGEN RECEPTOR POSITIVE, UNSPECIFIED LATERALITY, UNSPECIFIED SITE OF BREAST (HCC): ICD-10-CM

## 2023-06-13 DIAGNOSIS — E03.9 ACQUIRED HYPOTHYROIDISM: ICD-10-CM

## 2023-06-13 RX ORDER — PRAVASTATIN SODIUM 40 MG
TABLET ORAL
Qty: 90 TABLET | Refills: 3 | Status: SHIPPED | OUTPATIENT
Start: 2023-06-13

## 2023-06-13 RX ORDER — LEVOTHYROXINE SODIUM 0.05 MG/1
TABLET ORAL
Qty: 90 TABLET | Refills: 3 | Status: SHIPPED | OUTPATIENT
Start: 2023-06-13

## 2023-06-13 RX ORDER — HYDROCHLOROTHIAZIDE 12.5 MG/1
CAPSULE, GELATIN COATED ORAL
Qty: 90 CAPSULE | Refills: 3 | Status: SHIPPED | OUTPATIENT
Start: 2023-06-13

## 2023-06-13 RX ORDER — ANASTROZOLE 1 MG/1
TABLET ORAL
Qty: 90 TABLET | Refills: 3 | Status: SHIPPED | OUTPATIENT
Start: 2023-06-13

## 2023-06-19 ENCOUNTER — HOSPITAL ENCOUNTER (OUTPATIENT)
Dept: MAMMOGRAPHY | Facility: MEDICAL CENTER | Age: 78
Discharge: HOME | End: 2023-06-19
Payer: MEDICARE

## 2023-06-19 VITALS — HEIGHT: 63 IN | BODY MASS INDEX: 24.57 KG/M2 | WEIGHT: 138.67 LBS

## 2023-06-19 DIAGNOSIS — Z12.31 ENCOUNTER FOR SCREENING MAMMOGRAM FOR MALIGNANT NEOPLASM OF BREAST: ICD-10-CM

## 2023-06-19 PROCEDURE — 77063 BREAST TOMOSYNTHESIS BI: CPT

## 2023-06-19 PROCEDURE — 77067 SCR MAMMO BI INCL CAD: CPT

## 2023-07-26 ENCOUNTER — HOSPITAL ENCOUNTER (OUTPATIENT)
Dept: BONE DENSITY | Facility: MEDICAL CENTER | Age: 78
Discharge: HOME | End: 2023-07-26
Payer: MEDICARE

## 2023-07-26 DIAGNOSIS — M85.80 OSTEOPENIA, UNSPECIFIED LOCATION: ICD-10-CM

## 2023-07-26 DIAGNOSIS — Z13.820 ENCOUNTER FOR SCREENING FOR OSTEOPOROSIS: ICD-10-CM

## 2023-07-26 DIAGNOSIS — Z78.0 MENOPAUSE: ICD-10-CM

## 2023-07-26 PROCEDURE — 77080 DXA BONE DENSITY AXIAL: CPT

## 2023-07-31 ENCOUNTER — OFFICE VISIT (OUTPATIENT)
Dept: SURGICAL ONCOLOGY | Facility: CLINIC | Age: 78
End: 2023-07-31
Payer: MEDICARE

## 2023-07-31 VITALS
BODY MASS INDEX: 24.17 KG/M2 | HEART RATE: 85 BPM | WEIGHT: 136.4 LBS | HEIGHT: 63 IN | SYSTOLIC BLOOD PRESSURE: 130 MMHG | TEMPERATURE: 97.8 F | OXYGEN SATURATION: 98 % | DIASTOLIC BLOOD PRESSURE: 90 MMHG

## 2023-07-31 DIAGNOSIS — Z17.0 MALIGNANT NEOPLASM OF OVERLAPPING SITES OF LEFT BREAST IN FEMALE, ESTROGEN RECEPTOR POSITIVE (HCC): Primary | ICD-10-CM

## 2023-07-31 DIAGNOSIS — C50.812 MALIGNANT NEOPLASM OF OVERLAPPING SITES OF LEFT BREAST IN FEMALE, ESTROGEN RECEPTOR POSITIVE (HCC): Primary | ICD-10-CM

## 2023-07-31 DIAGNOSIS — Z12.31 SCREENING MAMMOGRAM FOR BREAST CANCER: ICD-10-CM

## 2023-07-31 DIAGNOSIS — Z79.811 USE OF ANASTROZOLE (ARIMIDEX): ICD-10-CM

## 2023-07-31 PROCEDURE — 99214 OFFICE O/P EST MOD 30 MIN: CPT | Performed by: SURGERY

## 2023-07-31 NOTE — PROGRESS NOTES
Surgical Oncology Follow Up       [facility] SURGICAL ONCOLOGY [address] [address]    Esauen Plant  1945  [id number]  [facility] SURGICAL ONCOLOGY [facility] [address] [address]    Chief Complaint   Patient presents with   • Follow-up       Assessment/Plan   Diagnoses and all orders for this visit:    Malignant neoplasm of overlapping sites of left breast in female, estrogen receptor positive ([address])    Use of anastrozole (Arimidex)    Screening mammogram for breast cancer  -     Mammo screening right w 3d & cad; Future        Advance Care Planning/Advance Directives:  Discussed disease status, cancer treatment plans and/or cancer treatment goals with the patient. Oncology History:    Oncology History Overview Note         Malignant neoplasm of overlapping sites of left breast in female, estrogen receptor positive ([address])    Initial Diagnosis    Malignant neoplasm of left breast in female, estrogen receptor positive ([address])     3/19/2014 Surgery    Left breast US guided core biopsy,  [facility].     4/14/2014 Surgery    Left breast mastectomy, SLNB, AND     10/29/2014 - 12/8/2014 Radiation    Mastectomy scar/left chest wall and the left supraclavicular fossa: 50.4Gy in 28 daily 180cGy fractions. Dr Rajesh Montana. 2014 -  Chemotherapy    05/2014  Dose dense AC X 4 cycles. 07/2014  Paclitaxel X 12. Herceptin. Dr Maria Elena Hogan. Hormone Therapy    Anastrazole. Dr Maria Elena Hogan         History of Present Illness: Breast cancer follow-up, continues on anastrozole, no concerns today  -Interval History: Recent contralateral mammogram    Review of Systems:  Review of Systems   Constitutional: Negative. Negative for appetite change and fever. Eyes: Negative. Respiratory: Negative for shortness of breath. Cardiovascular: Negative. Gastrointestinal: Negative. Endocrine: Negative.     Genitourinary: Negative. Musculoskeletal: Negative. Negative for arthralgias and myalgias. Skin: Negative. Allergic/Immunologic: Negative. Neurological: Negative. Hematological: Negative. Negative for adenopathy. Does not bruise/bleed easily. Psychiatric/Behavioral: Negative.         Patient Active Problem List   Diagnosis   • Malignant neoplasm of overlapping sites of left breast in female, estrogen receptor positive ([address])   • Use of anastrozole (Arimidex)   • Hyperglycemia   • Hyperlipidemia   • Hypertension   • Hypothyroidism   • Menopause   • Chronic pain of both knees   • Myalgia   • Screening mammogram for breast cancer   • Occult blood in stools   • Diverticulosis   • Osteopenia   • Aspirin long-term use   • Essential (hemorrhagic) thrombocythemia (HCC)     Past Medical History:   Diagnosis Date   • Abnormal findings on diagnostic imaging of breast     last assessed 3/19/14   • 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 ([address])     last assessed 10/2/15   • Disease of thyroid gland    • Fibroadenoma of right breast    • History of chemotherapy    • Hx of radiation therapy     last assessed 17   • Hypertension    • Hypokalemia     last assessed 4/15/16   • Long term use of drug     herceptin,last assessed 17   • Overweight 3/26/2020     Past Surgical History:   Procedure Laterality Date   • BREAST BIOPSY Left 2014    [facility]   • BREAST BIOPSY Right 2015    FIBROADENOMA   •  SECTION      X2   • IVC FILTER RETRIEVAL  2014    central iv line with subcutaneous reservoir mediaport   • LYMPHADENECTOMY Left 2014    axxillary   • MASTECTOMY Left 2014   • PORTACATH PLACEMENT  2014   • SENTINEL LYMPH NODE BIOPSY Left 2014    AND   • TONSILLECTOMY     • TUBAL LIGATION       Family History   Problem Relation Age of Onset   • Heart failure Mother    • Coronary artery disease Mother    • Diabetes Mother    • No Known Problems Father    • Breast cancer Sister 76   • BRCA1 Negative Sister    • No Known Problems Daughter    • No Known Problems Maternal Grandmother    • No Known Problems Maternal Grandfather    • No Known Problems Paternal Grandmother    • No Known Problems Paternal Grandfather    • No Known Problems Maternal Aunt      Social History     Socioeconomic History   • Marital status: /Civil Union     Spouse name: Not on file   • Number of children: Not on file   • Years of education: Not on file   • Highest education level: Not on file   Occupational History   • Occupation: retired   Tobacco Use   • Smoking status: Never   • Smokeless tobacco: Never   • Tobacco comments:     no secondhand smoe exposure   Vaping Use   • Vaping Use: Never used   Substance and Sexual Activity   • Alcohol use: Yes     Alcohol/week: 2.0 - 3.0 standard drinks of alcohol     Types: 2 - 3 Glasses of wine per week     Comment: social   • Drug use: No   • Sexual activity: Not Currently     Birth control/protection: Post-menopausal   Other Topics Concern   • Not on file   Social History Narrative    Exercises daily     Social Determinants of Health     Financial Resource Strain: Low Risk  (8/29/2022)    Overall Financial Resource Strain (CARDIA)    • Difficulty of Paying Living Expenses: Not hard at all   Food Insecurity: Not on file   Transportation Needs: No Transportation Needs (8/29/2022)    PRAPARE - Transportation    • Lack of Transportation (Medical): No    • Lack of Transportation (Non-Medical):  No   Physical Activity: Not on file   Stress: Not on file   Social Connections: Not on file   Intimate Partner Violence: Not on file   Housing Stability: Not on file       Current Outpatient Medications:   •  anastrozole (ARIMIDEX) 1 mg tablet, TAKE 1 TABLET BY MOUTH EVERY DAY, Disp: 90 tablet, Rfl: 3  •  Calcium Carbonate-Vitamin D 600-400 MG-UNIT per tablet, Take 2 tablets by mouth daily, Disp: , Rfl:   •  hydrochlorothiazide (MICROZIDE) 12.5 mg capsule, TAKE 1 CAPSULE BY MOUTH EVERY DAY, Disp: 90 capsule, Rfl: 3  •  levothyroxine 50 mcg tablet, TAKE 1 TABLET BY MOUTH EVERY DAY, Disp: 90 tablet, Rfl: 3  •  Multiple Vitamin (MULTIVITAMINS PO), Take by mouth, Disp: , Rfl:   •  pravastatin (PRAVACHOL) 40 mg tablet, TAKE 1 TABLET BY MOUTH EVERY DAY, Disp: 90 tablet, Rfl: 3  •  aspirin (ECOTRIN LOW STRENGTH) 81 mg EC tablet, Take 81 mg by mouth daily (Patient not taking: Reported on 5/18/2023), Disp: , Rfl:   Allergies   Allergen Reactions   • Mushroom Extract Complex - Food Allergy Hives       The following portions of the patient's history were reviewed and updated as appropriate: allergies, current medications, past family history, past medical history, past social history, past surgical history and problem list.        Vitals:    07/31/23 0940   BP: 130/90   Pulse: 85   Temp: 97.8 °F (36.6 °C)   SpO2: 98%       Physical Exam  Constitutional:       General: She is not in acute distress. Appearance: Normal appearance. She is well-developed. HENT:      Head: Normocephalic and atraumatic. Cardiovascular:      Heart sounds: Normal heart sounds. Pulmonary:      Breath sounds: Normal breath sounds. Chest:   Breasts:     Right: No inverted nipple, mass, nipple discharge, skin change or tenderness. Left: Skin change ( Mastectomy scar with radiation changes) present. No mass or tenderness. Comments: Tightness at the left pectoralis edge  Abdominal:      Palpations: Abdomen is soft. Lymphadenopathy:      Upper Body:      Right upper body: No supraclavicular, axillary or pectoral adenopathy. Left upper body: No supraclavicular, axillary or pectoral adenopathy. Neurological:      Mental Status: She is alert and oriented to person, place, and time.    Psychiatric:         Mood and Affect: Mood normal.           Results:  Labs:      Imaging  6/19/2023 right 3D screening mammogram was benign    I reviewed the above imaging data.    Discussion/Summary: 45-year-old female status post left modified radical mastectomy along with adjuvant chemo, Herceptin, postmastectomy radiation and continues on anastrozole. She is 9 years out now from her surgery. There is no evidence of disease based on exam.  Her recent contralateral mammogram was benign. I will therefore see her again next year following her mammogram.  She will be 11 years at that time and can opt to continue with us routinely or, on an as-needed basis.

## 2023-09-07 ENCOUNTER — APPOINTMENT (OUTPATIENT)
Dept: LAB | Facility: CLINIC | Age: 78
End: 2023-09-07
Payer: MEDICARE

## 2023-09-07 DIAGNOSIS — D47.3 ESSENTIAL (HEMORRHAGIC) THROMBOCYTHEMIA (HCC): ICD-10-CM

## 2023-09-07 DIAGNOSIS — E78.2 MIXED HYPERLIPIDEMIA: ICD-10-CM

## 2023-09-07 DIAGNOSIS — I10 PRIMARY HYPERTENSION: ICD-10-CM

## 2023-09-07 DIAGNOSIS — E03.9 ACQUIRED HYPOTHYROIDISM: ICD-10-CM

## 2023-09-07 DIAGNOSIS — R73.9 HYPERGLYCEMIA: ICD-10-CM

## 2023-09-07 LAB
ALBUMIN SERPL BCP-MCNC: 4.3 G/DL (ref 3.5–5)
ALP SERPL-CCNC: 56 U/L (ref 34–104)
ALT SERPL W P-5'-P-CCNC: 16 U/L (ref 7–52)
ANION GAP SERPL CALCULATED.3IONS-SCNC: 8 MMOL/L
AST SERPL W P-5'-P-CCNC: 19 U/L (ref 13–39)
BASOPHILS # BLD AUTO: 0.04 THOUSANDS/ÂΜL (ref 0–0.1)
BASOPHILS NFR BLD AUTO: 1 % (ref 0–1)
BILIRUB SERPL-MCNC: 0.71 MG/DL (ref 0.2–1)
BUN SERPL-MCNC: 13 MG/DL (ref 5–25)
CALCIUM SERPL-MCNC: 9.5 MG/DL (ref 8.4–10.2)
CHLORIDE SERPL-SCNC: 102 MMOL/L (ref 96–108)
CHOLEST SERPL-MCNC: 143 MG/DL
CO2 SERPL-SCNC: 27 MMOL/L (ref 21–32)
CREAT SERPL-MCNC: 0.48 MG/DL (ref 0.6–1.3)
EOSINOPHIL # BLD AUTO: 0.09 THOUSAND/ÂΜL (ref 0–0.61)
EOSINOPHIL NFR BLD AUTO: 1 % (ref 0–6)
ERYTHROCYTE [DISTWIDTH] IN BLOOD BY AUTOMATED COUNT: 13.2 % (ref 11.6–15.1)
GFR SERPL CREATININE-BSD FRML MDRD: 94 ML/MIN/1.73SQ M
GLUCOSE P FAST SERPL-MCNC: 100 MG/DL (ref 65–99)
HCT VFR BLD AUTO: 46.2 % (ref 34.8–46.1)
HDLC SERPL-MCNC: 62 MG/DL
HGB BLD-MCNC: 15.3 G/DL (ref 11.5–15.4)
IMM GRANULOCYTES # BLD AUTO: 0.02 THOUSAND/UL (ref 0–0.2)
IMM GRANULOCYTES NFR BLD AUTO: 0 % (ref 0–2)
LDLC SERPL DIRECT ASSAY-MCNC: 70 MG/DL (ref 0–100)
LYMPHOCYTES # BLD AUTO: 1.23 THOUSANDS/ÂΜL (ref 0.6–4.47)
LYMPHOCYTES NFR BLD AUTO: 19 % (ref 14–44)
MCH RBC QN AUTO: 30.6 PG (ref 26.8–34.3)
MCHC RBC AUTO-ENTMCNC: 33.1 G/DL (ref 31.4–37.4)
MCV RBC AUTO: 92 FL (ref 82–98)
MONOCYTES # BLD AUTO: 0.55 THOUSAND/ÂΜL (ref 0.17–1.22)
MONOCYTES NFR BLD AUTO: 8 % (ref 4–12)
NEUTROPHILS # BLD AUTO: 4.62 THOUSANDS/ÂΜL (ref 1.85–7.62)
NEUTS SEG NFR BLD AUTO: 71 % (ref 43–75)
NRBC BLD AUTO-RTO: 0 /100 WBCS
PLATELET # BLD AUTO: 352 THOUSANDS/UL (ref 149–390)
PMV BLD AUTO: 10.5 FL (ref 8.9–12.7)
POTASSIUM SERPL-SCNC: 4.1 MMOL/L (ref 3.5–5.3)
PROT SERPL-MCNC: 7.2 G/DL (ref 6.4–8.4)
RBC # BLD AUTO: 5 MILLION/UL (ref 3.81–5.12)
SODIUM SERPL-SCNC: 137 MMOL/L (ref 135–147)
TSH SERPL DL<=0.05 MIU/L-ACNC: 2.44 UIU/ML (ref 0.45–4.5)
WBC # BLD AUTO: 6.55 THOUSAND/UL (ref 4.31–10.16)

## 2023-09-07 PROCEDURE — 83718 ASSAY OF LIPOPROTEIN: CPT

## 2023-09-07 PROCEDURE — 84443 ASSAY THYROID STIM HORMONE: CPT

## 2023-09-07 PROCEDURE — 82465 ASSAY BLD/SERUM CHOLESTEROL: CPT

## 2023-09-07 PROCEDURE — 36415 COLL VENOUS BLD VENIPUNCTURE: CPT

## 2023-09-07 PROCEDURE — 83721 ASSAY OF BLOOD LIPOPROTEIN: CPT

## 2023-09-07 PROCEDURE — 80053 COMPREHEN METABOLIC PANEL: CPT

## 2023-09-07 PROCEDURE — 85025 COMPLETE CBC W/AUTO DIFF WBC: CPT

## 2023-09-20 ENCOUNTER — TELEPHONE (OUTPATIENT)
Dept: HEMATOLOGY ONCOLOGY | Facility: CLINIC | Age: 78
End: 2023-09-20

## 2023-09-20 NOTE — TELEPHONE ENCOUNTER
DAKOTA  DR renard GUZMAN   Who are you speaking with? Patient   If it is not the patient, are they listed on an active communication consent form? N/A   Is this a DAKOTA or DR renard GUZMAN DAKOTA   Which provider is patient currently scheduled or established with? Dr. Ilir Green   What is the original appointment date and time? 10/2/23 10:40AM   At which location is the appointment scheduled to take place? [facility]   Which provider is the patient transitioning care to? Kusum Clancy PA-C   What is the new appointment date and time? 10/18/23 10:00AM   At which location is the new appointment scheduled to take place? [facility]   What is the reason for this change?  Provider

## 2023-09-21 ENCOUNTER — RA CDI HCC (OUTPATIENT)
Dept: OTHER | Facility: HOSPITAL | Age: 78
End: 2023-09-21

## 2023-09-28 ENCOUNTER — OFFICE VISIT (OUTPATIENT)
Dept: FAMILY MEDICINE CLINIC | Facility: CLINIC | Age: 78
End: 2023-09-28
Payer: MEDICARE

## 2023-09-28 VITALS
DIASTOLIC BLOOD PRESSURE: 84 MMHG | SYSTOLIC BLOOD PRESSURE: 132 MMHG | HEART RATE: 93 BPM | HEIGHT: 63 IN | BODY MASS INDEX: 23.39 KG/M2 | OXYGEN SATURATION: 97 % | WEIGHT: 132 LBS

## 2023-09-28 DIAGNOSIS — Z78.0 POST-MENOPAUSE: ICD-10-CM

## 2023-09-28 DIAGNOSIS — Z17.0 MALIGNANT NEOPLASM OF OVERLAPPING SITES OF LEFT BREAST IN FEMALE, ESTROGEN RECEPTOR POSITIVE: ICD-10-CM

## 2023-09-28 DIAGNOSIS — Z23 NEEDS FLU SHOT: Primary | ICD-10-CM

## 2023-09-28 DIAGNOSIS — C50.812 MALIGNANT NEOPLASM OF OVERLAPPING SITES OF LEFT BREAST IN FEMALE, ESTROGEN RECEPTOR POSITIVE: ICD-10-CM

## 2023-09-28 DIAGNOSIS — D47.3 ESSENTIAL (HEMORRHAGIC) THROMBOCYTHEMIA (HCC): ICD-10-CM

## 2023-09-28 DIAGNOSIS — M85.88 OSTEOPENIA OF LUMBAR SPINE: ICD-10-CM

## 2023-09-28 DIAGNOSIS — Z79.811 USE OF ANASTROZOLE (ARIMIDEX): ICD-10-CM

## 2023-09-28 DIAGNOSIS — E03.9 ACQUIRED HYPOTHYROIDISM: ICD-10-CM

## 2023-09-28 DIAGNOSIS — R73.9 HYPERGLYCEMIA: ICD-10-CM

## 2023-09-28 DIAGNOSIS — E78.2 MIXED HYPERLIPIDEMIA: ICD-10-CM

## 2023-09-28 DIAGNOSIS — I10 PRIMARY HYPERTENSION: ICD-10-CM

## 2023-09-28 PROBLEM — Z12.31 SCREENING MAMMOGRAM FOR BREAST CANCER: Status: RESOLVED | Noted: 2019-04-03 | Resolved: 2023-09-28

## 2023-09-28 PROCEDURE — G0008 ADMIN INFLUENZA VIRUS VAC: HCPCS | Performed by: FAMILY MEDICINE

## 2023-09-28 PROCEDURE — G0439 PPPS, SUBSEQ VISIT: HCPCS | Performed by: FAMILY MEDICINE

## 2023-09-28 PROCEDURE — 90662 IIV NO PRSV INCREASED AG IM: CPT | Performed by: FAMILY MEDICINE

## 2023-09-28 PROCEDURE — 99214 OFFICE O/P EST MOD 30 MIN: CPT | Performed by: FAMILY MEDICINE

## 2023-09-28 NOTE — PATIENT INSTRUCTIONS
1. Needs flu shot  -     FLUZONE HIGH-DOSE: influenza vaccine, high-dose, preservative-free 0.7 mL    2. Primary hypertension  Assessment & Plan:  Blood pressure is doing extremely well at this point. Currently on hydrochlorothiazide. No other medications are needed. Orders:  -     Comprehensive metabolic panel; Future; Expected date: 03/28/2024  -     TSH, 3rd generation; Future; Expected date: 03/28/2024    3. Acquired hypothyroidism  Assessment & Plan: On Synthroid 50. TSH normal.  Check 6 months. Orders:  -     TSH, 3rd generation; Future; Expected date: 03/28/2024    4. Mixed hyperlipidemia  Assessment & Plan:  Fantastic. Continue pravastatin. No change. Check in 6 months. Orders:  -     Comprehensive metabolic panel; Future; Expected date: 03/28/2024  -     Lipid panel; Future; Expected date: 03/28/2024    5. Hyperglycemia  Assessment & Plan:  Blood sugar was only minimally elevated. Reviewed about carbohydrates. Patient does understand this. We will check again in 6 months. 6. Use of anastrozole (Arimidex)  Assessment & Plan:  Continue to follow with oncology. No change. Continues on anastrozole. Orders:  -     DXA bone density spine hip and pelvis; Future; Expected date: 09/28/2025    7. Malignant neoplasm of overlapping sites of left breast in female, estrogen receptor positive [facility])  Assessment & Plan:  Follow with hematology/oncology. She reports her primary oncologist has now retired. She will be getting another through [facility]. 8. Osteopenia of lumbar spine  Assessment & Plan:  Patient does have osteopenia. Last DEXA was 2023. FRAX score was 2.3 and 12, which means that she should concentrate on exercise, calcium supplementation with vitamin D supplementation, and recheck in 2 years. No medications are recommended based on that. Orders:  -     DXA bone density spine hip and pelvis; Future; Expected date: 09/28/2025    9.  Essential (hemorrhagic) thrombocythemia ([address])  Assessment & Plan:  Platelet count was normal.  Continue to follow. Recheck in 6 months. 10. Post-menopause  -     DXA bone density spine hip and pelvis; Future; Expected date: 09/28/2025        COVID 19 Instructions    Daniella Rubin was advised to limit contact with others to essential tasks such as getting food, medications, and medical care. Proper handwashing reviewed, and Hand sanitzer when washing is not available. If the patient develops symptoms of COVID 19, the patient should call the office as soon as possible. For 4982-3187 Flu season, it is strongly recommended that Flu Vaccinations be obtained. Virtual Visits:  AmWell: This works on smart phones (any phone with Internet browsing capability). You should get a text message when the provider is ready to see you. Click on the link in the text message, and the call should start. You will need to type in your name, and allow camera and microphone access. This is HIPPA compliant, and secure. If you have not already done so, get immunized to COVID 19. We are committed to getting you vaccinated as soon as possible and will be closely following CDC and [facility] guidelines as they are released and revised. Please refer to our COVID-19 vaccine webpage for the most up to date information on the vaccine and our distribution efforts. This site will also have the most up to date recommendations for COVID booster vaccine. [email or web address]    Call [phone] ([phone]), option 7    OUR NEW LOCATION:    [facility]  [address] [facility], [address] [facility], [address] [place] [address], [address] [place] [address]  Fax: [phone]    Lab services and OB/GYN are at this location as well.

## 2023-09-28 NOTE — ASSESSMENT & PLAN NOTE
Follow with hematology/oncology. She reports her primary oncologist has now retired. She will be getting another through [facility].

## 2023-09-28 NOTE — ASSESSMENT & PLAN NOTE
Patient does have osteopenia. Last DEXA was 2023. FRAX score was 2.3 and 12, which means that she should concentrate on exercise, calcium supplementation with vitamin D supplementation, and recheck in 2 years. No medications are recommended based on that.

## 2023-09-28 NOTE — PROGRESS NOTES
Assessment and Plan:     1. Needs flu shot  -     FLUZONE HIGH-DOSE: influenza vaccine, high-dose, preservative-free 0.7 mL    2. Primary hypertension  Assessment & Plan:  Blood pressure is doing extremely well at this point. Currently on hydrochlorothiazide. No other medications are needed. Orders:  -     Comprehensive metabolic panel; Future; Expected date: 03/28/2024  -     TSH, 3rd generation; Future; Expected date: 03/28/2024    3. Acquired hypothyroidism  Assessment & Plan: On Synthroid 50. TSH normal.  Check 6 months. Orders:  -     TSH, 3rd generation; Future; Expected date: 03/28/2024    4. Mixed hyperlipidemia  Assessment & Plan:  Fantastic. Continue pravastatin. No change. Check in 6 months. Orders:  -     Comprehensive metabolic panel; Future; Expected date: 03/28/2024  -     Lipid panel; Future; Expected date: 03/28/2024    5. Hyperglycemia  Assessment & Plan:  Blood sugar was only minimally elevated. Reviewed about carbohydrates. Patient does understand this. We will check again in 6 months. 6. Use of anastrozole (Arimidex)  Assessment & Plan:  Continue to follow with oncology. No change. Continues on anastrozole. Orders:  -     DXA bone density spine hip and pelvis; Future; Expected date: 09/28/2025    7. Malignant neoplasm of overlapping sites of left breast in female, estrogen receptor positive [facility])  Assessment & Plan:  Follow with hematology/oncology. She reports her primary oncologist has now retired. She will be getting another through [facility]. 8. Osteopenia of lumbar spine  Assessment & Plan:  Patient does have osteopenia. Last DEXA was 2023. FRAX score was 2.3 and 12, which means that she should concentrate on exercise, calcium supplementation with vitamin D supplementation, and recheck in 2 years. No medications are recommended based on that. Orders:  -     DXA bone density spine hip and pelvis; Future; Expected date: 09/28/2025    9.  Essential (hemorrhagic) thrombocythemia ([address])  Assessment & Plan:  Platelet count was normal.  Continue to follow. Recheck in 6 months. 10. Post-menopause  -     DXA bone density spine hip and pelvis; Future; Expected date: 09/28/2025          Depression Screening and Follow-up Plan: Patient was screened for depression during today's encounter. They screened negative with a PHQ-2 score of 0. Preventive health issues were discussed with patient, and age appropriate screening tests were ordered as noted in patient's After Visit Summary. Personalized health advice and appropriate referrals for health education or preventive services given if needed, as noted in patient's After Visit Summary. Chief Complaint   Patient presents with   • Follow-up     Patient present today for her 6 month follow up. • Medicare Wellness Visit     Pt due        History of Present Illness:     Patient presents for a Medicare Wellness Visit    Patient is here to follow-up on several issues. No specific problems or concerns that she has at the moment. She does continue to follow with radiation and hematology/oncology, as well as surgical oncology. Patient is continuing on Arimidex. No specific concerns with that. Hypertension: Currently on hydrochlorothiazide. Hyperlipidemia on pravastatin and doing quite well. Reviewed labs. Hypothyroid: On Synthroid 50 mcg. Patient Care Team:  Mala Figueroa MD as PCP - General  MD Dimple Troncoso, MD Helga Nash Asp, MD [address], MD (Radiation Oncology)     Review of Systems:     Review of Systems   Constitutional: Negative. HENT: Negative. Eyes: Negative. Respiratory: Negative. Cardiovascular: Negative. Gastrointestinal: Negative. Endocrine: Negative. Genitourinary: Negative. Musculoskeletal: Negative. Skin: Negative. Allergic/Immunologic: Negative. Neurological: Negative. Hematological: Negative. Psychiatric/Behavioral: Negative.          Problem List:     Patient Active Problem List   Diagnosis   • Malignant neoplasm of overlapping sites of left breast in female, estrogen receptor positive ([address])   • Use of anastrozole (Arimidex)   • Hyperglycemia   • Hyperlipidemia   • Hypertension   • Hypothyroidism   • Menopause   • Chronic pain of both knees   • Myalgia   • Occult blood in stools   • Diverticulosis   • Osteopenia   • Aspirin long-term use   • Essential (hemorrhagic) thrombocythemia (H[facility])      Past Medical and Surgical History:     Past Medical History:   Diagnosis Date   • Abnormal findings on diagnostic imaging of breast     last assessed 3/19/14   • 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 ([address])     last assessed 10/2/15   • Disease of thyroid gland    • Fibroadenoma of right breast    • History of chemotherapy    • Hx of radiation therapy     last assessed 17   • Hypertension    • Hypokalemia     last assessed 4/15/16   • Long term use of drug     herceptin,last assessed 17   • Overweight 3/26/2020     Past Surgical History:   Procedure Laterality Date   • BREAST BIOPSY Left 2014    [facility]   • BREAST BIOPSY Right 2015    FIBROADENOMA   •  SECTION      X2   • IVC FILTER RETRIEVAL  2014    central iv line with subcutaneous reservoir mediaport   • LYMPHADENECTOMY Left 2014    axxillary   • MASTECTOMY Left 2014   • PORTACATH PLACEMENT  2014   • SENTINEL LYMPH NODE BIOPSY Left 2014    AND   • TONSILLECTOMY     • TUBAL LIGATION        Family History:     Family History   Problem Relation Age of Onset   • Heart failure Mother    • Coronary artery disease Mother    • Diabetes Mother    • No Known Problems Father    • Breast cancer Sister 76   • BRCA1 Negative Sister    • No Known Problems Daughter    • No Known Problems Maternal Grandmother    • No Known Problems Maternal Grandfather • No Known Problems Paternal Grandmother    • No Known Problems Paternal Grandfather    • No Known Problems Maternal Aunt       Social History:     Social History     Socioeconomic History   • Marital status: /Civil Union     Spouse name: None   • Number of children: None   • Years of education: None   • Highest education level: None   Occupational History   • Occupation: retired   Tobacco Use   • Smoking status: Never   • Smokeless tobacco: Never   • Tobacco comments:     no secondhand smoe exposure   Vaping Use   • Vaping Use: Never used   Substance and Sexual Activity   • Alcohol use: Yes     Alcohol/week: 2.0 - 3.0 standard drinks of alcohol     Types: 2 - 3 Glasses of wine per week     Comment: social   • Drug use: No   • Sexual activity: Not Currently     Birth control/protection: Post-menopausal   Other Topics Concern   • None   Social History Narrative    Exercises daily     Social Determinants of Health     Financial Resource Strain: Low Risk  (9/28/2023)    Overall Financial Resource Strain (CARDIA)    • Difficulty of Paying Living Expenses: Not hard at all   Food Insecurity: Not on file   Transportation Needs: No Transportation Needs (9/28/2023)    PRAPARE - Transportation    • Lack of Transportation (Medical): No    • Lack of Transportation (Non-Medical):  No   Physical Activity: Not on file   Stress: Not on file   Social Connections: Not on file   Intimate Partner Violence: Not on file   Housing Stability: Not on file      Medications and Allergies:     Current Outpatient Medications   Medication Sig Dispense Refill   • anastrozole (ARIMIDEX) 1 mg tablet TAKE 1 TABLET BY MOUTH EVERY DAY 90 tablet 3   • Calcium Carbonate-Vitamin D 600-400 MG-UNIT per tablet Take 2 tablets by mouth daily     • hydrochlorothiazide (MICROZIDE) 12.5 mg capsule TAKE 1 CAPSULE BY MOUTH EVERY DAY 90 capsule 3   • levothyroxine 50 mcg tablet TAKE 1 TABLET BY MOUTH EVERY DAY 90 tablet 3   • Multiple Vitamin (MULTIVITAMINS PO) Take by mouth     • pravastatin (PRAVACHOL) 40 mg tablet TAKE 1 TABLET BY MOUTH EVERY DAY 90 tablet 3   • aspirin (ECOTRIN LOW STRENGTH) 81 mg EC tablet Take 81 mg by mouth daily (Patient not taking: Reported on 5/18/2023)       No current facility-administered medications for this visit. Allergies   Allergen Reactions   • Mushroom Extract Complex - Food Allergy Hives      Immunizations:     Immunization History   Administered Date(s) Administered   • COVID-19 MODERNA VACC 0.5 ML IM 02/19/2021, 03/26/2021, 11/08/2021, 04/13/2022   • COVID-19 Moderna Vac BIVALENT 12 Yr+ IM 0.5 ML 10/04/2022   • H1N1, All Formulations 01/09/2010   • Influenza Split High Dose Preservative Free IM 10/02/2015, 10/14/2016, 11/15/2017   • Influenza, high dose seasonal 0.7 mL 10/26/2018, 11/07/2019, 10/10/2020, 10/19/2021, 09/28/2023   • Influenza, seasonal, injectable 1945, 11/02/2014   • Pneumococcal Conjugate 13-Valent 10/14/2016   • Pneumococcal Polysaccharide PPV23 10/27/2011   • Tdap 1945   • Zoster 10/02/2013      Health Maintenance:         Topic Date Due   • Hepatitis C Screening  Never done   • Breast Cancer Screening: Mammogram  06/19/2024   • Cervical Cancer Screening  05/18/2025   • DXA SCAN  07/26/2028   • Colorectal Cancer Screening  Discontinued         Topic Date Due   • COVID-19 Vaccine (6 - Raymona Deed series) 02/04/2023      Medicare Screening Tests and Risk Assessments:     Brooke De Los Santos is here for her Subsequent Wellness visit. Health Risk Assessment:   Patient rates overall health as very good. Patient feels that their physical health rating is same. Patient is satisfied with their life. Eyesight was rated as slightly worse. Hearing was rated as same. Patient feels that their emotional and mental health rating is same. Patients states they are never, rarely angry. Patient states they are sometimes unusually tired/fatigued. Pain experienced in the last 7 days has been none.  Patient states that she has experienced no weight loss or gain in last 6 months. Depression Screening:   PHQ-2 Score: 0      Fall Risk Screening: In the past year, patient has experienced: no history of falling in past year      Urinary Incontinence Screening:   Patient has not leaked urine accidently in the last six months. Home Safety:  Patient has trouble with stairs inside or outside of their home. Patient has working smoke alarms and has working carbon monoxide detector. Home safety hazards include: none. Nutrition:   Current diet is Low Cholesterol, Low Saturated Fat, Low Carb and No Added Salt. Medications:   Patient is currently taking over-the-counter supplements. OTC medications include: see medication list. Patient is able to manage medications. Activities of Daily Living (ADLs)/Instrumental Activities of Daily Living (IADLs):   Walk and transfer into and out of bed and chair?: Yes  Dress and groom yourself?: Yes    Bathe or shower yourself?: Yes    Feed yourself? Yes  Do your laundry/housekeeping?: Yes  Manage your money, pay your bills and track your expenses?: Yes  Make your own meals?: Yes    Do your own shopping?: Yes    Previous Hospitalizations:   Any hospitalizations or ED visits within the last 12 months?: No      Advance Care Planning:   Living will: Yes    Durable POA for healthcare:  Yes    Advanced directive: Yes    Advanced directive counseling given: Yes    Five wishes given: No      Cognitive Screening:   Provider or family/friend/caregiver concerned regarding cognition?: No    PREVENTIVE SCREENINGS      Cardiovascular Screening:    General: Screening Not Indicated and History Lipid Disorder      Diabetes Screening:     General: Screening Current      Colorectal Cancer Screening:     General: Screening Not Indicated      Breast Cancer Screening:     General: History Breast Cancer      Cervical Cancer Screening:    General: Screening Not Indicated      Osteoporosis Screening:    General: Screening Current      Abdominal Aortic Aneurysm (AAA) Screening:        General: Screening Not Indicated      Lung Cancer Screening:     General: Screening Not Indicated      Hepatitis C Screening:    General: Screening Not Indicated    Screening, Brief Intervention, and Referral to Treatment (SBIRT)    Screening  Typical number of drinks in a day: 1  Typical number of drinks in a week: 7  Interpretation: Low risk drinking behavior. Single Item Drug Screening:  How often have you used an illegal drug (including marijuana) or a prescription medication for non-medical reasons in the past year? never    Single Item Drug Screen Score: 0  Interpretation: Negative screen for possible drug use disorder    No results found. White count 6.55, hemoglobin 15.3, hematocrit 46.2, platelets 648. Sodium 137, potassium 4.1, calcium 9.5. Blood sugar 100. Creatinine 0.48, GFR 94. AST 19, ALT 16. Total cholesterol 143. LDL 70. HDL 62. TSH 2.444. Physical Exam:     /84 (BP Location: Right arm, Patient Position: Sitting, Cuff Size: Standard)   Pulse 93   Ht 5' 3" (1.6 m)   Wt 59.9 kg (132 lb)   SpO2 97%   BMI 23.38 kg/m²     Physical Exam  Vitals and nursing note reviewed. Constitutional:       Appearance: Normal appearance. HENT:      Head: Normocephalic. Cardiovascular:      Rate and Rhythm: Normal rate and regular rhythm. Pulses: Normal pulses. Carotid pulses are 2+ on the right side and 2+ on the left side. Heart sounds: Normal heart sounds. No murmur heard. No friction rub. No gallop. Pulmonary:      Effort: Pulmonary effort is normal. No respiratory distress. Breath sounds: Normal breath sounds. No stridor. No wheezing, rhonchi or rales. Chest:      Chest wall: No tenderness. Neurological:      Mental Status: She is alert.           Charmayne Fruits, MD

## 2023-09-28 NOTE — ASSESSMENT & PLAN NOTE
Blood pressure is doing extremely well at this point. Currently on hydrochlorothiazide. No other medications are needed.

## 2023-09-28 NOTE — ASSESSMENT & PLAN NOTE
Blood sugar was only minimally elevated. Reviewed about carbohydrates. Patient does understand this. We will check again in 6 months.

## 2023-10-18 ENCOUNTER — OFFICE VISIT (OUTPATIENT)
Dept: HEMATOLOGY ONCOLOGY | Facility: CLINIC | Age: 78
End: 2023-10-18

## 2023-10-18 VITALS
RESPIRATION RATE: 16 BRPM | WEIGHT: 134 LBS | SYSTOLIC BLOOD PRESSURE: 126 MMHG | BODY MASS INDEX: 23.74 KG/M2 | HEART RATE: 79 BPM | TEMPERATURE: 98.5 F | DIASTOLIC BLOOD PRESSURE: 64 MMHG | OXYGEN SATURATION: 95 % | HEIGHT: 63 IN

## 2023-10-18 DIAGNOSIS — C50.812 MALIGNANT NEOPLASM OF OVERLAPPING SITES OF LEFT BREAST IN FEMALE, ESTROGEN RECEPTOR POSITIVE: Primary | ICD-10-CM

## 2023-10-18 DIAGNOSIS — Z17.0 MALIGNANT NEOPLASM OF OVERLAPPING SITES OF LEFT BREAST IN FEMALE, ESTROGEN RECEPTOR POSITIVE: Primary | ICD-10-CM

## 2023-10-18 NOTE — PROGRESS NOTES
Hematology/Oncology Outpatient Follow-up  Farhan Adamson 69 y.o. female 1945 [id number]    Date:  10/18/2023      Assessment and Plan:  1. Malignant neoplasm of overlapping sites of left breast in female, estrogen receptor positive   67-year-old female presents for follow-up. She was previously seen by Dr. Cat Azul. She remains on adjuvant anastrozole as below. She will be completed in April 2024. She has enough refills to get her through to this time. She will follow-up in 1 year. Continue follow-up with surgical oncology as well. HPI:  Oncology History Overview Note         Malignant neoplasm of overlapping sites of left breast in female, estrogen receptor positive     Initial Diagnosis    Malignant neoplasm of left breast in female, estrogen receptor positive ([address])     3/19/2014 Surgery    Left breast US guided core biopsy,  [facility].     4/14/2014 Surgery    Left breast mastectomy, SLNB, AND     10/29/2014 - 12/8/2014 Radiation    Mastectomy scar/left chest wall and the left supraclavicular fossa: 50.4Gy in 28 daily 180cGy fractions. Dr Maylin Urena. 2014 -  Chemotherapy    05/2014  Dose dense AC X 4 cycles. 07/2014  Paclitaxel X 12. Herceptin. Dr Cat Azul. Hormone Therapy    Anastrazole. Dr Cat Azul       67-year-old female presents for follow-up visit regarding history of breast cancer. Patient was diagnosed in March 2014 via ultrasound-guided biopsy. She had had a routine screening mammogram which showed abnormality of the left breast.    Biopsy showed invasive ductal carcinoma. She underwent mastectomy with axillary lymph node dissection on 4/4/2014 which showed 3.5 cm invasive ductal carcinoma, grade 3.  3 out of 11 nodes were positive for metastatic disease. This was ER/AL positive, HER2 2+ disease, HER2 FISH was equivocal; this was completed multiple times and each time noted to be equivocal therefore she was treated as if she it was positive.     She completed adjuvant chemotherapy with AC followed by Taxol and Herceptin with Herceptin monotherapy completing in 2015, without any cardiac history. She remains on adjuvant anastrozole. She was recommended to be on anastrozole for 10 years. Interval history: continues to remain active     ROS: Review of Systems   Constitutional:  Negative for activity change, appetite change, chills, fatigue, fever and unexpected weight change. Respiratory:  Negative for cough and shortness of breath. Cardiovascular:  Negative for chest pain, palpitations and leg swelling. Gastrointestinal:  Negative for abdominal pain, constipation, diarrhea, nausea and vomiting. Genitourinary:  Negative for difficulty urinating, dysuria and hematuria. Musculoskeletal:  Negative for arthralgias. Skin: Negative. Neurological:  Negative for dizziness, weakness, light-headedness, numbness and headaches. Hematological: Negative. Psychiatric/Behavioral: Negative.          Past Medical History:   Diagnosis Date    Abnormal findings on diagnostic imaging of breast     last assessed 3/19/14    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      last assessed 10/2/15    Disease of thyroid gland     Fibroadenoma of right breast     History of chemotherapy     Hx of radiation therapy     last assessed 17    Hypertension     Hypokalemia     last assessed 4/15/16    Long term use of drug     herceptin,last assessed 17    Overweight 3/26/2020       Past Surgical History:   Procedure Laterality Date    BREAST BIOPSY Left 2014    [facility]    BREAST BIOPSY Right 2015    FIBROADENOMA     SECTION      X2    IVC FILTER RETRIEVAL  2014    central iv line with subcutaneous reservoir mediaport    LYMPHADENECTOMY Left 2014    axxillary    MASTECTOMY Left 2014    PORTACATH PLACEMENT  2014    SENTINEL LYMPH NODE BIOPSY Left 2014    AND    TONSILLECTOMY      TUBAL LIGATION Social History     Socioeconomic History    Marital status: /Civil Union     Spouse name: None    Number of children: None    Years of education: None    Highest education level: None   Occupational History    Occupation: retired   Tobacco Use    Smoking status: Never     Passive exposure: Never    Smokeless tobacco: Never   Vaping Use    Vaping Use: Never used   Substance and Sexual Activity    Alcohol use: Yes     Alcohol/week: 2.0 - 3.0 standard drinks of alcohol     Types: 2 - 3 Glasses of wine per week     Comment: social    Drug use: No    Sexual activity: Not Currently     Birth control/protection: Post-menopausal   Other Topics Concern    None   Social History Narrative    Exercises daily     Social Determinants of Health     Financial Resource Strain: Low Risk  (9/28/2023)    Overall Financial Resource Strain (CARDIA)     Difficulty of Paying Living Expenses: Not hard at all   Food Insecurity: Not on file   Transportation Needs: No Transportation Needs (9/28/2023)    PRAPARE - Transportation     Lack of Transportation (Medical): No     Lack of Transportation (Non-Medical):  No   Physical Activity: Not on file   Stress: Not on file   Social Connections: Not on file   Intimate Partner Violence: Not on file   Housing Stability: Not on file       Family History   Problem Relation Age of Onset    Heart failure Mother     Coronary artery disease Mother     Diabetes Mother     No Known Problems Father     Breast cancer Sister 76    BRCA1 Negative Sister     No Known Problems Daughter     No Known Problems Maternal Grandmother     No Known Problems Maternal Grandfather     No Known Problems Paternal Grandmother     No Known Problems Paternal Grandfather     No Known Problems Maternal Aunt        Allergies   Allergen Reactions    Mushroom Extract Complex - Food Allergy Hives         Current Outpatient Medications:     anastrozole (ARIMIDEX) 1 mg tablet, TAKE 1 TABLET BY MOUTH EVERY DAY, Disp: 90 tablet, Rfl: 3    Calcium Carbonate-Vitamin D 600-400 MG-UNIT per tablet, Take 2 tablets by mouth daily, Disp: , Rfl:     hydrochlorothiazide (MICROZIDE) 12.5 mg capsule, TAKE 1 CAPSULE BY MOUTH EVERY DAY, Disp: 90 capsule, Rfl: 3    levothyroxine 50 mcg tablet, TAKE 1 TABLET BY MOUTH EVERY DAY, Disp: 90 tablet, Rfl: 3    Multiple Vitamin (MULTIVITAMINS PO), Take by mouth, Disp: , Rfl:     pravastatin (PRAVACHOL) 40 mg tablet, TAKE 1 TABLET BY MOUTH EVERY DAY, Disp: 90 tablet, Rfl: 3    aspirin (ECOTRIN LOW STRENGTH) 81 mg EC tablet, Take 81 mg by mouth daily (Patient not taking: Reported on 5/18/2023), Disp: , Rfl:     Physical Exam:  /64 (BP Location: Left arm, Patient Position: Sitting, Cuff Size: Adult)   Pulse 79   Temp 98.5 °F (36.9 °C) (Temporal)   Resp 16   Ht 5' 3" (1.6 m)   Wt 60.8 kg (134 lb)   SpO2 95%   BMI 23.74 kg/m²     Physical Exam  Vitals reviewed. Constitutional:       General: She is not in acute distress. Appearance: She is well-developed. She is not ill-appearing. HENT:      Head: Normocephalic and atraumatic. Eyes:      General: No scleral icterus. Conjunctiva/sclera: Conjunctivae normal.   Cardiovascular:      Rate and Rhythm: Normal rate and regular rhythm. Heart sounds: Normal heart sounds. No murmur heard. Pulmonary:      Effort: Pulmonary effort is normal. No respiratory distress. Breath sounds: Normal breath sounds. Chest:   Breasts:     Right: Normal. No inverted nipple, mass, skin change or tenderness. Comments: S/p left mastectomy; no chest wall lesions  Abdominal:      Palpations: Abdomen is soft. Tenderness: There is no abdominal tenderness. Musculoskeletal:         General: No tenderness. Normal range of motion. Cervical back: Normal range of motion and neck supple. Right lower leg: No edema. Left lower leg: No edema. Lymphadenopathy:      Cervical: No cervical adenopathy.       Upper Body:      Right upper body: No supraclavicular or axillary adenopathy. Left upper body: No supraclavicular or axillary adenopathy. Skin:     General: Skin is warm and dry. Neurological:      Mental Status: She is alert and oriented to person, place, and time. Cranial Nerves: No cranial nerve deficit. Psychiatric:         Mood and Affect: Mood normal.         Behavior: Behavior normal.       Labs:  Lab Results   Component Value Date    WBC 6.55 09/07/2023    HGB 15.3 09/07/2023    HCT 46.2 (H) 09/07/2023    MCV 92 09/07/2023     09/07/2023     I have spent 20 minutes with Patient  today in which greater than 50% of this time was spent in counseling/coordination of care regarding Diagnostic results, Risks and benefits of tx options, Instructions for management, Impressions, Documenting in the medical record, Reviewing / ordering tests, medicine, procedures  , and Obtaining or reviewing history  . Patient voiced understanding and agreement in the above discussion. Aware to contact our office with questions/symptoms in the interim. This note has been generated by voice recognition software system. Therefore, there may be spelling, grammar, and or syntax errors. Please contact if questions arise.

## 2023-10-30 ENCOUNTER — TELEPHONE (OUTPATIENT)
Dept: FAMILY MEDICINE CLINIC | Facility: CLINIC | Age: 78
End: 2023-10-30

## 2023-10-30 NOTE — TELEPHONE ENCOUNTER
The number is [phone]. Sharath Alexander and Deirdre Emanuel were inquiring about the booster shot for Coronavirus to see which one you have and if you have the [employer], we'd like to schedule it. The number again is [phone] and the name is Sharath Alexander and Scott Nielsen, Randa Mendoza and IRMA. Thank you. LM we are only giving out [employer] vaccine. Thank You

## 2024-03-19 ENCOUNTER — TELEPHONE (OUTPATIENT)
Age: 79
End: 2024-03-19

## 2024-03-19 NOTE — TELEPHONE ENCOUNTER
Pt would like a call back from one of the nurses in the office.  States she has a few questions, refused to elaborate any further.  Also, wanted the office to know her  Dagoberto is currently hospitalized (cancelled his 3/26 appt). She will be available this morning, prior to going to the hospital to visit her him.

## 2024-03-19 NOTE — TELEPHONE ENCOUNTER
Patient called back regarding her questions. Patient was asking if she should complete her lab work? I asked the patient to please complete the lab work.   Patient also had a question regarding a follow up appointment for her spouse. Patient also had a question regarding handicapped parking permit. I explained about the TCM visit. I also gave the patient information regarding where to obtain the forms.   Patient had additional questions regarding 911 transport. Patient was upset that her spouse was taken the [facility] when they called 911. I explained that EMS is required to take the patient to the closest facility. Patient verbalizing understanding. No further questions or concerns.

## 2024-03-25 ENCOUNTER — RA CDI HCC (OUTPATIENT)
Dept: OTHER | Facility: HOSPITAL | Age: 79
End: 2024-03-25

## 2024-03-28 ENCOUNTER — APPOINTMENT (OUTPATIENT)
Dept: LAB | Facility: CLINIC | Age: 79
End: 2024-03-28
Payer: MEDICARE

## 2024-03-28 ENCOUNTER — TELEPHONE (OUTPATIENT)
Dept: ADMINISTRATIVE | Facility: OTHER | Age: 79
End: 2024-03-28

## 2024-03-28 DIAGNOSIS — I10 PRIMARY HYPERTENSION: ICD-10-CM

## 2024-03-28 DIAGNOSIS — E03.9 ACQUIRED HYPOTHYROIDISM: ICD-10-CM

## 2024-03-28 DIAGNOSIS — E78.2 MIXED HYPERLIPIDEMIA: ICD-10-CM

## 2024-03-28 LAB
ALBUMIN SERPL BCP-MCNC: 3.9 G/DL (ref 3.5–5)
ALP SERPL-CCNC: 67 U/L (ref 34–104)
ALT SERPL W P-5'-P-CCNC: 19 U/L (ref 7–52)
ANION GAP SERPL CALCULATED.3IONS-SCNC: 11 MMOL/L (ref 4–13)
AST SERPL W P-5'-P-CCNC: 22 U/L (ref 13–39)
BILIRUB SERPL-MCNC: 0.62 MG/DL (ref 0.2–1)
BUN SERPL-MCNC: 15 MG/DL (ref 5–25)
CALCIUM SERPL-MCNC: 9.6 MG/DL (ref 8.4–10.2)
CHLORIDE SERPL-SCNC: 99 MMOL/L (ref 96–108)
CHOLEST SERPL-MCNC: 151 MG/DL
CO2 SERPL-SCNC: 29 MMOL/L (ref 21–32)
CREAT SERPL-MCNC: 0.36 MG/DL (ref 0.6–1.3)
GFR SERPL CREATININE-BSD FRML MDRD: 104 ML/MIN/1.73SQ M
GLUCOSE P FAST SERPL-MCNC: 93 MG/DL (ref 65–99)
HDLC SERPL-MCNC: 64 MG/DL
LDLC SERPL CALC-MCNC: 63 MG/DL (ref 0–100)
NONHDLC SERPL-MCNC: 87 MG/DL
POTASSIUM SERPL-SCNC: 4.4 MMOL/L (ref 3.5–5.3)
PROT SERPL-MCNC: 7 G/DL (ref 6.4–8.4)
SODIUM SERPL-SCNC: 139 MMOL/L (ref 135–147)
TRIGL SERPL-MCNC: 120 MG/DL
TSH SERPL DL<=0.05 MIU/L-ACNC: 3.58 UIU/ML (ref 0.45–4.5)

## 2024-03-28 PROCEDURE — 36415 COLL VENOUS BLD VENIPUNCTURE: CPT

## 2024-03-28 PROCEDURE — 80053 COMPREHEN METABOLIC PANEL: CPT

## 2024-03-28 PROCEDURE — 80061 LIPID PANEL: CPT

## 2024-03-28 PROCEDURE — 84443 ASSAY THYROID STIM HORMONE: CPT

## 2024-03-28 NOTE — TELEPHONE ENCOUNTER
03/28/24 3:56 PM    Patient contacted (left message) to bring Advance Directive, POLST, or Living Will document to next scheduled pcp visit.    Thank you.  Smitha Zaragoza MA  [facility]

## 2024-03-28 NOTE — TELEPHONE ENCOUNTER
Patient called back. Advised of message to bring in documents to PCP visit. Patient states that her  is currently in hospital and she does not have access to those documents.

## 2024-04-01 ENCOUNTER — OFFICE VISIT (OUTPATIENT)
Dept: FAMILY MEDICINE CLINIC | Facility: CLINIC | Age: 79
End: 2024-04-01
Payer: MEDICARE

## 2024-04-01 VITALS
HEART RATE: 83 BPM | HEIGHT: 64 IN | BODY MASS INDEX: 21.58 KG/M2 | DIASTOLIC BLOOD PRESSURE: 72 MMHG | WEIGHT: 126.4 LBS | OXYGEN SATURATION: 96 % | SYSTOLIC BLOOD PRESSURE: 130 MMHG

## 2024-04-01 DIAGNOSIS — C50.812 MALIGNANT NEOPLASM OF OVERLAPPING SITES OF LEFT BREAST IN FEMALE, ESTROGEN RECEPTOR POSITIVE (HCC): ICD-10-CM

## 2024-04-01 DIAGNOSIS — R73.9 HYPERGLYCEMIA: ICD-10-CM

## 2024-04-01 DIAGNOSIS — D47.3 ESSENTIAL (HEMORRHAGIC) THROMBOCYTHEMIA (HCC): ICD-10-CM

## 2024-04-01 DIAGNOSIS — Z79.811 USE OF ANASTROZOLE (ARIMIDEX): ICD-10-CM

## 2024-04-01 DIAGNOSIS — E78.2 MIXED HYPERLIPIDEMIA: ICD-10-CM

## 2024-04-01 DIAGNOSIS — I10 PRIMARY HYPERTENSION: Primary | ICD-10-CM

## 2024-04-01 DIAGNOSIS — Z17.0 MALIGNANT NEOPLASM OF OVERLAPPING SITES OF LEFT BREAST IN FEMALE, ESTROGEN RECEPTOR POSITIVE (HCC): ICD-10-CM

## 2024-04-01 DIAGNOSIS — E03.9 ACQUIRED HYPOTHYROIDISM: ICD-10-CM

## 2024-04-01 PROCEDURE — G2211 COMPLEX E/M VISIT ADD ON: HCPCS | Performed by: FAMILY MEDICINE

## 2024-04-01 PROCEDURE — 99214 OFFICE O/P EST MOD 30 MIN: CPT | Performed by: FAMILY MEDICINE

## 2024-04-01 NOTE — ASSESSMENT & PLAN NOTE
Follow with hematology/oncology.  They mention that she will be looking at potentially coming off of the anastrozole as she has already been on this for 10 years now.

## 2024-04-01 NOTE — PATIENT INSTRUCTIONS
1. Primary hypertension  Assessment & Plan:  Blood pressure quite good with hydrochlorothiazide.  No change.    Orders:  -     Comprehensive metabolic panel; Future; Expected date: 10/01/2024  -     CBC and differential; Future; Expected date: 10/01/2024    2. Mixed hyperlipidemia  Assessment & Plan:  Cholesterol outstanding.  Continue on pravastatin.  Check in 6 months.    Orders:  -     Cholesterol, total; Future; Expected date: 10/01/2024  -     Comprehensive metabolic panel; Future; Expected date: 10/01/2024  -     HDL cholesterol; Future; Expected date: 10/01/2024  -     LDL cholesterol, direct; Future; Expected date: 10/01/2024    3. Hyperglycemia  Assessment & Plan:  Blood sugar reasonable.  Continue to limit carbohydrates.    Orders:  -     Comprehensive metabolic panel; Future; Expected date: 10/01/2024    4. Malignant neoplasm of overlapping sites of left breast in female, estrogen receptor positive (HCC)  Assessment & Plan:  Continue with hematology/oncology.      5. Use of anastrozole (Arimidex)  Assessment & Plan:  Follow with hematology/oncology.  They mention that she will be looking at potentially coming off of the anastrozole as she has already been on this for 10 years now.      6. Essential (hemorrhagic) thrombocythemia (HCC)  Assessment & Plan:  Check in the future.  Ordered.    Orders:  -     CBC and differential; Future; Expected date: 10/01/2024    7. Acquired hypothyroidism  Assessment & Plan:  On Synthroid.  No change.  Continue.          COVID 19 Instructions    Romayne Geissenhainer was advised to limit contact with others to essential tasks such as getting food, medications, and medical care.    Proper handwashing reviewed, and Hand sanitzer when washing is not available.    If the patient develops symptoms of COVID 19, the patient should call the office as soon as possible.    It is strongly recommended that Flu Vaccinations be obtained.      Virtual Visits:  Rashaun: This works on smart  phones (any phone with Internet browsing capability).  You should get a text message when the provider is ready to see you.  Click on the link in the text message, and the call should start.  You will need to type in your name, and allow camera and microphone access.  This is HIPPA compliant, and secure.      If you have not already done so, get immunized to COVID 19.      We are committed to getting you vaccinated as soon as possible and will be closely following CDC and [facility] guidelines as they are released and revised.  Please refer to our COVID-19 vaccine webpage for the most up to date information on the vaccine and our distribution efforts.    This site will also have the most up to date recommendations for COVID booster vaccine.    https://[email or web address].org/covid-19/protect-[email or web address]-[email or web address]-vaccine    Call [phone] ([phone]), option 7    You can also visit https://www.vaccines.gov/ to find vaccines in your area.    OUR LOCATION:    [facility]  [address] [place] [address], [address]  [place], [place], [address] [phone]  Fax: [phone]    Lab services, Rheumatology, and OB/GYN are at this location as well.

## 2024-04-01 NOTE — PROGRESS NOTES
Name: Romayne Geissenhainer      : 1945      MRN: [id number]  Encounter Provider: Dejan Trevino MD  Encounter Date: 2024   Encounter department: [facility]    Assessment & Plan     1. Primary hypertension  -     Comprehensive metabolic panel; Future; Expected date: 10/01/2024  -     CBC and differential; Future; Expected date: 10/01/2024    2. Mixed hyperlipidemia  -     Cholesterol, total; Future; Expected date: 10/01/2024  -     Comprehensive metabolic panel; Future; Expected date: 10/01/2024  -     HDL cholesterol; Future; Expected date: 10/01/2024  -     LDL cholesterol, direct; Future; Expected date: 10/01/2024    3. Hyperglycemia  -     Comprehensive metabolic panel; Future; Expected date: 10/01/2024    4. Malignant neoplasm of overlapping sites of left breast in female, estrogen receptor positive (HCC)    5. Use of anastrozole (Arimidex)    6. Essential (hemorrhagic) thrombocythemia (HCC)  -     CBC and differential; Future; Expected date: 10/01/2024           Subjective      Chief Complaint   Patient presents with   • Follow-up     6m        is in hospital, so she is not eating or drinking well.  Has not had any answers so far for him.  He is moved to rehab as he was not getting up while in hospital.  He is going to move out of rehab soon.    Patient did her labs.    Currently on pravastatin.    Hyperglycemia: Noted before.  Reviewed blood sugar.    Hypertension: Reviewed blood pressure today.  Currently on hydrochlorothiazide    Breast cancer: History.    Currently on anastrozole, looking to talk to hematology soon to discuss whether or not she should continue.          Review of Systems    Active and Past History reviewed.  Allergies reviewed.    Current Outpatient Medications on File Prior to Visit   Medication Sig   • anastrozole (ARIMIDEX) 1 mg tablet TAKE 1 TABLET BY MOUTH EVERY DAY   • Calcium Carbonate-Vitamin D 600-400 MG-UNIT per tablet Take 2  "tablets by mouth daily   • hydrochlorothiazide (MICROZIDE) 12.5 mg capsule TAKE 1 CAPSULE BY MOUTH EVERY DAY   • levothyroxine 50 mcg tablet TAKE 1 TABLET BY MOUTH EVERY DAY   • Multiple Vitamin (MULTIVITAMINS PO) Take by mouth   • pravastatin (PRAVACHOL) 40 mg tablet TAKE 1 TABLET BY MOUTH EVERY DAY   • aspirin (ECOTRIN LOW STRENGTH) 81 mg EC tablet Take 81 mg by mouth daily (Patient not taking: Reported on 5/18/2023)       Objective     Data:  TSH 3.578.  Sodium 139, potassium 4.4, calcium 9.6.  Creatinine 0.36, .  AST 22, ALT 19.  Blood sugar 93.  Total cholesterol 151, LDL 63, HDL 64, triglycerides 120.    /72 (BP Location: Right arm, Patient Position: Sitting, Cuff Size: Standard)   Pulse 83   Ht 5' 3.5\" (1.613 m)   Wt 57.3 kg (126 lb 6.4 oz)   SpO2 96%   BMI 22.04 kg/m²     Physical Exam  Dejan Trevino MD  "

## 2024-06-24 ENCOUNTER — HOSPITAL ENCOUNTER (OUTPATIENT)
Dept: MAMMOGRAPHY | Facility: MEDICAL CENTER | Age: 79
Discharge: HOME | End: 2024-06-24
Payer: MEDICARE

## 2024-06-24 VITALS — BODY MASS INDEX: 21.57 KG/M2 | WEIGHT: 126.32 LBS | HEIGHT: 64 IN

## 2024-06-24 DIAGNOSIS — Z12.31 SCREENING MAMMOGRAM FOR BREAST CANCER: ICD-10-CM

## 2024-06-24 PROCEDURE — 77063 BREAST TOMOSYNTHESIS BI: CPT

## 2024-06-24 PROCEDURE — 77067 SCR MAMMO BI INCL CAD: CPT

## 2024-08-02 ENCOUNTER — OFFICE VISIT (OUTPATIENT)
Dept: SURGICAL ONCOLOGY | Facility: CLINIC | Age: 79
End: 2024-08-02
Payer: MEDICARE

## 2024-08-02 VITALS
WEIGHT: 119.6 LBS | TEMPERATURE: 97.3 F | OXYGEN SATURATION: 96 % | SYSTOLIC BLOOD PRESSURE: 124 MMHG | HEIGHT: 64 IN | HEART RATE: 84 BPM | BODY MASS INDEX: 20.42 KG/M2 | RESPIRATION RATE: 16 BRPM | DIASTOLIC BLOOD PRESSURE: 86 MMHG

## 2024-08-02 DIAGNOSIS — Z79.811 USE OF ANASTROZOLE (ARIMIDEX): ICD-10-CM

## 2024-08-02 DIAGNOSIS — Z12.31 SCREENING MAMMOGRAM FOR BREAST CANCER: ICD-10-CM

## 2024-08-02 DIAGNOSIS — C50.812 MALIGNANT NEOPLASM OF OVERLAPPING SITES OF LEFT BREAST IN FEMALE, ESTROGEN RECEPTOR POSITIVE (HCC): Primary | ICD-10-CM

## 2024-08-02 DIAGNOSIS — Z17.0 MALIGNANT NEOPLASM OF OVERLAPPING SITES OF LEFT BREAST IN FEMALE, ESTROGEN RECEPTOR POSITIVE (HCC): Primary | ICD-10-CM

## 2024-08-02 PROCEDURE — 99214 OFFICE O/P EST MOD 30 MIN: CPT

## 2024-08-02 NOTE — PROGRESS NOTES
Surgical Oncology Follow Up       [address]  [facility] [place]  [address]  [facility] [address]    Romayne Geissenhainer  1945  [id number]  [address]  [facility]  [address] [place] [address]    Chief Complaint   Patient presents with   • Follow-up     Patient being seen for f/u. Last right screening mammo 6/24/2024.       Assessment/Plan:  1. Malignant neoplasm of overlapping sites of left breast in female, estrogen receptor positive (HCC)  - 1 year f/u    2. Use of anastrozole (Arimidex)  - continue use per medical oncology    3. Screening mammogram for breast cancer  - Mammo screening right w 3d & cad; Future       Discussion/Summary: Patient is a 79 year old female presenting for a 1 year f/u for hx of breast cancer in 2014. She is status post left modified radical mastectomy along with adjuvant chemo, Herceptin, postmastectomy radiation and continues on anastrozole for 10 years. She had a screening mammo of the right breast on 6/24/24 which was BIRADS 2 category 3 density. There were no concerns on her cbe. She would like to continue seeing us annually. She does not need a bra or prosthetic at this time. I will see the patient back in 1 year or sooner should the need arise. She was instructed to call with any questions or concerns prior to this time. All questions were answered today.        History of Present Illness:     Oncology History Overview Note         Malignant neoplasm of overlapping sites of left breast in female, estrogen receptor positive (HCC)   3/19/2014 Biopsy    Left breast ultrasound-guided biopsy  Invasive ductal carcinoma  Grade 2     4/14/2014 Surgery    Left breast modified radical mastectomy with SLNB (Dr. Marquez)  Invasive ductal carcinoma  Grade 3  3.5 cm  Margins negative  Lymphovascular invasion present  3/11 lymph nodes with macrometastasis  Repeated ; HI 10-15; HER2  2+, FISH borderline/equivocal    Path stage: pT2, pN1b, cM0     5/2014 - 2015 Chemotherapy    Dose dense AC X 4 cycles  7/2014  Paclitaxel X 12  Herceptin     9/2014 - 4/2024 Hormone Therapy    Anastrozole   Dr Mcginnis     10/29/2014 - 12/8/2014 Radiation    Mastectomy scar/left chest wall and the left supraclavicular fossa: 50.4Gy in 28 daily 180cGy fractions.   Dr Marshall.          -Interval History: Patient is a 79 year old female presenting for a 1 year f/u for hx of breast cancer in 2014.She continues on anastrozole for 10 years. She had a screening mammo of the right breast on 6/24/24 which was BIRADS 2 category 3 density.Patient denies changes on her breast exam. She denies persistent headaches, bone pain, back pain, shortness of breath, or abdominal pain.      Review of Systems:  Review of Systems   Constitutional:  Negative for activity change, appetite change, fatigue and unexpected weight change.   Respiratory:  Negative for cough and shortness of breath.    Cardiovascular:  Negative for chest pain.   Gastrointestinal:  Negative for abdominal pain, diarrhea, nausea and vomiting.   Endocrine: Negative for heat intolerance.   Musculoskeletal:  Negative for arthralgias, back pain and myalgias.   Skin:  Negative for rash.   Neurological:  Negative for weakness and headaches.   Hematological:  Negative for adenopathy.       Patient Active Problem List   Diagnosis   • Malignant neoplasm of overlapping sites of left breast in female, estrogen receptor positive (HCC)   • Use of anastrozole (Arimidex)   • Hyperglycemia   • Hyperlipidemia   • Hypertension   • Hypothyroidism   • Menopause   • Chronic pain of both knees   • Myalgia   • Occult blood in stools   • Diverticulosis   • Osteopenia   • Aspirin long-term use   • Essential (hemorrhagic) thrombocythemia (HCC)     Past Medical History:   Diagnosis Date   • Abnormal findings on diagnostic imaging of breast     last assessed 3/19/14   • Anemia due to chemotherapy  for breast cancer treated with erythropoietin (HCC)     last assessed 10/2/15   • Disease of thyroid gland    • Fibroadenoma of right breast    • History of chemotherapy    • Hx of radiation therapy     last assessed 17   • Hypertension    • Hypokalemia     last assessed 4/15/16   • Long term use of drug     herceptin,last assessed 17   • Overweight 3/26/2020     Past Surgical History:   Procedure Laterality Date   • BREAST BIOPSY Left 2014    IDC   • BREAST BIOPSY Right 2015    FIBROADENOMA   •  SECTION      X2   • IVC FILTER RETRIEVAL  2014    central iv line with subcutaneous reservoir mediaport   • LYMPHADENECTOMY Left 2014    axxillary   • MASTECTOMY Left 2014   • PORTACATH PLACEMENT  2014   • SENTINEL LYMPH NODE BIOPSY Left 2014    AND   • TONSILLECTOMY     • TUBAL LIGATION       Family History   Problem Relation Age of Onset   • Heart failure Mother    • Coronary artery disease Mother    • Diabetes Mother    • No Known Problems Father    • Breast cancer Sister 68   • BRCA1 Negative Sister    • No Known Problems Daughter    • No Known Problems Maternal Grandmother    • No Known Problems Maternal Grandfather    • No Known Problems Paternal Grandmother    • No Known Problems Paternal Grandfather    • No Known Problems Maternal Aunt      Social History     Socioeconomic History   • Marital status: /Civil Union     Spouse name: Not on file   • Number of children: Not on file   • Years of education: Not on file   • Highest education level: Not on file   Occupational History   • Occupation: retired   Tobacco Use   • Smoking status: Never     Passive exposure: Never   • Smokeless tobacco: Never   Vaping Use   • Vaping status: Never Used   Substance and Sexual Activity   • Alcohol use: Yes     Alcohol/week: 2.0 - 3.0 standard drinks of alcohol     Types: 2 - 3 Glasses of wine per week     Comment: social   • Drug use: No   • Sexual activity:  Not Currently     Birth control/protection: Post-menopausal   Other Topics Concern   • Not on file   Social History Narrative    Exercises daily     Social Determinants of Health     Financial Resource Strain: Low Risk  (9/28/2023)    Overall Financial Resource Strain (CARDIA)    • Difficulty of Paying Living Expenses: Not hard at all   Food Insecurity: Not on file   Transportation Needs: No Transportation Needs (9/28/2023)    PRAPARE - Transportation    • Lack of Transportation (Medical): No    • Lack of Transportation (Non-Medical): No   Physical Activity: Not on file   Stress: Not on file   Social Connections: Not on file   Intimate Partner Violence: Not on file   Housing Stability: Not on file       Current Outpatient Medications:   •  anastrozole (ARIMIDEX) 1 mg tablet, TAKE 1 TABLET BY MOUTH EVERY DAY, Disp: 90 tablet, Rfl: 3  •  Calcium Carbonate-Vitamin D 600-400 MG-UNIT per tablet, Take 2 tablets by mouth daily, Disp: , Rfl:   •  hydrochlorothiazide (MICROZIDE) 12.5 mg capsule, TAKE 1 CAPSULE BY MOUTH EVERY DAY, Disp: 90 capsule, Rfl: 3  •  levothyroxine 50 mcg tablet, TAKE 1 TABLET BY MOUTH EVERY DAY, Disp: 90 tablet, Rfl: 3  •  Multiple Vitamin (MULTIVITAMINS PO), Take by mouth, Disp: , Rfl:   •  pravastatin (PRAVACHOL) 40 mg tablet, TAKE 1 TABLET BY MOUTH EVERY DAY, Disp: 90 tablet, Rfl: 3  •  aspirin (ECOTRIN LOW STRENGTH) 81 mg EC tablet, Take 81 mg by mouth daily (Patient not taking: Reported on 5/18/2023), Disp: , Rfl:   Allergies   Allergen Reactions   • Mushroom Extract Complex - Food Allergy Hives     Vitals:    08/02/24 0912   BP: 124/86   Pulse: 84   Resp: 16   Temp: (!) 97.3 °F (36.3 °C)   SpO2: 96%       Physical Exam  Constitutional:       General: She is not in acute distress.     Appearance: Normal appearance.   Cardiovascular:      Rate and Rhythm: Normal rate and regular rhythm.      Pulses: Normal pulses.      Heart sounds: Normal heart sounds.   Pulmonary:      Effort: Pulmonary effort  is normal.      Breath sounds: Normal breath sounds.   Chest:      Chest wall: No mass.   Breasts:     Right: No swelling, bleeding, inverted nipple, mass, nipple discharge, skin change or tenderness.      Left: No swelling, bleeding, mass, skin change or tenderness.       Abdominal:      General: Abdomen is flat.      Palpations: Abdomen is soft.   Lymphadenopathy:      Upper Body:      Right upper body: No supraclavicular, axillary or pectoral adenopathy.      Left upper body: No supraclavicular, axillary or pectoral adenopathy.   Skin:     General: Skin is warm.   Neurological:      General: No focal deficit present.      Mental Status: She is alert and oriented to person, place, and time.   Psychiatric:         Mood and Affect: Mood normal.         Behavior: Behavior normal.           Results:    Imaging  No results found.    I reviewed the above imaging data.      Advance Care Planning/Advance Directives:  Discussed disease status, cancer treatment plans and/or cancer treatment goals with the patient.

## 2024-08-04 DIAGNOSIS — E78.2 MIXED HYPERLIPIDEMIA: ICD-10-CM

## 2024-08-04 RX ORDER — PRAVASTATIN SODIUM 40 MG
TABLET ORAL
Qty: 90 TABLET | Refills: 3 | Status: SHIPPED | OUTPATIENT
Start: 2024-08-04

## 2024-08-14 DIAGNOSIS — I10 ESSENTIAL HYPERTENSION: ICD-10-CM

## 2024-08-14 DIAGNOSIS — E03.9 ACQUIRED HYPOTHYROIDISM: ICD-10-CM

## 2024-08-14 RX ORDER — LEVOTHYROXINE SODIUM 50 UG/1
TABLET ORAL
Qty: 90 TABLET | Refills: 1 | Status: SHIPPED | OUTPATIENT
Start: 2024-08-14

## 2024-08-14 RX ORDER — HYDROCHLOROTHIAZIDE 12.5 MG/1
CAPSULE ORAL
Qty: 90 CAPSULE | Refills: 1 | Status: SHIPPED | OUTPATIENT
Start: 2024-08-14

## 2024-10-01 ENCOUNTER — APPOINTMENT (OUTPATIENT)
Dept: LAB | Facility: CLINIC | Age: 79
End: 2024-10-01
Payer: MEDICARE

## 2024-10-01 DIAGNOSIS — E78.2 MIXED HYPERLIPIDEMIA: ICD-10-CM

## 2024-10-01 DIAGNOSIS — I10 PRIMARY HYPERTENSION: ICD-10-CM

## 2024-10-01 DIAGNOSIS — R73.9 HYPERGLYCEMIA: ICD-10-CM

## 2024-10-01 DIAGNOSIS — D47.3 ESSENTIAL (HEMORRHAGIC) THROMBOCYTHEMIA (HCC): ICD-10-CM

## 2024-10-01 LAB
ALBUMIN SERPL BCG-MCNC: 4 G/DL (ref 3.5–5)
ALP SERPL-CCNC: 54 U/L (ref 34–104)
ALT SERPL W P-5'-P-CCNC: 20 U/L (ref 7–52)
ANION GAP SERPL CALCULATED.3IONS-SCNC: 11 MMOL/L (ref 4–13)
AST SERPL W P-5'-P-CCNC: 23 U/L (ref 13–39)
BASOPHILS # BLD AUTO: 0.03 THOUSANDS/ÂΜL (ref 0–0.1)
BASOPHILS NFR BLD AUTO: 0 % (ref 0–1)
BILIRUB SERPL-MCNC: 0.56 MG/DL (ref 0.2–1)
BUN SERPL-MCNC: 16 MG/DL (ref 5–25)
CALCIUM SERPL-MCNC: 9.5 MG/DL (ref 8.4–10.2)
CHLORIDE SERPL-SCNC: 98 MMOL/L (ref 96–108)
CHOLEST SERPL-MCNC: 146 MG/DL
CO2 SERPL-SCNC: 32 MMOL/L (ref 21–32)
CREAT SERPL-MCNC: 0.36 MG/DL (ref 0.6–1.3)
EOSINOPHIL # BLD AUTO: 0.08 THOUSAND/ÂΜL (ref 0–0.61)
EOSINOPHIL NFR BLD AUTO: 1 % (ref 0–6)
ERYTHROCYTE [DISTWIDTH] IN BLOOD BY AUTOMATED COUNT: 14.1 % (ref 11.6–15.1)
GFR SERPL CREATININE-BSD FRML MDRD: 103 ML/MIN/1.73SQ M
GLUCOSE P FAST SERPL-MCNC: 97 MG/DL (ref 65–99)
HCT VFR BLD AUTO: 46.7 % (ref 34.8–46.1)
HDLC SERPL-MCNC: 65 MG/DL
HGB BLD-MCNC: 14.7 G/DL (ref 11.5–15.4)
IMM GRANULOCYTES # BLD AUTO: 0.02 THOUSAND/UL (ref 0–0.2)
IMM GRANULOCYTES NFR BLD AUTO: 0 % (ref 0–2)
LDLC SERPL DIRECT ASSAY-MCNC: 64 MG/DL (ref 0–100)
LYMPHOCYTES # BLD AUTO: 1.11 THOUSANDS/ÂΜL (ref 0.6–4.47)
LYMPHOCYTES NFR BLD AUTO: 15 % (ref 14–44)
MCH RBC QN AUTO: 29.3 PG (ref 26.8–34.3)
MCHC RBC AUTO-ENTMCNC: 31.5 G/DL (ref 31.4–37.4)
MCV RBC AUTO: 93 FL (ref 82–98)
MONOCYTES # BLD AUTO: 0.66 THOUSAND/ÂΜL (ref 0.17–1.22)
MONOCYTES NFR BLD AUTO: 9 % (ref 4–12)
NEUTROPHILS # BLD AUTO: 5.53 THOUSANDS/ÂΜL (ref 1.85–7.62)
NEUTS SEG NFR BLD AUTO: 75 % (ref 43–75)
NRBC BLD AUTO-RTO: 0 /100 WBCS
PLATELET # BLD AUTO: 298 THOUSANDS/UL (ref 149–390)
PMV BLD AUTO: 10.9 FL (ref 8.9–12.7)
POTASSIUM SERPL-SCNC: 4.1 MMOL/L (ref 3.5–5.3)
PROT SERPL-MCNC: 7 G/DL (ref 6.4–8.4)
RBC # BLD AUTO: 5.02 MILLION/UL (ref 3.81–5.12)
SODIUM SERPL-SCNC: 141 MMOL/L (ref 135–147)
WBC # BLD AUTO: 7.43 THOUSAND/UL (ref 4.31–10.16)

## 2024-10-01 PROCEDURE — 83718 ASSAY OF LIPOPROTEIN: CPT

## 2024-10-01 PROCEDURE — 36415 COLL VENOUS BLD VENIPUNCTURE: CPT

## 2024-10-01 PROCEDURE — 82465 ASSAY BLD/SERUM CHOLESTEROL: CPT

## 2024-10-01 PROCEDURE — 83721 ASSAY OF BLOOD LIPOPROTEIN: CPT

## 2024-10-01 PROCEDURE — 80053 COMPREHEN METABOLIC PANEL: CPT

## 2024-10-01 PROCEDURE — 85025 COMPLETE CBC W/AUTO DIFF WBC: CPT

## 2024-10-07 ENCOUNTER — TELEPHONE (OUTPATIENT)
Age: 79
End: 2024-10-07

## 2024-10-07 NOTE — TELEPHONE ENCOUNTER
Patient called in regards to her lab results that were done on 10/1, patient would like a call back.

## 2024-10-08 ENCOUNTER — OFFICE VISIT (OUTPATIENT)
Dept: FAMILY MEDICINE CLINIC | Facility: CLINIC | Age: 79
End: 2024-10-08
Payer: MEDICARE

## 2024-10-08 VITALS
SYSTOLIC BLOOD PRESSURE: 110 MMHG | DIASTOLIC BLOOD PRESSURE: 68 MMHG | OXYGEN SATURATION: 100 % | HEART RATE: 64 BPM | HEIGHT: 63 IN | WEIGHT: 116 LBS | RESPIRATION RATE: 18 BRPM | BODY MASS INDEX: 20.55 KG/M2

## 2024-10-08 DIAGNOSIS — D47.3 ESSENTIAL (HEMORRHAGIC) THROMBOCYTHEMIA (HCC): ICD-10-CM

## 2024-10-08 DIAGNOSIS — E78.2 MIXED HYPERLIPIDEMIA: ICD-10-CM

## 2024-10-08 DIAGNOSIS — Z23 NEED FOR COVID-19 VACCINE: ICD-10-CM

## 2024-10-08 DIAGNOSIS — Z00.00 ENCOUNTER FOR ANNUAL WELLNESS VISIT (AWV) IN MEDICARE PATIENT: ICD-10-CM

## 2024-10-08 DIAGNOSIS — Z23 ENCOUNTER FOR IMMUNIZATION: ICD-10-CM

## 2024-10-08 DIAGNOSIS — Z29.11 NEED FOR RSV VACCINATION: ICD-10-CM

## 2024-10-08 DIAGNOSIS — R73.9 HYPERGLYCEMIA: ICD-10-CM

## 2024-10-08 DIAGNOSIS — I10 PRIMARY HYPERTENSION: Primary | ICD-10-CM

## 2024-10-08 DIAGNOSIS — E03.9 ACQUIRED HYPOTHYROIDISM: ICD-10-CM

## 2024-10-08 DIAGNOSIS — Z85.3 HISTORY OF BREAST CANCER: ICD-10-CM

## 2024-10-08 DIAGNOSIS — Z23 NEED FOR ZOSTER VACCINE: ICD-10-CM

## 2024-10-08 DIAGNOSIS — Z79.811 USE OF ANASTROZOLE (ARIMIDEX): ICD-10-CM

## 2024-10-08 PROCEDURE — 90662 IIV NO PRSV INCREASED AG IM: CPT

## 2024-10-08 PROCEDURE — G0439 PPPS, SUBSEQ VISIT: HCPCS | Performed by: FAMILY MEDICINE

## 2024-10-08 PROCEDURE — 99214 OFFICE O/P EST MOD 30 MIN: CPT | Performed by: FAMILY MEDICINE

## 2024-10-08 PROCEDURE — G0008 ADMIN INFLUENZA VIRUS VAC: HCPCS

## 2024-10-08 NOTE — PATIENT INSTRUCTIONS
Medicare Preventive Visit Patient Instructions  Thank you for completing your Welcome to Medicare Visit or Medicare Annual Wellness Visit today. Your next wellness visit will be due in one year (10/9/2025).  The screening/preventive services that you may require over the next 5-10 years are detailed below. Some tests may not apply to you based off risk factors and/or age. Screening tests ordered at today's visit but not completed yet may show as past due. Also, please note that scanned in results may not display below.  Preventive Screenings:  Service Recommendations Previous Testing/Comments   Colorectal Cancer Screening  * Colonoscopy    * Fecal Occult Blood Test (FOBT)/Fecal Immunochemical Test (FIT)  * Fecal DNA/Cologuard Test  * Flexible Sigmoidoscopy Age: 45-75 years old   Colonoscopy: every 10 years (may be performed more frequently if at higher risk)  OR  FOBT/FIT: every 1 year  OR  Cologuard: every 3 years  OR  Sigmoidoscopy: every 5 years  Screening may be recommended earlier than age 45 if at higher risk for colorectal cancer. Also, an individualized decision between you and your healthcare provider will decide whether screening between the ages of 76-85 would be appropriate. Colonoscopy: 07/29/2019  FOBT/FIT: Not on file  Cologuard: Not on file  Sigmoidoscopy: Not on file          Breast Cancer Screening Age: 40+ years old  Frequency: every 1-2 years  Not required if history of left and right mastectomy Mammogram: 06/24/2024    History Breast Cancer   Cervical Cancer Screening Between the ages of 21-29, pap smear recommended once every 3 years.   Between the ages of 30-65, can perform pap smear with HPV co-testing every 5 years.   Recommendations may differ for women with a history of total hysterectomy, cervical cancer, or abnormal pap smears in past. Pap Smear: 05/18/2023    Screening Not Indicated   Hepatitis C Screening Once for adults born between 1945 and 1965  More frequently in patients at high  risk for Hepatitis C Hep C Antibody: Not on file        Diabetes Screening 1-2 times per year if you're at risk for diabetes or have pre-diabetes Fasting glucose: 97 mg/dL (10/1/2024)  A1C: No results in last 5 years (No results in last 5 years)  Screening Current   Cholesterol Screening Once every 5 years if you don't have a lipid disorder. May order more often based on risk factors. Lipid panel: 03/28/2024    Screening Not Indicated  History Lipid Disorder     Other Preventive Screenings Covered by Medicare:  Abdominal Aortic Aneurysm (AAA) Screening: covered once if your at risk. You're considered to be at risk if you have a family history of AAA.  Lung Cancer Screening: covers low dose CT scan once per year if you meet all of the following conditions: (1) Age 55-77; (2) No signs or symptoms of lung cancer; (3) Current smoker or have quit smoking within the last 15 years; (4) You have a tobacco smoking history of at least 20 pack years (packs per day multiplied by number of years you smoked); (5) You get a written order from a healthcare provider.  Glaucoma Screening: covered annually if you're considered high risk: (1) You have diabetes OR (2) Family history of glaucoma OR (3)  aged 50 and older OR (4)  American aged 65 and older  Osteoporosis Screening: covered every 2 years if you meet one of the following conditions: (1) You're estrogen deficient and at risk for osteoporosis based off medical history and other findings; (2) Have a vertebral abnormality; (3) On glucocorticoid therapy for more than 3 months; (4) Have primary hyperparathyroidism; (5) On osteoporosis medications and need to assess response to drug therapy.   Last bone density test (DXA Scan): 08/02/2023.  HIV Screening: covered annually if you're between the age of 15-65. Also covered annually if you are younger than 15 and older than 65 with risk factors for HIV infection. For pregnant patients, it is covered up to 3  times per pregnancy.    Immunizations:  Immunization Recommendations   Influenza Vaccine Annual influenza vaccination during flu season is recommended for all persons aged >= 6 months who do not have contraindications   Pneumococcal Vaccine   * Pneumococcal conjugate vaccine = PCV13 (Prevnar 13), PCV15 (Vaxneuvance), PCV20 (Prevnar 20)  * Pneumococcal polysaccharide vaccine = PPSV23 (Pneumovax) Adults 19-65 yo with certain risk factors or if 65+ yo  If never received any pneumonia vaccine: recommend Prevnar 20 (PCV20)  Give PCV20 if previously received 1 dose of PCV13 or PPSV23   Hepatitis B Vaccine 3 dose series if at intermediate or high risk (ex: diabetes, end stage renal disease, liver disease)   Respiratory syncytial virus (RSV) Vaccine - COVERED BY MEDICARE PART D  * RSVPreF3 (Arexvy) CDC recommends that adults 60 years of age and older may receive a single dose of RSV vaccine using shared clinical decision-making (SCDM)   Tetanus (Td) Vaccine - COST NOT COVERED BY MEDICARE PART B Following completion of primary series, a booster dose should be given every 10 years to maintain immunity against tetanus. Td may also be given as tetanus wound prophylaxis.   Tdap Vaccine - COST NOT COVERED BY MEDICARE PART B Recommended at least once for all adults. For pregnant patients, recommended with each pregnancy.   Shingles Vaccine (Shingrix) - COST NOT COVERED BY MEDICARE PART B  2 shot series recommended in those 19 years and older who have or will have weakened immune systems or those 50 years and older     Health Maintenance Due:      Topic Date Due    Hepatitis C Screening  Never done    Cervical Cancer Screening  05/18/2025    Breast Cancer Screening: Mammogram  06/24/2025    DXA SCAN  07/26/2028    Colorectal Cancer Screening  Discontinued     Immunizations Due:      Topic Date Due    Influenza Vaccine (1) 09/01/2024     Advance Directives   What are advance directives?  Advance directives are legal documents that  state your wishes and plans for medical care. These plans are made ahead of time in case you lose your ability to make decisions for yourself. Advance directives can apply to any medical decision, such as the treatments you want, and if you want to donate organs.   What are the types of advance directives?  There are many types of advance directives, and each state has rules about how to use them. You may choose a combination of any of the following:  Living will:  This is a written record of the treatment you want. You can also choose which treatments you do not want, which to limit, and which to stop at a certain time. This includes surgery, medicine, IV fluid, and tube feedings.   Durable power of  for healthcare (DPAHC):  This is a written record that states who you want to make healthcare choices for you when you are unable to make them for yourself. This person, called a proxy, is usually a family member or a friend. You may choose more than 1 proxy.  Do not resuscitate (DNR) order:  A DNR order is used in case your heart stops beating or you stop breathing. It is a request not to have certain forms of treatment, such as CPR. A DNR order may be included in other types of advance directives.  Medical directive:  This covers the care that you want if you are in a coma, near death, or unable to make decisions for yourself. You can list the treatments you want for each condition. Treatment may include pain medicine, surgery, blood transfusions, dialysis, IV or tube feedings, and a ventilator (breathing machine).  Values history:  This document has questions about your views, beliefs, and how you feel and think about life. This information can help others choose the care that you would choose.  Why are advance directives important?  An advance directive helps you control your care. Although spoken wishes may be used, it is better to have your wishes written down. Spoken wishes can be misunderstood, or not  followed. Treatments may be given even if you do not want them. An advance directive may make it easier for your family to make difficult choices about your care.       © Copyright [employer] 2018 Information is for End User's use only and may not be sold, redistributed or otherwise used for commercial purposes. All illustrations and images included in CareNotes® are the copyrighted property of [employer] or [employer]  1. Primary hypertension  Assessment & Plan:  Blood pressure is excellent.  No change.         2. Mixed hyperlipidemia  Assessment & Plan:  Cholesterol excellent. Continue Pravastatin. Check in 6 months.           3. Hyperglycemia  Assessment & Plan:  Blood sugar was completely normal.  Follow-up at next scheduled visit.  Limit carbohydrates.         4. Acquired hypothyroidism  Assessment & Plan:  Currently on Synthroid.  No changes.  Continue current medication.  Recheck next visit.         5. Essential (hemorrhagic) thrombocythemia (HCC)  Assessment & Plan:  Patient does have history of some minor changes with CBC.  Platelets were normal.  Can check with next blood work.         6. History of breast cancer  Assessment & Plan:  Patient had diagnosis approximately 10 years ago.  Will change to personal history of breast cancer.           7. Use of anastrozole (Arimidex)  Assessment & Plan:  Patient reports she is now off anastrozole.  Follow-up per breast surgeons.         8. Encounter for annual wellness visit (AWV) in Medicare patient  Comments:  Reviewed health maintenance, advance directives, immunizations.  9. Encounter for immunization  Comments:  Recommend flu vaccine today, patient did request this.  Will give.  Orders:  -     influenza vaccine, high-dose, PF 0.5 mL (Fluzone High Dose)  10. Need for zoster vaccine  Comments:  Recommend shingles vaccine series.  11. Need for COVID-19 vaccine  Comments:  Recommend updated 24/25 COVID-vaccine per CDC guidelines  12. Need for RSV  vaccination  Comments:  Recommend RSV vaccine at local pharmacy.      COVID 19 Instructions    Romayne Geissenhainer was advised to limit contact with others to essential tasks such as getting food, medications, and medical care.    Proper handwashing reviewed, and Hand sanitzer when washing is not available.    If the patient develops symptoms of COVID 19, the patient should call the office as soon as possible.    It is strongly recommended that Flu Vaccinations be obtained.      Virtual Visits:  AmWell: This works on smart phones (any phone with Internet browsing capability).  You should get a text message when the provider is ready to see you.  Click on the link in the text message, and the call should start.  You will need to type in your name, and allow camera and microphone access.  This is HIPPA compliant, and secure.      If you have not already done so, get immunized to COVID 19.      We are committed to getting you vaccinated as soon as possible and will be closely following CDC and [facility] guidelines as they are released and revised.  Please refer to our COVID-19 vaccine webpage for the most up to date information on the vaccine and our distribution efforts.    This site will also have the most up to date recommendations for COVID booster vaccine.    [email or web address]    Call [phone] ([phone]), option 7    You can also visit https://www.vaccines.gov/ to find vaccines in your area.    OUR LOCATION:    [facility]  [address] [place] [address], [address]  [place], [place], [address] [phone]  Fax: [phone]    Lab services, Rheumatology, and OB/GYN are at this location as well.  Thank you for your inquiry about the RSV vaccine.  As you can see below, the CDC has recommended that you discuss this with your Primary Care provider and decide if the vaccine is right for you.  This discussion can be accomplished at your next office visit.  The  vaccine is currently available at your local pharmacy if you choose to get it prior to your next office visit.     Respiratory syncytial (sin-SISH-lefty) virus, or RSV, is a common respiratory virus that usually causes mild, cold-like symptoms. Most people recover in a week or two, but RSV can be serious. Infants and older adults are more likely to develop severe RSV and need hospitalization. Vaccines are available to protect older adults from severe RSV. Monoclonal antibody products are available to protect infants and young children from severe RSV.    CDC Recommendations  Adults 60 years old and over  Adults 60 years of age and older may receive a single dose of RSV vaccine using shared clinical decision-making.    Infants and young children  1 dose of nirsevimab for all infants younger than 8 months born during or entering their first RSV season.  1 dose of nirsevimab for infants and children 8-19 months old who are at increased risk for severe RSV disease and entering their second RSV season.  Note: A different monoclonal antibody, palivizumab, is limited to children under 24 months of age with certain conditions that place them at high risk for severe RSV disease. It must be given once a month during RSV season. Please see AAP guidelines for palivizumab.    Pregnant people  1 dose of maternal RSV vaccine during weeks 32 through 36 of pregnancy, administered immediately before or during RSV season. Abrysvo is the only RSV vaccine recommended during pregnancy.    If you have any questions about RSV or the products above, talk to your healthcare provider.

## 2024-10-08 NOTE — ASSESSMENT & PLAN NOTE
Patient had diagnosis approximately 10 years ago.  Will change to personal history of breast cancer.

## 2024-10-08 NOTE — ASSESSMENT & PLAN NOTE
Patient does have history of some minor changes with CBC.  Platelets were normal.  Can check with next blood work.

## 2024-10-08 NOTE — PROGRESS NOTES
Ambulatory Visit  Name: Romayne Geissenhainer      : 1945      MRN: [id number]  Encounter Provider: Dejan Trevino MD  Encounter Date: 10/8/2024   Encounter department: [facility]    Assessment & Plan  Primary hypertension  Blood pressure is excellent.  No change.         Mixed hyperlipidemia  Cholesterol excellent. Continue Pravastatin. Check in 6 months.           Hyperglycemia  Blood sugar was completely normal.  Follow-up at next scheduled visit.  Limit carbohydrates.         Acquired hypothyroidism  Currently on Synthroid.  No changes.  Continue current medication.  Recheck next visit.         Essential (hemorrhagic) thrombocythemia (HCC)  Patient does have history of some minor changes with CBC.  Platelets were normal.  Can check with next blood work.         History of breast cancer  Patient had diagnosis approximately 10 years ago.  Will change to personal history of breast cancer.           Use of anastrozole (Arimidex)  Patient reports she is now off anastrozole.  Follow-up per breast surgeons.         Encounter for annual wellness visit (AWV) in Medicare patient         Encounter for immunization    Orders:    influenza vaccine, high-dose, PF 0.5 mL (Fluzone High Dose)    Need for zoster vaccine         Need for COVID-19 vaccine         Need for RSV vaccination            Preventive health issues were discussed with patient, and age appropriate screening tests were ordered as noted in patient's After Visit Summary. Personalized health advice and appropriate referrals for health education or preventive services given if needed, as noted in patient's After Visit Summary.      1. Primary hypertension  Assessment & Plan:  Blood pressure is excellent.  No change.  2. Mixed hyperlipidemia  Assessment & Plan:  Cholesterol excellent. Continue Pravastatin. Check in 6 months.    3. Hyperglycemia  Assessment & Plan:  Blood sugar was completely normal.  Follow-up at next  scheduled visit.  Limit carbohydrates.  4. Acquired hypothyroidism  Assessment & Plan:  Currently on Synthroid.  No changes.  Continue current medication.  Recheck next visit.  5. Essential (hemorrhagic) thrombocythemia (HCC)  Assessment & Plan:  Patient does have history of some minor changes with CBC.  Platelets were normal.  Can check with next blood work.  6. History of breast cancer  Assessment & Plan:  Patient had diagnosis approximately 10 years ago.  Will change to personal history of breast cancer.    7. Use of anastrozole (Arimidex)  Assessment & Plan:  Patient reports she is now off anastrozole.  Follow-up per breast surgeons.  8. Encounter for annual wellness visit (AWV) in Medicare patient  Comments:  Reviewed health maintenance, advance directives, immunizations.  9. Encounter for immunization  Comments:  Recommend flu vaccine today, patient did request this.  Will give.  Orders:  -     influenza vaccine, high-dose, PF 0.5 mL (Fluzone High Dose)  10. Need for zoster vaccine  Comments:  Recommend shingles vaccine series.  11. Need for COVID-19 vaccine  Comments:  Recommend updated 24/25 COVID-vaccine per CDC guidelines  12. Need for RSV vaccination  Comments:  Recommend RSV vaccine at local pharmacy.      Chief Complaint   Patient presents with    Medicare Wellness Visit    Flu Vaccine         History of Present Illness     Patient is here today to follow-up on multiple issues.    Unfortunately, she does have significant amount of stress on her secondary to her 's illnesses.  He is quite ill, and she is primary caregiver.    Hypothyroid currently on Synthroid 50 mcg.  No specific issues.    Hyperlipidemia: Currently on pravastatin 40.    Hypertension: On hydrochlorothiazide.    Breast cancer: Currently off anastrozole.  Just completed therapy.       Patient Care Team:  Dejan Trevino MD as PCP - General  MD Yandel Sherwood DO Tricia Kelly, MD Grace Fan, MD Camilo  Chema Acuña MD (Radiation Oncology)  ABDOULAYE Pizarro as Nurse Practitioner (Surgical Oncology)    Review of Systems   Constitutional: Negative.    HENT: Negative.     Eyes: Negative.    Respiratory: Negative.     Cardiovascular: Negative.    Gastrointestinal: Negative.    Endocrine: Negative.    Genitourinary: Negative.    Musculoskeletal: Negative.    Skin: Negative.    Allergic/Immunologic: Negative.    Neurological: Negative.    Hematological: Negative.    Psychiatric/Behavioral: Negative.       Medical History Reviewed by provider this encounter:       Annual Wellness Visit Questionnaire   Romayne is here for her Subsequent Wellness visit.     Health Risk Assessment:   Patient rates overall health as good. Patient feels that their physical health rating is same. Patient is dissatisfied with their life. Eyesight was rated as slightly worse. Hearing was rated as slightly worse. Patient feels that their emotional and mental health rating is much worse. Patients states they are never, rarely angry. Patient states they are often unusually tired/fatigued. Pain experienced in the last 7 days has been none. Patient states that she has experienced no weight loss or gain in last 6 months.     Depression Screening:   PHQ-2 Score: 0      Fall Risk Screening:   In the past year, patient has experienced: no history of falling in past year      Urinary Incontinence Screening:   Patient has not leaked urine accidently in the last six months.     Home Safety:  Patient does not have trouble with stairs inside or outside of their home. Patient has working smoke alarms and has working carbon monoxide detector. Home safety hazards include: none.     Nutrition:   Current diet is Regular.     Medications:   Patient is currently taking over-the-counter supplements. OTC medications include: see medication list. Patient is able to manage medications.     Activities of Daily Living (ADLs)/Instrumental Activities of  Daily Living (IADLs):   Walk and transfer into and out of bed and chair?: Yes  Dress and groom yourself?: Yes    Bathe or shower yourself?: Yes    Feed yourself? Yes  Do your laundry/housekeeping?: Yes  Manage your money, pay your bills and track your expenses?: Yes  Make your own meals?: Yes    Do your own shopping?: Yes    Previous Hospitalizations:   Any hospitalizations or ED visits within the last 12 months?: No      Advance Care Planning:   Living will: Yes    Durable POA for healthcare: Yes    Advanced directive: Yes    Advanced directive counseling given: Yes      Cognitive Screening:   Provider or family/friend/caregiver concerned regarding cognition?: No    PREVENTIVE SCREENINGS      Cardiovascular Screening:    General: Screening Not Indicated and History Lipid Disorder      Diabetes Screening:     General: Screening Current      Colorectal Cancer Screening:     General: Screening Not Indicated      Breast Cancer Screening:     General: History Breast Cancer      Cervical Cancer Screening:    General: Screening Not Indicated      Osteoporosis Screening:    General: Screening Current      Abdominal Aortic Aneurysm (AAA) Screening:        General: Screening Not Indicated      Lung Cancer Screening:     General: Screening Not Indicated      Hepatitis C Screening:    General: Screening Not Indicated    Screening, Brief Intervention, and Referral to Treatment (SBIRT)    Screening  Typical number of drinks in a day: 0  Typical number of drinks in a week: 7  Interpretation: Low risk drinking behavior.    Single Item Drug Screening:  How often have you used an illegal drug (including marijuana) or a prescription medication for non-medical reasons in the past year? never    Single Item Drug Screen Score: 0  Interpretation: Negative screen for possible drug use disorder    Social Determinants of Health     Financial Resource Strain: Low Risk  (9/28/2023)    Overall Financial Resource Strain (CARDIA)     Difficulty  "of Paying Living Expenses: Not hard at all   Food Insecurity: No Food Insecurity (10/8/2024)    Hunger Vital Sign     Worried About Running Out of Food in the Last Year: Never true     Ran Out of Food in the Last Year: Never true   Transportation Needs: No Transportation Needs (10/8/2024)    PRAPARE - Transportation     Lack of Transportation (Medical): No     Lack of Transportation (Non-Medical): No   Housing Stability: Low Risk  (10/8/2024)    Housing Stability Vital Sign     Unable to Pay for Housing in the Last Year: No     Number of Times Moved in the Last Year: 0     Homeless in the Last Year: No   Utilities: Not At Risk (10/8/2024)    [facility] Utilities     Threatened with loss of utilities: No     No results found.    White count 7.43, hemoglobin 14.7, hematocrit 46.7, platelets 298.  Sodium 141, potassium 4.1, calcium 9.7.  Creatinine 0.36, .  Blood sugar 97.  AST 23, ALT 20.  Total cholesterol 146, LDL 64, HDL 65    Objective     /68 (BP Location: Right arm, Patient Position: Sitting, Cuff Size: Standard)   Pulse 64   Resp 18   Ht 5' 3\" (1.6 m)   Wt 52.6 kg (116 lb)   SpO2 100%   BMI 20.55 kg/m²     Physical Exam  Vitals and nursing note reviewed.   Constitutional:       Appearance: Normal appearance. She is well-developed.   HENT:      Head: Normocephalic and atraumatic.   Cardiovascular:      Rate and Rhythm: Normal rate and regular rhythm.      Pulses:           Carotid pulses are 2+ on the right side and 2+ on the left side.     Heart sounds: Normal heart sounds.   Pulmonary:      Effort: Pulmonary effort is normal.      Breath sounds: Normal breath sounds. No wheezing or rales.   Chest:      Chest wall: No tenderness.   Neurological:      Mental Status: She is alert.         "

## 2024-10-08 NOTE — ASSESSMENT & PLAN NOTE
Blood sugar was completely normal.  Follow-up at next scheduled visit.  Limit carbohydrates.

## 2024-10-24 ENCOUNTER — CLINICAL SUPPORT (OUTPATIENT)
Dept: FAMILY MEDICINE CLINIC | Facility: CLINIC | Age: 79
End: 2024-10-24
Payer: MEDICARE

## 2024-10-24 DIAGNOSIS — Z23 NEED FOR COVID-19 VACCINE: Primary | ICD-10-CM

## 2024-10-24 PROCEDURE — 90480 ADMN SARSCOV2 VAC 1/ONLY CMP: CPT

## 2024-10-24 PROCEDURE — 91320 SARSCV2 VAC 30MCG TRS-SUC IM: CPT

## 2025-01-01 ENCOUNTER — APPOINTMENT (INPATIENT)
Dept: CT IMAGING | Facility: HOSPITAL | Age: 80
DRG: 871 | End: 2025-01-01
Payer: MEDICARE

## 2025-01-01 ENCOUNTER — HOSPITAL ENCOUNTER (INPATIENT)
Facility: HOSPITAL | Age: 80
LOS: 2 days | DRG: 871 | End: 2025-03-21
Attending: EMERGENCY MEDICINE | Admitting: INTERNAL MEDICINE
Payer: MEDICARE

## 2025-01-01 ENCOUNTER — APPOINTMENT (INPATIENT)
Dept: RADIOLOGY | Facility: HOSPITAL | Age: 80
DRG: 871 | End: 2025-01-01
Payer: MEDICARE

## 2025-01-01 ENCOUNTER — NURSE TRIAGE (OUTPATIENT)
Age: 80
End: 2025-01-01

## 2025-01-01 ENCOUNTER — APPOINTMENT (EMERGENCY)
Dept: RADIOLOGY | Facility: HOSPITAL | Age: 80
DRG: 871 | End: 2025-01-01
Payer: MEDICARE

## 2025-01-01 ENCOUNTER — APPOINTMENT (INPATIENT)
Dept: NON INVASIVE DIAGNOSTICS | Facility: HOSPITAL | Age: 80
DRG: 871 | End: 2025-01-01
Payer: MEDICARE

## 2025-01-01 ENCOUNTER — APPOINTMENT (OUTPATIENT)
Dept: CT IMAGING | Facility: HOSPITAL | Age: 80
DRG: 871 | End: 2025-01-01
Payer: MEDICARE

## 2025-01-01 ENCOUNTER — HOSPITAL ENCOUNTER (EMERGENCY)
Facility: HOSPITAL | Age: 80
Discharge: HOME | DRG: 871 | End: 2025-03-18
Attending: EMERGENCY MEDICINE | Admitting: EMERGENCY MEDICINE
Payer: MEDICARE

## 2025-01-01 VITALS
HEART RATE: 62 BPM | RESPIRATION RATE: 36 BRPM | WEIGHT: 130.29 LBS | BODY MASS INDEX: 23.09 KG/M2 | TEMPERATURE: 97.6 F | SYSTOLIC BLOOD PRESSURE: 118 MMHG | HEIGHT: 63 IN | OXYGEN SATURATION: 80 % | DIASTOLIC BLOOD PRESSURE: 45 MMHG

## 2025-01-01 VITALS
TEMPERATURE: 98 F | HEART RATE: 85 BPM | DIASTOLIC BLOOD PRESSURE: 82 MMHG | SYSTOLIC BLOOD PRESSURE: 196 MMHG | OXYGEN SATURATION: 94 % | RESPIRATION RATE: 22 BRPM

## 2025-01-01 DIAGNOSIS — R06.00 DYSPNEA: ICD-10-CM

## 2025-01-01 DIAGNOSIS — I10 HYPERTENSION: ICD-10-CM

## 2025-01-01 DIAGNOSIS — R09.81 NASAL CONGESTION: ICD-10-CM

## 2025-01-01 DIAGNOSIS — R06.00 DYSPNEA: Primary | ICD-10-CM

## 2025-01-01 DIAGNOSIS — I48.91 ATRIAL FIBRILLATION WITH RVR (HCC): ICD-10-CM

## 2025-01-01 DIAGNOSIS — R57.9 SHOCK (HCC): Primary | ICD-10-CM

## 2025-01-01 LAB
2HR DELTA HS TROPONIN: -2 NG/L
2HR DELTA HS TROPONIN: -4 NG/L
4HR DELTA HS TROPONIN: 6 NG/L
4HR DELTA HS TROPONIN: 7 NG/L
ALBUMIN SERPL BCG-MCNC: 2.7 G/DL (ref 3.5–5)
ALBUMIN SERPL BCG-MCNC: 3.5 G/DL (ref 3.5–5)
ALBUMIN SERPL BCG-MCNC: 3.6 G/DL (ref 3.5–5)
ALBUMIN SERPL BCG-MCNC: 4.1 G/DL (ref 3.5–5)
ALP SERPL-CCNC: 65 U/L (ref 34–104)
ALP SERPL-CCNC: 67 U/L (ref 34–104)
ALP SERPL-CCNC: 83 U/L (ref 34–104)
ALP SERPL-CCNC: 99 U/L (ref 34–104)
ALT SERPL W P-5'-P-CCNC: 15 U/L (ref 7–52)
ALT SERPL W P-5'-P-CCNC: 28 U/L (ref 7–52)
ALT SERPL W P-5'-P-CCNC: 31 U/L (ref 7–52)
ALT SERPL W P-5'-P-CCNC: 34 U/L (ref 7–52)
ANION GAP SERPL CALCULATED.3IONS-SCNC: 1 MMOL/L (ref 4–13)
ANION GAP SERPL CALCULATED.3IONS-SCNC: 10 MMOL/L (ref 4–13)
ANION GAP SERPL CALCULATED.3IONS-SCNC: 3 MMOL/L (ref 4–13)
ANION GAP SERPL CALCULATED.3IONS-SCNC: 6 MMOL/L (ref 4–13)
ANION GAP SERPL CALCULATED.3IONS-SCNC: 8 MMOL/L (ref 4–13)
AORTIC ROOT: 3.1 CM
AORTIC VALVE MEAN VELOCITY: 10.2 M/S
APTT PPP: 29 SECONDS (ref 23–34)
APTT PPP: 34 SECONDS (ref 23–34)
APTT PPP: 52 SECONDS (ref 23–34)
APTT PPP: 61 SECONDS (ref 23–34)
APTT PPP: 98 SECONDS (ref 23–34)
ARTERIAL PATENCY WRIST A: YES
ASCENDING AORTA: 3.7 CM
AST SERPL W P-5'-P-CCNC: 24 U/L (ref 13–39)
AST SERPL W P-5'-P-CCNC: 36 U/L (ref 13–39)
AST SERPL W P-5'-P-CCNC: 38 U/L (ref 13–39)
AST SERPL W P-5'-P-CCNC: 50 U/L (ref 13–39)
ATRIAL RATE: 101 BPM
ATRIAL RATE: 122 BPM
ATRIAL RATE: 153 BPM
ATRIAL RATE: 212 BPM
ATRIAL RATE: 277 BPM
ATRIAL RATE: 277 BPM
ATRIAL RATE: 288 BPM
ATRIAL RATE: 59 BPM
ATRIAL RATE: 71 BPM
ATRIAL RATE: 74 BPM
ATRIAL RATE: 86 BPM
AV AREA BY CONTINUOUS VTI: 2.1 CM2
AV AREA PEAK VELOCITY: 1.8 CM2
AV LVOT MEAN GRADIENT: 2 MMHG
AV LVOT PEAK GRADIENT: 4 MMHG
AV MEAN PRESS GRAD SYS DOP V1V2: 4 MMHG
AV ORIFICE AREA US: 2.05 CM2
AV PEAK GRADIENT: 8 MMHG
AV REGURGITATION PRESSURE HALF TIME: 423 MS
AV VELOCITY RATIO: 0.81
AV VMAX SYS DOP: 1.37 M/S
BASE EX.OXY STD BLDV CALC-SCNC: 70.3 % (ref 60–80)
BASE EX.OXY STD BLDV CALC-SCNC: 75.1 % (ref 60–80)
BASE EX.OXY STD BLDV CALC-SCNC: 76.3 % (ref 60–80)
BASE EX.OXY STD BLDV CALC-SCNC: 86.2 % (ref 60–80)
BASE EXCESS BLDA CALC-SCNC: 11 MMOL/L (ref -2–3)
BASE EXCESS BLDA CALC-SCNC: 7 MMOL/L (ref -2–3)
BASE EXCESS BLDA CALC-SCNC: 9.6 MMOL/L
BASE EXCESS BLDV CALC-SCNC: 4 MMOL/L
BASE EXCESS BLDV CALC-SCNC: 5.6 MMOL/L
BASE EXCESS BLDV CALC-SCNC: 6.7 MMOL/L
BASE EXCESS BLDV CALC-SCNC: 9.5 MMOL/L
BASOPHILS # BLD AUTO: 0.03 THOUSANDS/ÂΜL (ref 0–0.1)
BASOPHILS # BLD AUTO: 0.04 THOUSANDS/ÂΜL (ref 0–0.1)
BASOPHILS # BLD AUTO: 0.12 THOUSANDS/ÂΜL (ref 0–0.1)
BASOPHILS # BLD MANUAL: 0.13 THOUSAND/UL (ref 0–0.1)
BASOPHILS NFR BLD AUTO: 0 % (ref 0–1)
BASOPHILS NFR BLD AUTO: 1 % (ref 0–1)
BASOPHILS NFR MAR MANUAL: 1 % (ref 0–1)
BILIRUB SERPL-MCNC: 0.29 MG/DL (ref 0.2–1)
BILIRUB SERPL-MCNC: 0.64 MG/DL (ref 0.2–1)
BILIRUB SERPL-MCNC: 0.67 MG/DL (ref 0.2–1)
BILIRUB SERPL-MCNC: 0.91 MG/DL (ref 0.2–1)
BNP SERPL-MCNC: 256 PG/ML (ref 0–100)
BSA FOR ECHO PROCEDURE: 1.54 M2
BUN SERPL-MCNC: 16 MG/DL (ref 5–25)
BUN SERPL-MCNC: 18 MG/DL (ref 5–25)
BUN SERPL-MCNC: 21 MG/DL (ref 5–25)
BUN SERPL-MCNC: 21 MG/DL (ref 5–25)
BUN SERPL-MCNC: 22 MG/DL (ref 5–25)
CA-I BLD-SCNC: 1.09 MMOL/L (ref 1.12–1.32)
CA-I BLD-SCNC: 1.11 MMOL/L (ref 1.12–1.32)
CA-I BLD-SCNC: 1.22 MMOL/L (ref 1.12–1.32)
CA-I BLD-SCNC: 1.24 MMOL/L (ref 1.12–1.32)
CA-I BLD-SCNC: 1.4 MMOL/L (ref 1.12–1.32)
CALCIUM ALBUM COR SERPL-MCNC: 9.4 MG/DL (ref 8.3–10.1)
CALCIUM SERPL-MCNC: 8.4 MG/DL (ref 8.4–10.2)
CALCIUM SERPL-MCNC: 8.7 MG/DL (ref 8.4–10.2)
CALCIUM SERPL-MCNC: 8.8 MG/DL (ref 8.4–10.2)
CALCIUM SERPL-MCNC: 9.2 MG/DL (ref 8.4–10.2)
CALCIUM SERPL-MCNC: 9.7 MG/DL (ref 8.4–10.2)
CALCIUM SERPL-MCNC: 9.8 MG/DL (ref 8.4–10.2)
CALCIUM SERPL-MCNC: 9.8 MG/DL (ref 8.4–10.2)
CARDIAC TROPONIN I PNL SERPL HS: 11 NG/L (ref ?–50)
CARDIAC TROPONIN I PNL SERPL HS: 11 NG/L (ref ?–50)
CARDIAC TROPONIN I PNL SERPL HS: 13 NG/L (ref ?–50)
CARDIAC TROPONIN I PNL SERPL HS: 19 NG/L (ref ?–50)
CARDIAC TROPONIN I PNL SERPL HS: 57 NG/L (ref ?–50)
CARDIAC TROPONIN I PNL SERPL HS: 61 NG/L (ref ?–50)
CARDIAC TROPONIN I PNL SERPL HS: 68 NG/L (ref ?–50)
CHLORIDE SERPL-SCNC: 103 MMOL/L (ref 96–108)
CHLORIDE SERPL-SCNC: 104 MMOL/L (ref 96–108)
CHLORIDE SERPL-SCNC: 97 MMOL/L (ref 96–108)
CHLORIDE SERPL-SCNC: 98 MMOL/L (ref 96–108)
CHLORIDE SERPL-SCNC: 98 MMOL/L (ref 96–108)
CHLORIDE SERPL-SCNC: 99 MMOL/L (ref 96–108)
CHLORIDE SERPL-SCNC: 99 MMOL/L (ref 96–108)
CK SERPL-CCNC: 82 U/L (ref 26–192)
CO2 SERPL-SCNC: 31 MMOL/L (ref 21–32)
CO2 SERPL-SCNC: 33 MMOL/L (ref 21–32)
CO2 SERPL-SCNC: 34 MMOL/L (ref 21–32)
CO2 SERPL-SCNC: 34 MMOL/L (ref 21–32)
CO2 SERPL-SCNC: 36 MMOL/L (ref 21–32)
CO2 SERPL-SCNC: 37 MMOL/L (ref 21–32)
CO2 SERPL-SCNC: 39 MMOL/L (ref 21–32)
CORTIS SERPL-MCNC: 41.3 UG/DL
CREAT SERPL-MCNC: 0.25 MG/DL (ref 0.6–1.3)
CREAT SERPL-MCNC: 0.32 MG/DL (ref 0.6–1.3)
CREAT SERPL-MCNC: 0.32 MG/DL (ref 0.6–1.3)
CREAT SERPL-MCNC: 0.37 MG/DL (ref 0.6–1.3)
CREAT SERPL-MCNC: 0.4 MG/DL (ref 0.6–1.3)
CREAT SERPL-MCNC: 0.42 MG/DL (ref 0.6–1.3)
CREAT SERPL-MCNC: 0.49 MG/DL (ref 0.6–1.3)
D DIMER PPP FEU-MCNC: 0.56 UG/ML FEU
DOP CALC AO VTI: 26.77 CM
DOP CALC LVOT AREA: 2.54 CM2
DOP CALC LVOT CARDIAC INDEX: 2.48 L/MIN/M2
DOP CALC LVOT CARDIAC OUTPUT: 3.82 L/MIN
DOP CALC LVOT DIAMETER: 1.8 CM
DOP CALC LVOT PEAK VEL VTI: 21.58 CM
DOP CALC LVOT PEAK VEL: 0.97 M/S
DOP CALC LVOT STROKE INDEX: 35.1 ML/M2
DOP CALC LVOT STROKE VOLUME: 54.89
E WAVE DECELERATION TIME: 166 MS
E/A RATIO: 0.58
EOSINOPHIL # BLD AUTO: 0 THOUSAND/ÂΜL (ref 0–0.61)
EOSINOPHIL # BLD AUTO: 0 THOUSAND/ÂΜL (ref 0–0.61)
EOSINOPHIL # BLD AUTO: 0.01 THOUSAND/ÂΜL (ref 0–0.61)
EOSINOPHIL # BLD AUTO: 0.01 THOUSAND/ÂΜL (ref 0–0.61)
EOSINOPHIL # BLD AUTO: 0.06 THOUSAND/ÂΜL (ref 0–0.61)
EOSINOPHIL # BLD MANUAL: 0 THOUSAND/UL (ref 0–0.4)
EOSINOPHIL NFR BLD AUTO: 0 % (ref 0–6)
EOSINOPHIL NFR BLD AUTO: 1 % (ref 0–6)
EOSINOPHIL NFR BLD MANUAL: 0 % (ref 0–6)
ERYTHROCYTE [DISTWIDTH] IN BLOOD BY AUTOMATED COUNT: 14.6 % (ref 11.6–15.1)
ERYTHROCYTE [DISTWIDTH] IN BLOOD BY AUTOMATED COUNT: 14.7 % (ref 11.6–15.1)
ERYTHROCYTE [DISTWIDTH] IN BLOOD BY AUTOMATED COUNT: 14.7 % (ref 11.6–15.1)
ERYTHROCYTE [DISTWIDTH] IN BLOOD BY AUTOMATED COUNT: 14.8 % (ref 11.6–15.1)
ERYTHROCYTE [DISTWIDTH] IN BLOOD BY AUTOMATED COUNT: 14.9 % (ref 11.6–15.1)
ERYTHROCYTE [DISTWIDTH] IN BLOOD BY AUTOMATED COUNT: 15 % (ref 11.6–15.1)
ERYTHROCYTE [DISTWIDTH] IN BLOOD BY AUTOMATED COUNT: 15.7 % (ref 11.6–15.1)
EST. AVERAGE GLUCOSE BLD GHB EST-MCNC: 111 MG/DL
FLUAV RNA RESP QL NAA+PROBE: NEGATIVE
FLUBV RNA RESP QL NAA+PROBE: NEGATIVE
FRACTIONAL SHORTENING: 27 (ref 28–44)
GFR SERPL CREATININE-BSD FRML MDRD: 100 ML/MIN/1.73SQ M
GFR SERPL CREATININE-BSD FRML MDRD: 102 ML/MIN/1.73SQ M
GFR SERPL CREATININE-BSD FRML MDRD: 107 ML/MIN/1.73SQ M
GFR SERPL CREATININE-BSD FRML MDRD: 107 ML/MIN/1.73SQ M
GFR SERPL CREATININE-BSD FRML MDRD: 116 ML/MIN/1.73SQ M
GFR SERPL CREATININE-BSD FRML MDRD: 93 ML/MIN/1.73SQ M
GFR SERPL CREATININE-BSD FRML MDRD: 98 ML/MIN/1.73SQ M
GLUCOSE SERPL-MCNC: 101 MG/DL (ref 65–140)
GLUCOSE SERPL-MCNC: 104 MG/DL (ref 65–140)
GLUCOSE SERPL-MCNC: 106 MG/DL (ref 65–140)
GLUCOSE SERPL-MCNC: 110 MG/DL (ref 65–140)
GLUCOSE SERPL-MCNC: 113 MG/DL (ref 65–140)
GLUCOSE SERPL-MCNC: 114 MG/DL (ref 65–140)
GLUCOSE SERPL-MCNC: 119 MG/DL (ref 65–140)
GLUCOSE SERPL-MCNC: 121 MG/DL (ref 65–140)
GLUCOSE SERPL-MCNC: 128 MG/DL (ref 65–140)
GLUCOSE SERPL-MCNC: 143 MG/DL (ref 65–140)
GLUCOSE SERPL-MCNC: 143 MG/DL (ref 65–140)
GLUCOSE SERPL-MCNC: 188 MG/DL (ref 65–140)
GLUCOSE SERPL-MCNC: 188 MG/DL (ref 65–140)
GLUCOSE SERPL-MCNC: 209 MG/DL (ref 65–140)
GLUCOSE SERPL-MCNC: 216 MG/DL (ref 65–140)
GLUCOSE SERPL-MCNC: 238 MG/DL (ref 65–140)
HBA1C MFR BLD: 5.5 %
HCO3 BLDA-SCNC: 32.4 MMOL/L (ref 22–28)
HCO3 BLDA-SCNC: 34.1 MMOL/L (ref 22–28)
HCO3 BLDA-SCNC: 38.9 MMOL/L (ref 22–28)
HCO3 BLDV-SCNC: 34.3 MMOL/L (ref 24–30)
HCO3 BLDV-SCNC: 34.9 MMOL/L (ref 24–30)
HCO3 BLDV-SCNC: 36.6 MMOL/L (ref 24–30)
HCO3 BLDV-SCNC: 38.8 MMOL/L (ref 24–30)
HCT VFR BLD AUTO: 35 % (ref 34.8–46.1)
HCT VFR BLD AUTO: 42.9 % (ref 34.8–46.1)
HCT VFR BLD AUTO: 44.2 % (ref 34.8–46.1)
HCT VFR BLD AUTO: 45.2 % (ref 34.8–46.1)
HCT VFR BLD AUTO: 48.2 % (ref 34.8–46.1)
HCT VFR BLD AUTO: 49 % (ref 34.8–46.1)
HCT VFR BLD AUTO: 49 % (ref 34.8–46.1)
HCT VFR BLD CALC: 41 % (ref 34.8–46.1)
HCT VFR BLD CALC: 45 % (ref 34.8–46.1)
HGB BLD-MCNC: 11.2 G/DL (ref 11.5–15.4)
HGB BLD-MCNC: 12.8 G/DL (ref 11.5–15.4)
HGB BLD-MCNC: 13.5 G/DL (ref 11.5–15.4)
HGB BLD-MCNC: 13.8 G/DL (ref 11.5–15.4)
HGB BLD-MCNC: 15.3 G/DL (ref 11.5–15.4)
HGB BLD-MCNC: 15.7 G/DL (ref 11.5–15.4)
HGB BLD-MCNC: 15.7 G/DL (ref 11.5–15.4)
HGB BLDA-MCNC: 13.9 G/DL (ref 11.5–15.4)
HGB BLDA-MCNC: 15.3 G/DL (ref 11.5–15.4)
HOROWITZ INDEX BLDA+IHG-RTO: 100 MM[HG]
IMM GRANULOCYTES # BLD AUTO: 0.04 THOUSAND/UL (ref 0–0.2)
IMM GRANULOCYTES # BLD AUTO: 0.05 THOUSAND/UL (ref 0–0.2)
IMM GRANULOCYTES # BLD AUTO: 0.18 THOUSAND/UL (ref 0–0.2)
IMM GRANULOCYTES # BLD AUTO: 0.31 THOUSAND/UL (ref 0–0.2)
IMM GRANULOCYTES # BLD AUTO: 0.5 THOUSAND/UL (ref 0–0.2)
IMM GRANULOCYTES NFR BLD AUTO: 0 % (ref 0–2)
IMM GRANULOCYTES NFR BLD AUTO: 0 % (ref 0–2)
IMM GRANULOCYTES NFR BLD AUTO: 1 % (ref 0–2)
IMM GRANULOCYTES NFR BLD AUTO: 2 % (ref 0–2)
IMM GRANULOCYTES NFR BLD AUTO: 2 % (ref 0–2)
INR PPP: 0.97 (ref 0.85–1.19)
INR PPP: 1.45 (ref 0.85–1.19)
INR PPP: 1.67 (ref 0.85–1.19)
INTERVENTRICULAR SEPTUM IN DIASTOLE (PARASTERNAL SHORT AXIS VIEW): 1.7 CM
INTERVENTRICULAR SEPTUM: 1.7 CM (ref 0.6–1.1)
IPAP: 20
L PNEUMO1 AG UR QL IA.RAPID: NEGATIVE
LAAS-AP2: 19.3 CM2
LAAS-AP4: 19.3 CM2
LACTATE SERPL-SCNC: 1.4 MMOL/L (ref 0.5–2)
LACTATE SERPL-SCNC: 1.7 MMOL/L (ref 0.5–2)
LACTATE SERPL-SCNC: 2 MMOL/L (ref 0.5–2)
LACTATE SERPL-SCNC: 2.5 MMOL/L (ref 0.5–2)
LACTATE SERPL-SCNC: 3.3 MMOL/L (ref 0.5–2)
LEFT ATRIUM AREA SYSTOLE SINGLE PLANE A4C: 19.1 CM2
LEFT ATRIUM VOLUME (MOD BIPLANE): 58 ML
LEFT ATRIUM VOLUME INDEX (MOD BIPLANE): 37.7 ML/M2
LEFT INTERNAL DIMENSION IN SYSTOLE: 2.7 CM (ref 2.1–4)
LEFT VENTRICULAR INTERNAL DIMENSION IN DIASTOLE: 3.7 CM (ref 3.5–6)
LEFT VENTRICULAR POSTERIOR WALL IN END DIASTOLE: 1.4 CM
LEFT VENTRICULAR STROKE VOLUME: 33 ML
LV EF US.2D.A4C+ESTIMATED: 57 %
LVSV (TEICH): 33 ML
LYMPHOCYTES # BLD AUTO: 0.39 THOUSANDS/ÂΜL (ref 0.6–4.47)
LYMPHOCYTES # BLD AUTO: 0.5 THOUSAND/UL (ref 0.6–4.47)
LYMPHOCYTES # BLD AUTO: 0.54 THOUSANDS/ÂΜL (ref 0.6–4.47)
LYMPHOCYTES # BLD AUTO: 0.77 THOUSANDS/ÂΜL (ref 0.6–4.47)
LYMPHOCYTES # BLD AUTO: 1.26 THOUSANDS/ÂΜL (ref 0.6–4.47)
LYMPHOCYTES # BLD AUTO: 1.26 THOUSANDS/ÂΜL (ref 0.6–4.47)
LYMPHOCYTES # BLD AUTO: 4 % (ref 14–44)
LYMPHOCYTES NFR BLD AUTO: 10 % (ref 14–44)
LYMPHOCYTES NFR BLD AUTO: 2 % (ref 14–44)
LYMPHOCYTES NFR BLD AUTO: 3 % (ref 14–44)
LYMPHOCYTES NFR BLD AUTO: 6 % (ref 14–44)
LYMPHOCYTES NFR BLD AUTO: 6 % (ref 14–44)
MAGNESIUM SERPL-MCNC: 2.5 MG/DL (ref 1.9–2.7)
MAGNESIUM SERPL-MCNC: 2.7 MG/DL (ref 1.9–2.7)
MAGNESIUM SERPL-MCNC: 2.9 MG/DL (ref 1.9–2.7)
MCH RBC QN AUTO: 29.5 PG (ref 26.8–34.3)
MCH RBC QN AUTO: 29.8 PG (ref 26.8–34.3)
MCH RBC QN AUTO: 29.9 PG (ref 26.8–34.3)
MCH RBC QN AUTO: 30 PG (ref 26.8–34.3)
MCH RBC QN AUTO: 30.1 PG (ref 26.8–34.3)
MCH RBC QN AUTO: 30.4 PG (ref 26.8–34.3)
MCH RBC QN AUTO: 30.4 PG (ref 26.8–34.3)
MCHC RBC AUTO-ENTMCNC: 29 G/DL (ref 31.4–37.4)
MCHC RBC AUTO-ENTMCNC: 30.5 G/DL (ref 31.4–37.4)
MCHC RBC AUTO-ENTMCNC: 31.5 G/DL (ref 31.4–37.4)
MCHC RBC AUTO-ENTMCNC: 31.7 G/DL (ref 31.4–37.4)
MCHC RBC AUTO-ENTMCNC: 32 G/DL (ref 31.4–37.4)
MCV RBC AUTO: 100 FL (ref 82–98)
MCV RBC AUTO: 105 FL (ref 82–98)
MCV RBC AUTO: 93 FL (ref 82–98)
MCV RBC AUTO: 94 FL (ref 82–98)
MONOCYTES # BLD AUTO: 0.38 THOUSAND/UL (ref 0–1.22)
MONOCYTES # BLD AUTO: 0.64 THOUSAND/ÂΜL (ref 0.17–1.22)
MONOCYTES # BLD AUTO: 0.74 THOUSAND/ÂΜL (ref 0.17–1.22)
MONOCYTES # BLD AUTO: 0.81 THOUSAND/ÂΜL (ref 0.17–1.22)
MONOCYTES # BLD AUTO: 0.81 THOUSAND/ÂΜL (ref 0.17–1.22)
MONOCYTES # BLD AUTO: 0.96 THOUSAND/ÂΜL (ref 0.17–1.22)
MONOCYTES NFR BLD AUTO: 4 % (ref 4–12)
MONOCYTES NFR BLD AUTO: 4 % (ref 4–12)
MONOCYTES NFR BLD AUTO: 5 % (ref 4–12)
MONOCYTES NFR BLD AUTO: 5 % (ref 4–12)
MONOCYTES NFR BLD AUTO: 8 % (ref 4–12)
MONOCYTES NFR BLD: 3 % (ref 4–12)
MRSA NOSE QL CULT: NORMAL
MV E'TISSUE VEL-LAT: 8 CM/S
MV E'TISSUE VEL-SEP: 5 CM/S
MV PEAK A VEL: 0.88 M/S
MV PEAK E VEL: 51 CM/S
MV STENOSIS PRESSURE HALF TIME: 48 MS
MV VALVE AREA P 1/2 METHOD: 4.58
NEUTROPHILS # BLD AUTO: 10.71 THOUSANDS/ÂΜL (ref 1.85–7.62)
NEUTROPHILS # BLD AUTO: 15.77 THOUSANDS/ÂΜL (ref 1.85–7.62)
NEUTROPHILS # BLD AUTO: 16.46 THOUSANDS/ÂΜL (ref 1.85–7.62)
NEUTROPHILS # BLD AUTO: 18.96 THOUSANDS/ÂΜL (ref 1.85–7.62)
NEUTROPHILS # BLD AUTO: 9.76 THOUSANDS/ÂΜL (ref 1.85–7.62)
NEUTROPHILS # BLD MANUAL: 11.53 THOUSAND/UL (ref 1.85–7.62)
NEUTS BAND NFR BLD MANUAL: 7 % (ref 0–8)
NEUTS SEG NFR BLD AUTO: 81 % (ref 43–75)
NEUTS SEG NFR BLD AUTO: 85 % (ref 43–75)
NEUTS SEG NFR BLD AUTO: 87 % (ref 43–75)
NEUTS SEG NFR BLD AUTO: 89 % (ref 43–75)
NEUTS SEG NFR BLD AUTO: 91 % (ref 43–75)
NEUTS SEG NFR BLD AUTO: 92 % (ref 43–75)
NON VENT- BIPAP: ABNORMAL
NON VENT- BIPAP: ABNORMAL
NRBC BLD AUTO-RTO: 0 /100 WBCS
O2 CT BLDA-SCNC: 18.8 ML/DL (ref 16–23)
O2 CT BLDV-SCNC: 12.7 ML/DL
O2 CT BLDV-SCNC: 13.9 ML/DL
O2 CT BLDV-SCNC: 14.1 ML/DL
O2 CT BLDV-SCNC: 16.4 ML/DL
OXYHGB MFR BLDA: 97.7 % (ref 94–97)
P AXIS: -14 DEGREES
P AXIS: -19 DEGREES
P AXIS: 0 DEGREES
P AXIS: 52 DEGREES
P AXIS: 69 DEGREES
P AXIS: 81 DEGREES
PCO2 BLD: 35 MMOL/L (ref 21–32)
PCO2 BLD: 37.7 MM HG (ref 36–44)
PCO2 BLD: 42 MMOL/L (ref 21–32)
PCO2 BLD: 94.9 MM HG (ref 36–44)
PCO2 BLDA: 37.3 MM HG (ref 36–44)
PCO2 BLDV: 138.3 MM HG (ref 42–50)
PCO2 BLDV: 47.5 MM HG (ref 42–50)
PCO2 BLDV: 68.1 MM HG (ref 42–50)
PCO2 BLDV: 93 MM HG (ref 42–50)
PEEP MAX SETTING VENT: 6 CM[H2O]
PEEP RESPIRATORY: 6 CM[H2O]
PH BLD: 7.22 [PH] (ref 7.35–7.45)
PH BLD: 7.57 [PH] (ref 7.35–7.45)
PH BLDA: 7.56 [PH] (ref 7.35–7.45)
PH BLDV: 7.07 [PH] (ref 7.3–7.4)
PH BLDV: 7.21 [PH] (ref 7.3–7.4)
PH BLDV: 7.33 [PH] (ref 7.3–7.4)
PH BLDV: 7.48 [PH] (ref 7.3–7.4)
PHOSPHATE SERPL-MCNC: 2.1 MG/DL (ref 2.3–4.1)
PHOSPHATE SERPL-MCNC: 2.7 MG/DL (ref 2.3–4.1)
PHOSPHATE SERPL-MCNC: 3.9 MG/DL (ref 2.3–4.1)
PLATELET # BLD AUTO: 208 THOUSANDS/UL (ref 149–390)
PLATELET # BLD AUTO: 215 THOUSANDS/UL (ref 149–390)
PLATELET # BLD AUTO: 216 THOUSANDS/UL (ref 149–390)
PLATELET # BLD AUTO: 254 THOUSANDS/UL (ref 149–390)
PLATELET # BLD AUTO: 260 THOUSANDS/UL (ref 149–390)
PLATELET # BLD AUTO: 274 THOUSANDS/UL (ref 149–390)
PLATELET # BLD AUTO: 280 THOUSANDS/UL (ref 149–390)
PLATELET BLD QL SMEAR: ADEQUATE
PMV BLD AUTO: 10.3 FL (ref 8.9–12.7)
PMV BLD AUTO: 10.4 FL (ref 8.9–12.7)
PMV BLD AUTO: 10.6 FL (ref 8.9–12.7)
PMV BLD AUTO: 10.6 FL (ref 8.9–12.7)
PMV BLD AUTO: 10.7 FL (ref 8.9–12.7)
PMV BLD AUTO: 11 FL (ref 8.9–12.7)
PMV BLD AUTO: 11 FL (ref 8.9–12.7)
PO2 BLD: 72 MM HG (ref 75–129)
PO2 BLD: 83 MM HG (ref 75–129)
PO2 BLDA: 94.8 MM HG (ref 75–129)
PO2 BLDV: 31 MM HG (ref 35–45)
PO2 BLDV: 35 MM HG (ref 35–45)
PO2 BLDV: 37.5 MM HG (ref 35–45)
PO2 BLDV: 55.7 MM HG (ref 35–45)
POTASSIUM BLD-SCNC: 3.5 MMOL/L (ref 3.5–5.3)
POTASSIUM BLD-SCNC: 3.9 MMOL/L (ref 3.5–5.3)
POTASSIUM SERPL-SCNC: 3.1 MMOL/L (ref 3.5–5.3)
POTASSIUM SERPL-SCNC: 3.5 MMOL/L (ref 3.5–5.3)
POTASSIUM SERPL-SCNC: 3.7 MMOL/L (ref 3.5–5.3)
POTASSIUM SERPL-SCNC: 3.9 MMOL/L (ref 3.5–5.3)
POTASSIUM SERPL-SCNC: 4.1 MMOL/L (ref 3.5–5.3)
POTASSIUM SERPL-SCNC: 4.2 MMOL/L (ref 3.5–5.3)
POTASSIUM SERPL-SCNC: 4.5 MMOL/L (ref 3.5–5.3)
PR INTERVAL: 0 MS
PR INTERVAL: 117 MS
PR INTERVAL: 124 MS
PR INTERVAL: 126 MS
PR INTERVAL: 136 MS
PR INTERVAL: 142 MS
PROCALCITONIN SERPL-MCNC: 0.07 NG/ML
PROCALCITONIN SERPL-MCNC: 0.13 NG/ML
PROCALCITONIN SERPL-MCNC: 0.84 NG/ML
PROT SERPL-MCNC: 4.9 G/DL (ref 6.4–8.4)
PROT SERPL-MCNC: 6.3 G/DL (ref 6.4–8.4)
PROT SERPL-MCNC: 6.6 G/DL (ref 6.4–8.4)
PROT SERPL-MCNC: 7.3 G/DL (ref 6.4–8.4)
PROTHROMBIN TIME: 13.1 SECONDS (ref 12.3–15)
PROTHROMBIN TIME: 17.7 SECONDS (ref 12.3–15)
PROTHROMBIN TIME: 19.7 SECONDS (ref 12.3–15)
QRS AXIS: -15 DEGREES
QRS AXIS: 105 DEGREES
QRS AXIS: 107 DEGREES
QRS AXIS: 22 DEGREES
QRS AXIS: 31 DEGREES
QRS AXIS: 38 DEGREES
QRS AXIS: 46 DEGREES
QRS AXIS: 60 DEGREES
QRS AXIS: 64 DEGREES
QRS AXIS: 68 DEGREES
QRS AXIS: 74 DEGREES
QRSD INTERVAL: 62 MS
QRSD INTERVAL: 64 MS
QRSD INTERVAL: 66 MS
QRSD INTERVAL: 68 MS
QRSD INTERVAL: 70 MS
QRSD INTERVAL: 72 MS
QRSD INTERVAL: 76 MS
QRSD INTERVAL: 79 MS
QRSD INTERVAL: 96 MS
QT INTERVAL: 188 MS
QT INTERVAL: 196 MS
QT INTERVAL: 206 MS
QT INTERVAL: 244 MS
QT INTERVAL: 252 MS
QT INTERVAL: 288 MS
QT INTERVAL: 340 MS
QT INTERVAL: 362 MS
QT INTERVAL: 368 MS
QT INTERVAL: 414 MS
QT INTERVAL: 421 MS
QTC INTERVAL: 353 MS
QTC INTERVAL: 370 MS
QTC INTERVAL: 371 MS
QTC INTERVAL: 399 MS
QTC INTERVAL: 408 MS
QTC INTERVAL: 411 MS
QTC INTERVAL: 417 MS
QTC INTERVAL: 424 MS
QTC INTERVAL: 433 MS
QTC INTERVAL: 440 MS
QTC INTERVAL: 459 MS
RBC # BLD AUTO: 3.72 MILLION/UL (ref 3.81–5.12)
RBC # BLD AUTO: 4.21 MILLION/UL (ref 3.81–5.12)
RBC # BLD AUTO: 4.54 MILLION/UL (ref 3.81–5.12)
RBC # BLD AUTO: 4.57 MILLION/UL (ref 3.81–5.12)
RBC # BLD AUTO: 5.11 MILLION/UL (ref 3.81–5.12)
RBC # BLD AUTO: 5.23 MILLION/UL (ref 3.81–5.12)
RBC # BLD AUTO: 5.26 MILLION/UL (ref 3.81–5.12)
RBC MORPH BLD: NORMAL
RIGHT ATRIUM AREA SYSTOLE A4C: 19.4 CM2
RIGHT VENTRICLE ID DIMENSION: 3.8 CM
RSV RNA RESP QL NAA+PROBE: NEGATIVE
S PNEUM AG UR QL: NEGATIVE
SAO2 % BLD FROM PO2: 89 % (ref 60–85)
SAO2 % BLD FROM PO2: 97 % (ref 60–85)
SARS-COV-2 RNA RESP QL NAA+PROBE: NEGATIVE
SIMV VENT INSPIRED AIR FIO2: 70
SIMV VENT PEEP: 6
SIMV VENT TIDAL VOLUME: 360
SIMV VENT: ABNORMAL
SL CV AV DECELERATION TIME RETROGRADE: 1459 MS
SL CV AV PEAK GRADIENT RETROGRADE: 26 MMHG
SL CV LEFT ATRIUM LENGTH A2C: 4.9 CM
SL CV PED ECHO LEFT VENTRICLE DIASTOLIC VOLUME (MOD BIPLANE) 2D: 60 ML
SL CV PED ECHO LEFT VENTRICLE SYSTOLIC VOLUME (MOD BIPLANE) 2D: 27 ML
SODIUM BLD-SCNC: 137 MMOL/L (ref 136–145)
SODIUM BLD-SCNC: 140 MMOL/L (ref 136–145)
SODIUM SERPL-SCNC: 138 MMOL/L (ref 135–147)
SODIUM SERPL-SCNC: 139 MMOL/L (ref 135–147)
SODIUM SERPL-SCNC: 141 MMOL/L (ref 135–147)
SODIUM SERPL-SCNC: 143 MMOL/L (ref 135–147)
SODIUM SERPL-SCNC: 143 MMOL/L (ref 135–147)
SPECIMEN SOURCE: ABNORMAL
T WAVE AXIS: -29 DEGREES
T WAVE AXIS: -87 DEGREES
T WAVE AXIS: 204 DEGREES
T WAVE AXIS: 219 DEGREES
T WAVE AXIS: 225 DEGREES
T WAVE AXIS: 45 DEGREES
T WAVE AXIS: 49 DEGREES
T WAVE AXIS: 49 DEGREES
T WAVE AXIS: 51 DEGREES
T WAVE AXIS: 72 DEGREES
T WAVE AXIS: 79 DEGREES
TR MAX PG: 12 MMHG
TR PEAK VELOCITY: 1.8 M/S
TRICUSPID ANNULAR PLANE SYSTOLIC EXCURSION: 1.8 CM
TRICUSPID VALVE PEAK REGURGITATION VELOCITY: 1.75 M/S
TSH SERPL DL<=0.05 MIU/L-ACNC: 1.64 UIU/ML (ref 0.45–4.5)
VENT AC: 22
VENT BIPAP FIO2: 20 %
VENT SIMV: 18
VENT- AC: AC
VENTRICULAR RATE: 101 BPM
VENTRICULAR RATE: 122 BPM
VENTRICULAR RATE: 158 BPM
VENTRICULAR RATE: 182 BPM
VENTRICULAR RATE: 195 BPM
VENTRICULAR RATE: 212 BPM
VENTRICULAR RATE: 214 BPM
VENTRICULAR RATE: 59 BPM
VENTRICULAR RATE: 71 BPM
VENTRICULAR RATE: 74 BPM
VENTRICULAR RATE: 86 BPM
VT SETTING VENT: 500 ML
WBC # BLD AUTO: 12.12 THOUSAND/UL (ref 4.31–10.16)
WBC # BLD AUTO: 12.21 THOUSAND/UL (ref 4.31–10.16)
WBC # BLD AUTO: 12.53 THOUSAND/UL (ref 4.31–10.16)
WBC # BLD AUTO: 12.99 THOUSAND/UL (ref 4.31–10.16)
WBC # BLD AUTO: 17.27 THOUSAND/UL (ref 4.31–10.16)
WBC # BLD AUTO: 18.01 THOUSAND/UL (ref 4.31–10.16)
WBC # BLD AUTO: 21.66 THOUSAND/UL (ref 4.31–10.16)

## 2025-01-01 PROCEDURE — 84484 ASSAY OF TROPONIN QUANT: CPT | Performed by: EMERGENCY MEDICINE

## 2025-01-01 PROCEDURE — 80053 COMPREHEN METABOLIC PANEL: CPT | Performed by: NURSE PRACTITIONER

## 2025-01-01 PROCEDURE — 99285 EMERGENCY DEPT VISIT HI MDM: CPT

## 2025-01-01 PROCEDURE — 85027 COMPLETE CBC AUTOMATED: CPT | Performed by: INTERNAL MEDICINE

## 2025-01-01 PROCEDURE — 82948 REAGENT STRIP/BLOOD GLUCOSE: CPT

## 2025-01-01 PROCEDURE — 94003 VENT MGMT INPAT SUBQ DAY: CPT

## 2025-01-01 PROCEDURE — NC001 PR NO CHARGE: Performed by: INTERNAL MEDICINE

## 2025-01-01 PROCEDURE — 83605 ASSAY OF LACTIC ACID: CPT | Performed by: INTERNAL MEDICINE

## 2025-01-01 PROCEDURE — 36556 INSERT NON-TUNNEL CV CATH: CPT

## 2025-01-01 PROCEDURE — 85610 PROTHROMBIN TIME: CPT

## 2025-01-01 PROCEDURE — 36600 WITHDRAWAL OF ARTERIAL BLOOD: CPT

## 2025-01-01 PROCEDURE — 84145 PROCALCITONIN (PCT): CPT | Performed by: INTERNAL MEDICINE

## 2025-01-01 PROCEDURE — 83735 ASSAY OF MAGNESIUM: CPT

## 2025-01-01 PROCEDURE — 99223 1ST HOSP IP/OBS HIGH 75: CPT | Performed by: INTERNAL MEDICINE

## 2025-01-01 PROCEDURE — 85610 PROTHROMBIN TIME: CPT | Performed by: INTERNAL MEDICINE

## 2025-01-01 PROCEDURE — 99232 SBSQ HOSP IP/OBS MODERATE 35: CPT

## 2025-01-01 PROCEDURE — 93005 ELECTROCARDIOGRAM TRACING: CPT

## 2025-01-01 PROCEDURE — 0241U HB NFCT DS VIR RESP RNA 4 TRGT: CPT | Performed by: EMERGENCY MEDICINE

## 2025-01-01 PROCEDURE — 99291 CRITICAL CARE FIRST HOUR: CPT | Performed by: INTERNAL MEDICINE

## 2025-01-01 PROCEDURE — 94002 VENT MGMT INPAT INIT DAY: CPT

## 2025-01-01 PROCEDURE — 93010 ELECTROCARDIOGRAM REPORT: CPT

## 2025-01-01 PROCEDURE — 84443 ASSAY THYROID STIM HORMONE: CPT

## 2025-01-01 PROCEDURE — 82805 BLOOD GASES W/O2 SATURATION: CPT

## 2025-01-01 PROCEDURE — 87040 BLOOD CULTURE FOR BACTERIA: CPT

## 2025-01-01 PROCEDURE — 87081 CULTURE SCREEN ONLY: CPT

## 2025-01-01 PROCEDURE — 99285 EMERGENCY DEPT VISIT HI MDM: CPT | Performed by: EMERGENCY MEDICINE

## 2025-01-01 PROCEDURE — 85730 THROMBOPLASTIN TIME PARTIAL: CPT

## 2025-01-01 PROCEDURE — 84100 ASSAY OF PHOSPHORUS: CPT

## 2025-01-01 PROCEDURE — 87040 BLOOD CULTURE FOR BACTERIA: CPT | Performed by: INTERNAL MEDICINE

## 2025-01-01 PROCEDURE — 0BH17EZ INSERTION OF ENDOTRACHEAL AIRWAY INTO TRACHEA, VIA NATURAL OR ARTIFICIAL OPENING: ICD-10-PCS | Performed by: INTERNAL MEDICINE

## 2025-01-01 PROCEDURE — 93010 ELECTROCARDIOGRAM REPORT: CPT | Performed by: INTERNAL MEDICINE

## 2025-01-01 PROCEDURE — 71275 CT ANGIOGRAPHY CHEST: CPT

## 2025-01-01 PROCEDURE — 83735 ASSAY OF MAGNESIUM: CPT | Performed by: NURSE PRACTITIONER

## 2025-01-01 PROCEDURE — 94669 MECHANICAL CHEST WALL OSCILL: CPT

## 2025-01-01 PROCEDURE — 05HM33Z INSERTION OF INFUSION DEVICE INTO RIGHT INTERNAL JUGULAR VEIN, PERCUTANEOUS APPROACH: ICD-10-PCS | Performed by: INTERNAL MEDICINE

## 2025-01-01 PROCEDURE — 82805 BLOOD GASES W/O2 SATURATION: CPT | Performed by: NURSE PRACTITIONER

## 2025-01-01 PROCEDURE — 94668 MNPJ CHEST WALL SBSQ: CPT

## 2025-01-01 PROCEDURE — 82803 BLOOD GASES ANY COMBINATION: CPT

## 2025-01-01 PROCEDURE — 82550 ASSAY OF CK (CPK): CPT

## 2025-01-01 PROCEDURE — 31500 INSERT EMERGENCY AIRWAY: CPT

## 2025-01-01 PROCEDURE — 87449 NOS EACH ORGANISM AG IA: CPT

## 2025-01-01 PROCEDURE — 83036 HEMOGLOBIN GLYCOSYLATED A1C: CPT | Performed by: NURSE PRACTITIONER

## 2025-01-01 PROCEDURE — 71046 X-RAY EXAM CHEST 2 VIEWS: CPT

## 2025-01-01 PROCEDURE — 71045 X-RAY EXAM CHEST 1 VIEW: CPT

## 2025-01-01 PROCEDURE — 84484 ASSAY OF TROPONIN QUANT: CPT

## 2025-01-01 PROCEDURE — 85027 COMPLETE CBC AUTOMATED: CPT

## 2025-01-01 PROCEDURE — 82330 ASSAY OF CALCIUM: CPT

## 2025-01-01 PROCEDURE — 85379 FIBRIN DEGRADATION QUANT: CPT | Performed by: EMERGENCY MEDICINE

## 2025-01-01 PROCEDURE — 83605 ASSAY OF LACTIC ACID: CPT

## 2025-01-01 PROCEDURE — 80053 COMPREHEN METABOLIC PANEL: CPT

## 2025-01-01 PROCEDURE — 85007 BL SMEAR W/DIFF WBC COUNT: CPT | Performed by: INTERNAL MEDICINE

## 2025-01-01 PROCEDURE — 84132 ASSAY OF SERUM POTASSIUM: CPT

## 2025-01-01 PROCEDURE — 94760 N-INVAS EAR/PLS OXIMETRY 1: CPT

## 2025-01-01 PROCEDURE — 82947 ASSAY GLUCOSE BLOOD QUANT: CPT

## 2025-01-01 PROCEDURE — 99232 SBSQ HOSP IP/OBS MODERATE 35: CPT | Performed by: INTERNAL MEDICINE

## 2025-01-01 PROCEDURE — 5A1935Z RESPIRATORY VENTILATION, LESS THAN 24 CONSECUTIVE HOURS: ICD-10-PCS | Performed by: INTERNAL MEDICINE

## 2025-01-01 PROCEDURE — 85730 THROMBOPLASTIN TIME PARTIAL: CPT | Performed by: INTERNAL MEDICINE

## 2025-01-01 PROCEDURE — 80048 BASIC METABOLIC PNL TOTAL CA: CPT | Performed by: NURSE PRACTITIONER

## 2025-01-01 PROCEDURE — 94150 VITAL CAPACITY TEST: CPT

## 2025-01-01 PROCEDURE — 85027 COMPLETE CBC AUTOMATED: CPT | Performed by: NURSE PRACTITIONER

## 2025-01-01 PROCEDURE — 85025 COMPLETE CBC W/AUTO DIFF WBC: CPT | Performed by: EMERGENCY MEDICINE

## 2025-01-01 PROCEDURE — 80048 BASIC METABOLIC PNL TOTAL CA: CPT | Performed by: EMERGENCY MEDICINE

## 2025-01-01 PROCEDURE — NC001 PR NO CHARGE

## 2025-01-01 PROCEDURE — 99232 SBSQ HOSP IP/OBS MODERATE 35: CPT | Performed by: PHYSICIAN ASSISTANT

## 2025-01-01 PROCEDURE — 94640 AIRWAY INHALATION TREATMENT: CPT

## 2025-01-01 PROCEDURE — 94664 DEMO&/EVAL PT USE INHALER: CPT

## 2025-01-01 PROCEDURE — 93306 TTE W/DOPPLER COMPLETE: CPT | Performed by: INTERNAL MEDICINE

## 2025-01-01 PROCEDURE — 80053 COMPREHEN METABOLIC PANEL: CPT | Performed by: EMERGENCY MEDICINE

## 2025-01-01 PROCEDURE — 84100 ASSAY OF PHOSPHORUS: CPT | Performed by: NURSE PRACTITIONER

## 2025-01-01 PROCEDURE — 93306 TTE W/DOPPLER COMPLETE: CPT

## 2025-01-01 PROCEDURE — 82533 TOTAL CORTISOL: CPT

## 2025-01-01 PROCEDURE — 80048 BASIC METABOLIC PNL TOTAL CA: CPT | Performed by: INTERNAL MEDICINE

## 2025-01-01 PROCEDURE — 84295 ASSAY OF SERUM SODIUM: CPT

## 2025-01-01 PROCEDURE — 83880 ASSAY OF NATRIURETIC PEPTIDE: CPT | Performed by: EMERGENCY MEDICINE

## 2025-01-01 PROCEDURE — 82330 ASSAY OF CALCIUM: CPT | Performed by: NURSE PRACTITIONER

## 2025-01-01 PROCEDURE — 82805 BLOOD GASES W/O2 SATURATION: CPT | Performed by: STUDENT IN AN ORGANIZED HEALTH CARE EDUCATION/TRAINING PROGRAM

## 2025-01-01 PROCEDURE — 74177 CT ABD & PELVIS W/CONTRAST: CPT

## 2025-01-01 PROCEDURE — 99222 1ST HOSP IP/OBS MODERATE 55: CPT | Performed by: INTERNAL MEDICINE

## 2025-01-01 PROCEDURE — 84145 PROCALCITONIN (PCT): CPT

## 2025-01-01 PROCEDURE — 97167 OT EVAL HIGH COMPLEX 60 MIN: CPT

## 2025-01-01 PROCEDURE — 31500 INSERT EMERGENCY AIRWAY: CPT | Performed by: INTERNAL MEDICINE

## 2025-01-01 PROCEDURE — 36415 COLL VENOUS BLD VENIPUNCTURE: CPT | Performed by: EMERGENCY MEDICINE

## 2025-01-01 PROCEDURE — 85014 HEMATOCRIT: CPT

## 2025-01-01 RX ORDER — AZELASTINE 1 MG/ML
1 SPRAY, METERED NASAL 2 TIMES DAILY
Qty: 1 ML | Refills: 0 | Status: SHIPPED | OUTPATIENT
Start: 2025-01-01 | End: 2025-03-22

## 2025-01-01 RX ORDER — PANTOPRAZOLE SODIUM 40 MG/10ML
40 INJECTION, POWDER, LYOPHILIZED, FOR SOLUTION INTRAVENOUS
Status: DISCONTINUED | OUTPATIENT
Start: 2025-01-01 | End: 2025-01-01

## 2025-01-01 RX ORDER — SODIUM CHLORIDE, SODIUM GLUCONATE, SODIUM ACETATE, POTASSIUM CHLORIDE, MAGNESIUM CHLORIDE, SODIUM PHOSPHATE, DIBASIC, AND POTASSIUM PHOSPHATE .53; .5; .37; .037; .03; .012; .00082 G/100ML; G/100ML; G/100ML; G/100ML; G/100ML; G/100ML; G/100ML
100 INJECTION, SOLUTION INTRAVENOUS CONTINUOUS
Status: DISCONTINUED | OUTPATIENT
Start: 2025-01-01 | End: 2025-01-01

## 2025-01-01 RX ORDER — HEPARIN SODIUM 1000 [USP'U]/ML
3300 INJECTION, SOLUTION INTRAVENOUS; SUBCUTANEOUS ONCE
Status: COMPLETED | OUTPATIENT
Start: 2025-01-01 | End: 2025-01-01

## 2025-01-01 RX ORDER — HEPARIN SODIUM 10000 [USP'U]/100ML
3-20 INJECTION, SOLUTION INTRAVENOUS
Status: DISCONTINUED | OUTPATIENT
Start: 2025-01-01 | End: 2025-01-01

## 2025-01-01 RX ORDER — INSULIN LISPRO 100 [IU]/ML
1-5 INJECTION, SOLUTION INTRAVENOUS; SUBCUTANEOUS
Status: DISCONTINUED | OUTPATIENT
Start: 2025-01-01 | End: 2025-01-01 | Stop reason: HOSPADM

## 2025-01-01 RX ORDER — METOPROLOL TARTRATE 1 MG/ML
5 INJECTION, SOLUTION INTRAVENOUS ONCE
Status: DISCONTINUED | OUTPATIENT
Start: 2025-01-01 | End: 2025-01-01

## 2025-01-01 RX ORDER — HEPARIN SODIUM 1000 [USP'U]/ML
3300 INJECTION, SOLUTION INTRAVENOUS; SUBCUTANEOUS EVERY 6 HOURS PRN
Status: DISCONTINUED | OUTPATIENT
Start: 2025-01-01 | End: 2025-01-01

## 2025-01-01 RX ORDER — CHLORHEXIDINE GLUCONATE ORAL RINSE 1.2 MG/ML
15 SOLUTION DENTAL EVERY 12 HOURS SCHEDULED
Status: DISCONTINUED | OUTPATIENT
Start: 2025-01-01 | End: 2025-01-01 | Stop reason: HOSPADM

## 2025-01-01 RX ORDER — HEPARIN SODIUM 1000 [USP'U]/ML
3000 INJECTION, SOLUTION INTRAVENOUS; SUBCUTANEOUS EVERY 6 HOURS PRN
Status: DISCONTINUED | OUTPATIENT
Start: 2025-01-01 | End: 2025-01-01

## 2025-01-01 RX ORDER — METOPROLOL TARTRATE 1 MG/ML
5 INJECTION, SOLUTION INTRAVENOUS EVERY 6 HOURS PRN
Status: DISCONTINUED | OUTPATIENT
Start: 2025-01-01 | End: 2025-01-01

## 2025-01-01 RX ORDER — DILTIAZEM HYDROCHLORIDE 5 MG/ML
INJECTION INTRAVENOUS
Status: DISPENSED
Start: 2025-01-01 | End: 2025-01-01

## 2025-01-01 RX ORDER — SODIUM CHLORIDE FOR INHALATION 3 %
4 VIAL, NEBULIZER (ML) INHALATION 3 TIMES DAILY
Status: DISCONTINUED | OUTPATIENT
Start: 2025-01-01 | End: 2025-01-01 | Stop reason: HOSPADM

## 2025-01-01 RX ORDER — AMIODARONE HYDROCHLORIDE 200 MG/1
200 TABLET ORAL
Status: DISCONTINUED | OUTPATIENT
Start: 2025-01-01 | End: 2025-01-01

## 2025-01-01 RX ORDER — HEPARIN SODIUM 1000 [USP'U]/ML
1650 INJECTION, SOLUTION INTRAVENOUS; SUBCUTANEOUS EVERY 6 HOURS PRN
Status: DISCONTINUED | OUTPATIENT
Start: 2025-01-01 | End: 2025-01-01

## 2025-01-01 RX ORDER — FENTANYL CITRATE 50 UG/ML
INJECTION, SOLUTION INTRAMUSCULAR; INTRAVENOUS
Status: COMPLETED
Start: 2025-01-01 | End: 2025-01-01

## 2025-01-01 RX ORDER — INSULIN LISPRO 100 [IU]/ML
1-5 INJECTION, SOLUTION INTRAVENOUS; SUBCUTANEOUS
Status: DISCONTINUED | OUTPATIENT
Start: 2025-01-01 | End: 2025-01-01

## 2025-01-01 RX ORDER — CHLORHEXIDINE GLUCONATE ORAL RINSE 1.2 MG/ML
15 SOLUTION DENTAL EVERY 12 HOURS SCHEDULED
Status: DISCONTINUED | OUTPATIENT
Start: 2025-01-01 | End: 2025-01-01

## 2025-01-01 RX ORDER — GUAIFENESIN 600 MG/1
600 TABLET, EXTENDED RELEASE ORAL EVERY 12 HOURS SCHEDULED
Status: DISCONTINUED | OUTPATIENT
Start: 2025-01-01 | End: 2025-01-01

## 2025-01-01 RX ORDER — ENOXAPARIN SODIUM 100 MG/ML
1 INJECTION SUBCUTANEOUS EVERY 12 HOURS SCHEDULED
Status: DISCONTINUED | OUTPATIENT
Start: 2025-01-01 | End: 2025-01-01 | Stop reason: HOSPADM

## 2025-01-01 RX ORDER — GUAIFENESIN 600 MG/1
600 TABLET, EXTENDED RELEASE ORAL 2 TIMES DAILY
Qty: 10 TABLET | Refills: 0 | Status: SHIPPED | OUTPATIENT
Start: 2025-01-01 | End: 2025-03-22

## 2025-01-01 RX ORDER — LEVOTHYROXINE SODIUM 50 UG/1
50 TABLET ORAL
Status: DISCONTINUED | OUTPATIENT
Start: 2025-01-01 | End: 2025-01-01 | Stop reason: HOSPADM

## 2025-01-01 RX ORDER — POTASSIUM CHLORIDE 20MEQ/15ML
40 LIQUID (ML) ORAL ONCE
Status: COMPLETED | OUTPATIENT
Start: 2025-01-01 | End: 2025-01-01

## 2025-01-01 RX ORDER — AMIODARONE HYDROCHLORIDE 200 MG/1
200 TABLET ORAL
Status: DISCONTINUED | OUTPATIENT
Start: 2025-03-28 | End: 2025-01-01

## 2025-01-01 RX ORDER — DILTIAZEM HYDROCHLORIDE 5 MG/ML
15 INJECTION INTRAVENOUS ONCE
Status: COMPLETED | OUTPATIENT
Start: 2025-01-01 | End: 2025-01-01

## 2025-01-01 RX ORDER — FENTANYL CITRATE-0.9 % NACL/PF 10 MCG/ML
75 PLASTIC BAG, INJECTION (ML) INTRAVENOUS CONTINUOUS
Status: DISCONTINUED | OUTPATIENT
Start: 2025-01-01 | End: 2025-01-01

## 2025-01-01 RX ORDER — METOPROLOL TARTRATE 25 MG/1
25 TABLET, FILM COATED ORAL EVERY 12 HOURS SCHEDULED
Status: DISCONTINUED | OUTPATIENT
Start: 2025-01-01 | End: 2025-01-01

## 2025-01-01 RX ORDER — AMIODARONE HYDROCHLORIDE 200 MG/1
200 TABLET ORAL
Status: DISCONTINUED | OUTPATIENT
Start: 2025-03-27 | End: 2025-01-01

## 2025-01-01 RX ORDER — POTASSIUM CHLORIDE 14.9 MG/ML
20 INJECTION INTRAVENOUS ONCE
Status: COMPLETED | OUTPATIENT
Start: 2025-01-01 | End: 2025-01-01

## 2025-01-01 RX ORDER — PROPOFOL 10 MG/ML
5-50 INJECTION, EMULSION INTRAVENOUS
Status: DISCONTINUED | OUTPATIENT
Start: 2025-01-01 | End: 2025-01-01

## 2025-01-01 RX ORDER — IPRATROPIUM BROMIDE AND ALBUTEROL SULFATE 2.5; .5 MG/3ML; MG/3ML
3 SOLUTION RESPIRATORY (INHALATION) ONCE
Status: COMPLETED | OUTPATIENT
Start: 2025-01-01 | End: 2025-01-01

## 2025-01-01 RX ORDER — SODIUM CHLORIDE FOR INHALATION 3 %
4 VIAL, NEBULIZER (ML) INHALATION
Status: DISCONTINUED | OUTPATIENT
Start: 2025-01-01 | End: 2025-01-01

## 2025-01-01 RX ORDER — FENTANYL CITRATE 50 UG/ML
50 INJECTION, SOLUTION INTRAMUSCULAR; INTRAVENOUS
Refills: 0 | Status: DISCONTINUED | OUTPATIENT
Start: 2025-01-01 | End: 2025-01-01

## 2025-01-01 RX ORDER — PRAVASTATIN SODIUM 40 MG
40 TABLET ORAL
Status: DISCONTINUED | OUTPATIENT
Start: 2025-01-01 | End: 2025-01-01 | Stop reason: HOSPADM

## 2025-01-01 RX ORDER — IPRATROPIUM BROMIDE AND ALBUTEROL SULFATE .5; 3 MG/3ML; MG/3ML
2 SOLUTION RESPIRATORY (INHALATION) ONCE
Status: COMPLETED | OUTPATIENT
Start: 2025-01-01 | End: 2025-01-01

## 2025-01-01 RX ORDER — INSULIN LISPRO 100 [IU]/ML
1-5 INJECTION, SOLUTION INTRAVENOUS; SUBCUTANEOUS EVERY 6 HOURS SCHEDULED
Status: DISCONTINUED | OUTPATIENT
Start: 2025-01-01 | End: 2025-01-01

## 2025-01-01 RX ORDER — SODIUM CHLORIDE, SODIUM GLUCONATE, SODIUM ACETATE, POTASSIUM CHLORIDE, MAGNESIUM CHLORIDE, SODIUM PHOSPHATE, DIBASIC, AND POTASSIUM PHOSPHATE .53; .5; .37; .037; .03; .012; .00082 G/100ML; G/100ML; G/100ML; G/100ML; G/100ML; G/100ML; G/100ML
1000 INJECTION, SOLUTION INTRAVENOUS ONCE
Status: COMPLETED | OUTPATIENT
Start: 2025-01-01 | End: 2025-01-01

## 2025-01-01 RX ORDER — HYDROCORTISONE SODIUM SUCCINATE 100 MG/2ML
100 INJECTION INTRAMUSCULAR; INTRAVENOUS ONCE
Status: DISCONTINUED | OUTPATIENT
Start: 2025-01-01 | End: 2025-01-01 | Stop reason: HOSPADM

## 2025-01-01 RX ORDER — DILTIAZEM HYDROCHLORIDE 5 MG/ML
10 INJECTION INTRAVENOUS EVERY 4 HOURS PRN
Status: DISCONTINUED | OUTPATIENT
Start: 2025-01-01 | End: 2025-01-01

## 2025-01-01 RX ORDER — MAGNESIUM SULFATE HEPTAHYDRATE 40 MG/ML
2 INJECTION, SOLUTION INTRAVENOUS ONCE
Status: COMPLETED | OUTPATIENT
Start: 2025-01-01 | End: 2025-01-01

## 2025-01-01 RX ORDER — EPINEPHRINE 1 MG/ML
INJECTION, SOLUTION, CONCENTRATE INTRAVENOUS
Status: COMPLETED
Start: 2025-01-01 | End: 2025-01-01

## 2025-01-01 RX ORDER — HEPARIN SODIUM 1000 [USP'U]/ML
1500 INJECTION, SOLUTION INTRAVENOUS; SUBCUTANEOUS EVERY 6 HOURS PRN
Status: DISCONTINUED | OUTPATIENT
Start: 2025-01-01 | End: 2025-01-01

## 2025-01-01 RX ORDER — METOPROLOL TARTRATE 1 MG/ML
5 INJECTION, SOLUTION INTRAVENOUS ONCE
Status: COMPLETED | OUTPATIENT
Start: 2025-01-01 | End: 2025-01-01

## 2025-01-01 RX ORDER — IPRATROPIUM BROMIDE AND ALBUTEROL SULFATE .5; 3 MG/3ML; MG/3ML
1 SOLUTION RESPIRATORY (INHALATION) ONCE
Status: COMPLETED | OUTPATIENT
Start: 2025-01-01 | End: 2025-01-01

## 2025-01-01 RX ORDER — METOPROLOL TARTRATE 1 MG/ML
INJECTION, SOLUTION INTRAVENOUS
Status: COMPLETED
Start: 2025-01-01 | End: 2025-01-01

## 2025-01-01 RX ORDER — ALBUMIN HUMAN 50 G/1000ML
25 SOLUTION INTRAVENOUS ONCE
Status: COMPLETED | OUTPATIENT
Start: 2025-01-01 | End: 2025-01-01

## 2025-01-01 RX ADMIN — HEPARIN SODIUM 12 UNITS/KG/HR: 10000 INJECTION, SOLUTION INTRAVENOUS at 03:50

## 2025-01-01 RX ADMIN — HEPARIN SODIUM 3300 UNITS: 1000 INJECTION INTRAVENOUS; SUBCUTANEOUS at 09:26

## 2025-01-01 RX ADMIN — IPRATROPIUM BROMIDE AND ALBUTEROL SULFATE 3 ML: .5; 3 SOLUTION RESPIRATORY (INHALATION) at 17:37

## 2025-01-01 RX ADMIN — CHLORHEXIDINE GLUCONATE 15 ML: 1.2 SOLUTION ORAL at 08:07

## 2025-01-01 RX ADMIN — CEFTRIAXONE 2000 MG: 10 INJECTION, POWDER, FOR SOLUTION INTRAVENOUS at 02:51

## 2025-01-01 RX ADMIN — SODIUM CHLORIDE, SODIUM GLUCONATE, SODIUM ACETATE, POTASSIUM CHLORIDE, MAGNESIUM CHLORIDE, SODIUM PHOSPHATE, DIBASIC, AND POTASSIUM PHOSPHATE 100 ML/HR: .53; .5; .37; .037; .03; .012; .00082 INJECTION, SOLUTION INTRAVENOUS at 00:38

## 2025-01-01 RX ADMIN — AMIODARONE HYDROCHLORIDE 1 MG/MIN: 50 INJECTION, SOLUTION INTRAVENOUS at 09:32

## 2025-01-01 RX ADMIN — NOREPINEPHRINE BITARTRATE: 1 INJECTION INTRAVENOUS at 23:30

## 2025-01-01 RX ADMIN — EPINEPHRINE: 1 INJECTION, SOLUTION, CONCENTRATE INTRAVENOUS at 23:45

## 2025-01-01 RX ADMIN — FENTANYL CITRATE 100 MCG: 50 INJECTION INTRAMUSCULAR; INTRAVENOUS at 23:50

## 2025-01-01 RX ADMIN — DILTIAZEM HYDROCHLORIDE 5 MG/HR: 5 INJECTION INTRAVENOUS at 22:44

## 2025-01-01 RX ADMIN — FENTANYL CITRATE 100 MCG: 50 INJECTION INTRAMUSCULAR; INTRAVENOUS at 23:45

## 2025-01-01 RX ADMIN — NOREPINEPHRINE BITARTRATE 7 MCG/MIN: 1 INJECTION INTRAVENOUS at 09:06

## 2025-01-01 RX ADMIN — POTASSIUM CHLORIDE 40 MEQ: 20 SOLUTION ORAL at 06:17

## 2025-01-01 RX ADMIN — SODIUM CHLORIDE SOLN NEBU 3% 4 ML: 3 NEBU SOLN at 07:39

## 2025-01-01 RX ADMIN — APIXABAN 5 MG: 5 TABLET, FILM COATED ORAL at 14:34

## 2025-01-01 RX ADMIN — METOPROLOL TARTRATE 5 MG: 1 INJECTION, SOLUTION INTRAVENOUS at 00:13

## 2025-01-01 RX ADMIN — AMIODARONE HYDROCHLORIDE 200 MG: 200 TABLET ORAL at 16:59

## 2025-01-01 RX ADMIN — SODIUM PHOSPHATE, MONOBASIC, MONOHYDRATE AND SODIUM PHOSPHATE, DIBASIC, ANHYDROUS 12 MMOL: 142; 276 INJECTION, SOLUTION INTRAVENOUS at 02:55

## 2025-01-01 RX ADMIN — SODIUM CHLORIDE SOLN NEBU 3% 4 ML: 3 NEBU SOLN at 08:01

## 2025-01-01 RX ADMIN — PROPOFOL 50 MCG/KG/MIN: 10 INJECTION, EMULSION INTRAVENOUS at 04:49

## 2025-01-01 RX ADMIN — HEPARIN SODIUM 12 UNITS/KG/HR: 10000 INJECTION, SOLUTION INTRAVENOUS at 09:17

## 2025-01-01 RX ADMIN — AMIODARONE HYDROCHLORIDE 200 MG: 200 TABLET ORAL at 14:34

## 2025-01-01 RX ADMIN — EPINEPHRINE 2 MCG/MIN: 1 INJECTION INTRAMUSCULAR; INTRAVENOUS; SUBCUTANEOUS at 23:45

## 2025-01-01 RX ADMIN — NOREPINEPHRINE BITARTRATE 10 MCG/MIN: 1 INJECTION INTRAVENOUS at 01:30

## 2025-01-01 RX ADMIN — LEVOTHYROXINE SODIUM 50 MCG: 0.05 TABLET ORAL at 05:37

## 2025-01-01 RX ADMIN — GUAIFENESIN 600 MG: 600 TABLET, EXTENDED RELEASE ORAL at 20:33

## 2025-01-01 RX ADMIN — CHLORHEXIDINE GLUCONATE 15 ML: 1.2 SOLUTION ORAL at 08:22

## 2025-01-01 RX ADMIN — SODIUM CHLORIDE, SODIUM GLUCONATE, SODIUM ACETATE, POTASSIUM CHLORIDE, MAGNESIUM CHLORIDE, SODIUM PHOSPHATE, DIBASIC, AND POTASSIUM PHOSPHATE 100 ML/HR: .53; .5; .37; .037; .03; .012; .00082 INJECTION, SOLUTION INTRAVENOUS at 19:56

## 2025-01-01 RX ADMIN — METOPROLOL TARTRATE 25 MG: 25 TABLET, FILM COATED ORAL at 20:33

## 2025-01-01 RX ADMIN — DILTIAZEM HYDROCHLORIDE 15 MG: 5 INJECTION INTRAVENOUS at 19:41

## 2025-01-01 RX ADMIN — PRAVASTATIN SODIUM 40 MG: 40 TABLET ORAL at 16:59

## 2025-01-01 RX ADMIN — HEPARIN SODIUM 12 UNITS/KG/HR: 10000 INJECTION, SOLUTION INTRAVENOUS at 20:33

## 2025-01-01 RX ADMIN — IOHEXOL 100 ML: 350 INJECTION, SOLUTION INTRAVENOUS at 02:38

## 2025-01-01 RX ADMIN — HEPARIN SODIUM 3000 UNITS: 1000 INJECTION, SOLUTION INTRAVENOUS; SUBCUTANEOUS at 06:50

## 2025-01-01 RX ADMIN — Medication 50 MCG/HR: at 00:05

## 2025-01-01 RX ADMIN — PROPOFOL 20 MCG/KG/MIN: 10 INJECTION, EMULSION INTRAVENOUS at 02:11

## 2025-01-01 RX ADMIN — SODIUM CHLORIDE, SODIUM GLUCONATE, SODIUM ACETATE, POTASSIUM CHLORIDE, MAGNESIUM CHLORIDE, SODIUM PHOSPHATE, DIBASIC, AND POTASSIUM PHOSPHATE 1000 ML: .53; .5; .37; .037; .03; .012; .00082 INJECTION, SOLUTION INTRAVENOUS at 03:44

## 2025-01-01 RX ADMIN — ALBUMIN (HUMAN) 25 G: 12.5 INJECTION, SOLUTION INTRAVENOUS at 06:58

## 2025-01-01 RX ADMIN — METOPROLOL TARTRATE 25 MG: 25 TABLET, FILM COATED ORAL at 08:26

## 2025-01-01 RX ADMIN — SODIUM CHLORIDE, SODIUM GLUCONATE, SODIUM ACETATE, POTASSIUM CHLORIDE, MAGNESIUM CHLORIDE, SODIUM PHOSPHATE, DIBASIC, AND POTASSIUM PHOSPHATE 1000 ML: .53; .5; .37; .037; .03; .012; .00082 INJECTION, SOLUTION INTRAVENOUS at 02:57

## 2025-01-01 RX ADMIN — AMIODARONE HYDROCHLORIDE 200 MG: 200 TABLET ORAL at 11:13

## 2025-01-01 RX ADMIN — CHLORHEXIDINE GLUCONATE 15 ML: 1.2 SOLUTION ORAL at 02:21

## 2025-01-01 RX ADMIN — CHLORHEXIDINE GLUCONATE 15 ML: 1.2 SOLUTION ORAL at 20:58

## 2025-01-01 RX ADMIN — GUAIFENESIN 600 MG: 600 TABLET, EXTENDED RELEASE ORAL at 11:13

## 2025-01-01 RX ADMIN — APIXABAN 5 MG: 5 TABLET, FILM COATED ORAL at 11:13

## 2025-01-01 RX ADMIN — CEFTRIAXONE 2000 MG: 10 INJECTION, POWDER, FOR SOLUTION INTRAVENOUS at 00:13

## 2025-01-01 RX ADMIN — DEXTROSE 150 MG: 50 INJECTION, SOLUTION INTRAVENOUS at 09:17

## 2025-01-01 RX ADMIN — METOROPROLOL TARTRATE 5 MG: 5 INJECTION, SOLUTION INTRAVENOUS at 00:13

## 2025-01-01 RX ADMIN — IOHEXOL 85 ML: 350 INJECTION, SOLUTION INTRAVENOUS at 23:00

## 2025-01-01 RX ADMIN — SODIUM CHLORIDE SOLN NEBU 3% 4 ML: 3 NEBU SOLN at 13:16

## 2025-01-01 RX ADMIN — POTASSIUM CHLORIDE 20 MEQ: 14.9 INJECTION, SOLUTION INTRAVENOUS at 08:07

## 2025-01-01 RX ADMIN — SODIUM CHLORIDE, SODIUM GLUCONATE, SODIUM ACETATE, POTASSIUM CHLORIDE, MAGNESIUM CHLORIDE, SODIUM PHOSPHATE, DIBASIC, AND POTASSIUM PHOSPHATE 100 ML/HR: .53; .5; .37; .037; .03; .012; .00082 INJECTION, SOLUTION INTRAVENOUS at 20:33

## 2025-01-01 RX ADMIN — APIXABAN 5 MG: 5 TABLET, FILM COATED ORAL at 16:59

## 2025-01-01 RX ADMIN — SODIUM CHLORIDE SOLN NEBU 3% 4 ML: 3 NEBU SOLN at 14:02

## 2025-01-01 RX ADMIN — MAGNESIUM SULFATE HEPTAHYDRATE 2 G: 40 INJECTION, SOLUTION INTRAVENOUS at 22:43

## 2025-01-01 RX ADMIN — AMIODARONE HYDROCHLORIDE 1 MG/MIN: 50 INJECTION, SOLUTION INTRAVENOUS at 23:45

## 2025-01-01 RX ADMIN — SODIUM CHLORIDE SOLN NEBU 3% 4 ML: 3 NEBU SOLN at 20:02

## 2025-01-01 RX ADMIN — AMIODARONE HYDROCHLORIDE 0.5 MG/MIN: 50 INJECTION, SOLUTION INTRAVENOUS at 05:03

## 2025-01-01 RX ADMIN — PRAVASTATIN SODIUM 40 MG: 40 TABLET ORAL at 17:04

## 2025-01-01 RX ADMIN — Medication 20 MG: at 08:07

## 2025-01-01 RX ADMIN — SODIUM CHLORIDE, SODIUM LACTATE, POTASSIUM CHLORIDE, AND CALCIUM CHLORIDE 500 ML: .6; .31; .03; .02 INJECTION, SOLUTION INTRAVENOUS at 08:12

## 2025-01-01 RX ADMIN — CEFTRIAXONE 2000 MG: 10 INJECTION, POWDER, FOR SOLUTION INTRAVENOUS at 00:38

## 2025-01-01 RX ADMIN — Medication 5 MCG/MIN: at 09:18

## 2025-01-01 RX ADMIN — VASOPRESSIN 0.04 UNITS/MIN: 20 INJECTION INTRAVENOUS at 23:46

## 2025-02-11 ENCOUNTER — TELEPHONE (OUTPATIENT)
Age: 80
End: 2025-02-11

## 2025-02-11 NOTE — TELEPHONE ENCOUNTER
Pt called stating she called yesterday about [facility].  No encounters seen.  Pt stated it might be in her  chart.    Confirmed nothing was here.

## 2025-02-12 DIAGNOSIS — I10 ESSENTIAL HYPERTENSION: ICD-10-CM

## 2025-02-12 DIAGNOSIS — E03.9 ACQUIRED HYPOTHYROIDISM: ICD-10-CM

## 2025-02-12 RX ORDER — HYDROCHLOROTHIAZIDE 12.5 MG/1
12.5 CAPSULE ORAL DAILY
Qty: 90 CAPSULE | Refills: 1 | Status: SHIPPED | OUTPATIENT
Start: 2025-02-12

## 2025-02-12 RX ORDER — LEVOTHYROXINE SODIUM 50 UG/1
50 TABLET ORAL DAILY
Qty: 90 TABLET | Refills: 1 | Status: SHIPPED | OUTPATIENT
Start: 2025-02-12

## 2025-02-14 NOTE — TELEPHONE ENCOUNTER
Received form [employer] for patient to call back to set up an appointment to complete the form. Form scanned into Media and placed up front under her name.

## 2025-02-14 NOTE — TELEPHONE ENCOUNTER
Patient calls again regarding the forms from [facility]. Patient stated that the forms might be attached to her spouse's chart. Paperwork is not attached to this chart.

## 2025-03-18 PROBLEM — A41.9 SEPSIS (HCC): Status: ACTIVE | Noted: 2025-01-01

## 2025-03-18 PROBLEM — I48.91 ATRIAL FIBRILLATION WITH RVR (HCC): Status: ACTIVE | Noted: 2025-01-01

## 2025-03-18 PROBLEM — R06.00 DYSPNEA: Status: ACTIVE | Noted: 2025-01-01

## 2025-03-18 NOTE — ED PROVIDER NOTES
Time reflects when diagnosis was documented in both MDM as applicable and the Disposition within this note       Time User Action Codes Description Comment    3/18/2025 10:42 AM Chinmay Griffiths Add [R06.00] Dyspnea     3/18/2025 10:42 AM Chinmay Griffiths Add [R09.81] Nasal congestion     3/18/2025 10:42 AM Chinmay Griffiths Add [I10] Hypertension           ED Disposition       ED Disposition   Discharge    Condition   Stable    Date/Time   Tue Mar 18, 2025 10:42 AM    Comment   Romayne Geandrea discharge to home/self care.                   Assessment & Plan       Medical Decision Making  Shortness of breath, congestion-will do COVID/flu swabs, chest x-ray doubt pneumonia, pneumothorax, congestive heart failure, EKG rule out dysrhythmia, labs for electrolyte, anemia, reassess for disposition.     hypertension-will do cardiac return with endorgan damage    Amount and/or Complexity of Data Reviewed  Labs: ordered. Decision-making details documented in ED Course.  Radiology: ordered and independent interpretation performed.    Risk  OTC drugs.  Prescription drug management.        ED Course as of 03/18/25 1106   Tue Mar 18, 2025   1029 D-Dimer, Quant(!): 0.56  Age adjusted d-dimer is appropriate to r/o pulmonary embolism.    1041 Mecial work up reviewed and benign. Pt Is appropriate for dc to home and will follow up as an outpatient.         Medications   ipratropium-albuterol (FOR EMS ONLY) (DUO-NEB) 0.5-2.5 mg/3 mL inhalation solution 3 mL (0 mL Does not apply Given to EMS 3/18/25 0913)       ED Risk Strat Scores                                                History of Present Illness       Chief Complaint   Patient presents with    Shortness of Breath     Pt arrives via ems from home with c/o Sob starting last night. denies chest pain and cough. Pt given duo neb pta        Past Medical History:   Diagnosis Date    Abnormal findings on diagnostic imaging of breast     last assessed 3/19/14    Anemia due to chemotherapy  for breast cancer treated with erythropoietin (HCC)     last assessed 10/2/15    Disease of thyroid gland     Fibroadenoma of right breast     History of chemotherapy     Hx of radiation therapy     last assessed 17    Hypertension     Hypokalemia     last assessed 4/15/16    Long term use of drug     herceptin,last assessed 17    Overweight 2020    Use of anastrozole (Arimidex)       Past Surgical History:   Procedure Laterality Date    BREAST BIOPSY Left 2014    IDC    BREAST BIOPSY Right 2015    FIBROADENOMA     SECTION      X2    IVC FILTER RETRIEVAL  2014    central iv line with subcutaneous reservoir mediaport    LYMPHADENECTOMY Left 2014    axxillary    MASTECTOMY Left 2014    PORTACATH PLACEMENT  2014    SENTINEL LYMPH NODE BIOPSY Left 2014    AND    TONSILLECTOMY      TUBAL LIGATION        Family History   Problem Relation Age of Onset    Heart failure Mother     Coronary artery disease Mother     Diabetes Mother     No Known Problems Father     Breast cancer Sister 68    BRCA1 Negative Sister     No Known Problems Daughter     No Known Problems Maternal Grandmother     No Known Problems Maternal Grandfather     No Known Problems Paternal Grandmother     No Known Problems Paternal Grandfather     No Known Problems Maternal Aunt       Social History     Tobacco Use    Smoking status: Never     Passive exposure: Never    Smokeless tobacco: Never   Vaping Use    Vaping status: Never Used   Substance Use Topics    Alcohol use: Yes     Alcohol/week: 2.0 - 3.0 standard drinks of alcohol     Types: 2 - 3 Glasses of wine per week     Comment: social    Drug use: No      E-Cigarette/Vaping    E-Cigarette Use Never User       E-Cigarette/Vaping Substances    Nicotine No     THC No     CBD No     Flavoring No     Other No     Unknown No       I have reviewed and agree with the history as documented.       History provided by:  Patient  Shortness  of Breath  Severity:  Moderate  Duration:  2 days  Timing:  Constant  Progression:  Worsening  Chronicity:  New  Context: URI    Relieved by:  Nothing  Worsened by:  Nothing  Associated symptoms: cough    Associated symptoms: no abdominal pain, no chest pain, no ear pain, no fever, no rash, no sore throat, no vomiting and no wheezing        Review of Systems   Constitutional:  Negative for activity change, appetite change, fatigue and fever.   HENT:  Positive for congestion. Negative for dental problem, ear pain, rhinorrhea and sore throat.    Eyes:  Negative for pain and redness.   Respiratory:  Positive for cough and shortness of breath. Negative for chest tightness and wheezing.    Cardiovascular:  Negative for chest pain and palpitations.   Gastrointestinal:  Negative for abdominal pain, blood in stool, constipation, diarrhea, nausea and vomiting.   Endocrine: Negative for cold intolerance and heat intolerance.   Genitourinary:  Negative for dysuria, frequency and hematuria.   Musculoskeletal:  Negative for arthralgias and myalgias.   Skin:  Negative for color change, pallor and rash.   Neurological:  Negative for weakness and numbness.   Hematological:  Does not bruise/bleed easily.   Psychiatric/Behavioral:  Negative for agitation, hallucinations and suicidal ideas.            Objective       ED Triage Vitals   Temperature Pulse Blood Pressure Respirations SpO2 Patient Position - Orthostatic VS   03/18/25 0930 03/18/25 0917 03/18/25 0917 03/18/25 0917 03/18/25 0917 --   98 °F (36.7 °C) 85 (!) 196/82 22 94 %       Temp src Heart Rate Source BP Location FiO2 (%) Pain Score    -- -- -- -- --              Vitals      Date and Time Temp Pulse SpO2 Resp BP Pain Score FACES Pain Rating User   03/18/25 0930 98 °F (36.7 °C) -- -- -- -- -- -- BA   03/18/25 0917 -- 85 94 % 22 196/82 -- -- BA            Physical Exam  Constitutional:       Appearance: She is well-developed.   HENT:      Head: Normocephalic and atraumatic.       Nose: Congestion and rhinorrhea present.      Mouth/Throat:      Pharynx: Posterior oropharyngeal erythema present. No oropharyngeal exudate.   Eyes:      Pupils: Pupils are equal, round, and reactive to light.   Neck:      Vascular: No JVD.      Trachea: No tracheal deviation.   Cardiovascular:      Rate and Rhythm: Normal rate and regular rhythm.   Pulmonary:      Effort: No tachypnea, accessory muscle usage or respiratory distress.   Abdominal:      General: There is no distension.   Musculoskeletal:      Right lower leg: Normal.      Left lower leg: Normal.   Skin:     General: Skin is warm.      Capillary Refill: Capillary refill takes less than 2 seconds.   Neurological:      Mental Status: She is alert and oriented to person, place, and time.   Psychiatric:         Behavior: Behavior normal.         Results Reviewed       Procedure Component Value Units Date/Time    D-Dimer [[id number]]  (Abnormal) Collected: 03/18/25 0945    Lab Status: Final result Specimen: Blood from Arm, Right Updated: 03/18/25 1025     D-Dimer, Quant 0.56 ug/ml FEU     Narrative:      In the evaluation for possible pulmonary embolism, in the appropriate (Well's Score of 4 or less) patient, the age adjusted d-dimer cutoff for this patient can be calculated as:    Age x 0.01 (in ug/mL) for Age-adjusted D-dimer exclusion threshold for a patient over 50 years.    HS Troponin 0hr (reflex protocol) [[id number]]  (Normal) Collected: 03/18/25 0945    Lab Status: Final result Specimen: Blood from Arm, Right Updated: 03/18/25 1020     hs TnI 0hr 11 ng/L     HS Troponin I 2hr [[id number]]     Lab Status: No result Specimen: Blood     B-Type Natriuretic Peptide(BNP) [[id number]]  (Abnormal) Collected: 03/18/25 0945    Lab Status: Final result Specimen: Blood from Arm, Right Updated: 03/18/25 1018      pg/mL     Comprehensive metabolic panel [[id number]]  (Abnormal) Collected: 03/18/25 0945    Lab Status: Final result Specimen: Blood from  Arm, Right Updated: 03/18/25 1012     Sodium 141 mmol/L      Potassium 4.2 mmol/L      Chloride 98 mmol/L      CO2 37 mmol/L      ANION GAP 6 mmol/L      BUN 18 mg/dL      Creatinine 0.32 mg/dL      Glucose 106 mg/dL      Calcium 9.8 mg/dL      AST 24 U/L      ALT 15 U/L      Alkaline Phosphatase 65 U/L      Total Protein 7.3 g/dL      Albumin 4.1 g/dL      Total Bilirubin 0.67 mg/dL      eGFR 107 ml/min/1.73sq m     Narrative:      [employer] guidelines for Chronic Kidney Disease (CKD):     Stage 1 with normal or high GFR (GFR > 90 mL/min/1.73 square meters)    Stage 2 Mild CKD (GFR = 60-89 mL/min/1.73 square meters)    Stage 3A Moderate CKD (GFR = 45-59 mL/min/1.73 square meters)    Stage 3B Moderate CKD (GFR = 30-44 mL/min/1.73 square meters)    Stage 4 Severe CKD (GFR = 15-29 mL/min/1.73 square meters)    Stage 5 End Stage CKD (GFR <15 mL/min/1.73 square meters)  Note: GFR calculation is accurate only with a steady state creatinine    CBC and differential [[id number]]  (Abnormal) Collected: 03/18/25 0945    Lab Status: Final result Specimen: Blood from Arm, Right Updated: 03/18/25 0956     WBC 12.12 Thousand/uL      RBC 5.23 Million/uL      Hemoglobin 15.7 g/dL      Hematocrit 49.0 %      MCV 94 fL      MCH 30.0 pg      MCHC 32.0 g/dL      RDW 14.7 %      MPV 10.4 fL      Platelets 280 Thousands/uL      nRBC 0 /100 WBCs      Segmented % 81 %      Immature Grans % 0 %      Lymphocytes % 10 %      Monocytes % 8 %      Eosinophils Relative 1 %      Basophils Relative 0 %      Absolute Neutrophils 9.76 Thousands/µL      Absolute Immature Grans 0.04 Thousand/uL      Absolute Lymphocytes 1.26 Thousands/µL      Absolute Monocytes 0.96 Thousand/µL      Eosinophils Absolute 0.06 Thousand/µL      Basophils Absolute 0.04 Thousands/µL     FLU/RSV/COVID - if FLU/RSV clinically relevant (2hr TAT) [[id number]] Collected: 03/18/25 0945    Lab Status: In process Specimen: Nares from Nose Updated: 03/18/25  0953            XR chest 2 views   ED Interpretation by Chinmay Griffiths MD (03/18 1030)   Primary reviewed: No acute abnormality      Final Interpretation by Lili Martinez MD (03/18 1041)      No acute cardiopulmonary disease.            Workstation performed: [id number]             ECG 12 Lead Documentation Only    Date/Time: 3/18/2025 10:04 AM    Performed by: Chinmay Griffiths MD  Authorized by: Chinmay Griffiths MD    ECG reviewed by me, the ED Provider: yes    Patient location:  ED  Quality:     Tracing quality:  Limited by artifact  Rate:     ECG rate:  74    ECG rate assessment: normal    Rhythm:     Rhythm: sinus rhythm    Ectopy:     Ectopy: none    QRS:     QRS axis:  Normal    QRS intervals:  Normal  Conduction:     Conduction: normal    ST segments:     ST segments:  Normal  T waves:     T waves: non-specific        ED Medication and Procedure Management   Prior to Admission Medications   Prescriptions Last Dose Informant Patient Reported? Taking?   Calcium Carbonate-Vitamin D 600-400 MG-UNIT per tablet  Self Yes No   Sig: Take 2 tablets by mouth daily   Multiple Vitamin (MULTIVITAMINS PO)  Self Yes No   Sig: Take by mouth   anastrozole (ARIMIDEX) 1 mg tablet  Self No No   Sig: TAKE 1 TABLET BY MOUTH EVERY DAY   Patient not taking: Reported on 10/8/2024   aspirin (ECOTRIN LOW STRENGTH) 81 mg EC tablet  Self Yes No   Sig: Take 81 mg by mouth daily   Patient not taking: Reported on 10/8/2024   hydroCHLOROthiazide 12.5 mg capsule   No No   Sig: TAKE 1 CAPSULE BY MOUTH EVERY DAY   levothyroxine 50 mcg tablet   No No   Sig: TAKE 1 TABLET BY MOUTH EVERY DAY   pravastatin (PRAVACHOL) 40 mg tablet   No No   Sig: TAKE 1 TABLET BY MOUTH EVERY DAY      Facility-Administered Medications: None     Patient's Medications   Discharge Prescriptions    AZELASTINE (ASTELIN) 0.1 % NASAL SPRAY    1 spray into each nostril 2 (two) times a day Use in each nostril as directed       Start Date: 3/18/2025 End Date: --        Order Dose: 1 spray       Quantity: 1 mL    Refills: 0    GUAIFENESIN (MUCINEX) 600 MG 12 HR TABLET    Take 1 tablet (600 mg total) by mouth 2 (two) times a day for 5 days       Start Date: 3/18/2025 End Date: 3/23/2025       Order Dose: 600 mg       Quantity: 10 tablet    Refills: 0     No discharge procedures on file.  ED SEPSIS DOCUMENTATION   Time reflects when diagnosis was documented in both MDM as applicable and the Disposition within this note       Time User Action Codes Description Comment    3/18/2025 10:42 AM Chinmay Griffiths [R06.00] Dyspnea     3/18/2025 10:42 AM Chinmay Griffiths [R09.81] Nasal congestion     3/18/2025 10:42 AM Chinmay Griffiths [I10] Hypertension                  Chinmay Griffiths MD  03/18/25 1104

## 2025-03-18 NOTE — ED NOTES
Patients HR shot up to 214 BPM. RN ran in to assess the patient. Patient reported no symptoms. ED provider called to bedside to assess patient. Patient vasovagled and dropped down to 80s. EKG ran and signed by ED provider. Admitting attending made aware and EKG sent     Regine Bowens RN  03/18/25 1950

## 2025-03-18 NOTE — ASSESSMENT & PLAN NOTE
Likely precipitated by respiratory infection  [id number] score 4 due to age, sex and hypertension  Start heparin drip  she improved with IV Cardizem 10 mg IV x 1.  If her heart rate worsens again, start Cardizem drip.  Check echocardiogram  Consult cardiology.

## 2025-03-18 NOTE — ASSESSMENT & PLAN NOTE
Due to acute bronchitis versus early pneumonia not seen on chest x-ray earlier today  She has WBC of 12,  respiratory rate more than 20 and also she has had episodes of tachycardia greater than 180 confirmed by EKG.    Will order CT of the chest with contrast rule out PE, pneumonia  After blood cultures are obtained, start empiric ceftriaxone  Check procalcitonin and lactic acid  She is  not hypotensive so does not  IV fluid resuscitation.  See plan for atrial fibrillation below.

## 2025-03-19 NOTE — PLAN OF CARE
Problem: Potential for Falls  Goal: Patient will remain free of falls  Description: INTERVENTIONS:  - Educate patient/family on patient safety including physical limitations  - Instruct patient to call for assistance with activity   - Consult OT/PT to assist with strengthening/mobility   - Keep Call bell within reach  - Keep bed low and locked with side rails adjusted as appropriate  - Keep care items and personal belongings within reach  - Initiate and maintain comfort rounds  - Make Fall Risk Sign visible to staff  - Offer Toileting every 3 Hours, in advance of need  - Initiate/Maintain bed alarm  - Obtain necessary fall risk management equipment: alarm   - Apply yellow socks and bracelet for high fall risk patients  - Consider moving patient to room near nurses station  Outcome: Progressing     Problem: PAIN - ADULT  Goal: Verbalizes/displays adequate comfort level or baseline comfort level  Description: Interventions:  - Encourage patient to monitor pain and request assistance  - Assess pain using appropriate pain scale  - Administer analgesics based on type and severity of pain and evaluate response  - Implement non-pharmacological measures as appropriate and evaluate response  - Consider cultural and social influences on pain and pain management  - Notify physician/advanced practitioner if interventions unsuccessful or patient reports new pain  Outcome: Progressing     Problem: INFECTION - ADULT  Goal: Absence or prevention of progression during hospitalization  Description: INTERVENTIONS:  - Assess and monitor for signs and symptoms of infection  - Monitor lab/diagnostic results  - Monitor all insertion sites, i.e. indwelling lines, tubes, and drains  - Monitor endotracheal if appropriate and nasal secretions for changes in amount and color  - [place] appropriate cooling/warming therapies per order  - Administer medications as ordered  - Instruct and encourage patient and family to use good hand hygiene  technique  - Identify and instruct in appropriate isolation precautions for identified infection/condition  Outcome: Progressing  Goal: Absence of fever/infection during neutropenic period  Description: INTERVENTIONS:  - Monitor WBC    Outcome: Progressing     Problem: SAFETY ADULT  Goal: Patient will remain free of falls  Description: INTERVENTIONS:  - Educate patient/family on patient safety including physical limitations  - Instruct patient to call for assistance with activity   - Consult OT/PT to assist with strengthening/mobility   - Keep Call bell within reach  - Keep bed low and locked with side rails adjusted as appropriate  - Keep care items and personal belongings within reach  - Initiate and maintain comfort rounds  - Make Fall Risk Sign visible to staff  - Offer Toileting every 3 Hours, in advance of need  - Initiate/Maintain bed alarm  - Obtain necessary fall risk management equipment: alarm   - Apply yellow socks and bracelet for high fall risk patients  - Consider moving patient to room near nurses station  Outcome: Progressing  Goal: Maintain or return to baseline ADL function  Description: INTERVENTIONS:  -  Assess patient's ability to carry out ADLs; assess patient's baseline for ADL function and identify physical deficits which impact ability to perform ADLs (bathing, care of mouth/teeth, toileting, grooming, dressing, etc.)  - Assess/evaluate cause of self-care deficits   - Assess range of motion  - Assess patient's mobility; develop plan if impaired  - Assess patient's need for assistive devices and provide as appropriate  - Encourage maximum independence but intervene and supervise when necessary  - Involve family in performance of ADLs  - Assess for home care needs following discharge   - Consider OT consult to assist with ADL evaluation and planning for discharge  - Provide patient education as appropriate  Outcome: Progressing  Goal: Maintains/Returns to pre admission functional  level  Description: INTERVENTIONS:  - Perform AM-PAC 6 Click Basic Mobility/ Daily Activity assessment daily.  - Set and communicate daily mobility goal to care team and patient/family/caregiver.   - Collaborate with rehabilitation services on mobility goals if consulted  - Perform Range of Motion 3 times a day.  - Reposition patient every 3 hours.  - Dangle patient 3 times a day  - Stand patient 3 times a day  - Ambulate patient 3 times a day  - Out of bed to chair 3 times a day   - Out of bed for meals 3 times a day  - Out of bed for toileting  - Record patient progress and toleration of activity level   Outcome: Progressing     Problem: DISCHARGE PLANNING  Goal: Discharge to home or other facility with appropriate resources  Description: INTERVENTIONS:  - Identify barriers to discharge w/patient and caregiver  - Arrange for needed discharge resources and transportation as appropriate  - Identify discharge learning needs (meds, wound care, etc.)  - Arrange for interpretive services to assist at discharge as needed  - Refer to Case Management Department for coordinating discharge planning if the patient needs post-hospital services based on physician/advanced practitioner order or complex needs related to functional status, cognitive ability, or social support system  Outcome: Progressing     Problem: Knowledge Deficit  Goal: Patient/family/caregiver demonstrates understanding of disease process, treatment plan, medications, and discharge instructions  Description: Complete learning assessment and assess knowledge base.  Interventions:  - Provide teaching at level of understanding  - Provide teaching via preferred learning methods  Outcome: Progressing     Problem: NEUROSENSORY - ADULT  Goal: Achieves stable or improved neurological status  Description: INTERVENTIONS  - Monitor and report changes in neurological status  - Monitor vital signs such as temperature, blood pressure, glucose, and any other labs ordered    - Initiate measures to prevent increased intracranial pressure  - Monitor for seizure activity and implement precautions if appropriate      Outcome: Progressing  Goal: Remains free of injury related to seizures activity  Description: INTERVENTIONS  - Maintain airway, patient safety  and administer oxygen as ordered  - Monitor patient for seizure activity, document and report duration and description of seizure to physician/advanced practitioner  - If seizure occurs,  ensure patient safety during seizure  - Reorient patient post seizure  - Seizure pads on all 4 side rails  - Instruct patient/family to notify RN of any seizure activity including if an aura is experienced  - Instruct patient/family to call for assistance with activity based on nursing assessment  - Administer anti-seizure medications if ordered    Outcome: Progressing  Goal: Achieves maximal functionality and self care  Description: INTERVENTIONS  - Monitor swallowing and airway patency with patient fatigue and changes in neurological status  - Encourage and assist patient to increase activity and self care.   - Encourage visually impaired, hearing impaired and aphasic patients to use assistive/communication devices  Outcome: Progressing     Problem: CARDIOVASCULAR - ADULT  Goal: Maintains optimal cardiac output and hemodynamic stability  Description: INTERVENTIONS:  - Monitor I/O, vital signs and rhythm  - Monitor for S/S and trends of decreased cardiac output  - Administer and titrate ordered vasoactive medications to optimize hemodynamic stability  - Assess quality of pulses, skin color and temperature  - Assess for signs of decreased coronary artery perfusion  - Instruct patient to report change in severity of symptoms  Outcome: Progressing  Goal: Absence of cardiac dysrhythmias or at baseline rhythm  Description: INTERVENTIONS:  - Continuous cardiac monitoring, vital signs, obtain 12 lead EKG if ordered  - Administer antiarrhythmic and heart  rate control medications as ordered  - Monitor electrolytes and administer replacement therapy as ordered  Outcome: Progressing     Problem: RESPIRATORY - ADULT  Goal: Achieves optimal ventilation and oxygenation  Description: INTERVENTIONS:  - Assess for changes in respiratory status  - Assess for changes in mentation and behavior  - Position to facilitate oxygenation and minimize respiratory effort  - Oxygen administered by appropriate delivery if ordered  - Initiate smoking cessation education as indicated  - Encourage broncho-pulmonary hygiene including cough, deep breathe, Incentive Spirometry  - Assess the need for suctioning and aspirate as needed  - Assess and instruct to report SOB or any respiratory difficulty  - Respiratory Therapy support as indicated  Outcome: Progressing     Problem: GASTROINTESTINAL - ADULT  Goal: Minimal or absence of nausea and/or vomiting  Description: INTERVENTIONS:  - Administer IV fluids if ordered to ensure adequate hydration  - Maintain NPO status until nausea and vomiting are resolved  - Nasogastric tube if ordered  - Administer ordered antiemetic medications as needed  - Provide nonpharmacologic comfort measures as appropriate  - Advance diet as tolerated, if ordered  - Consider nutrition services referral to assist patient with adequate nutrition and appropriate food choices  Outcome: Progressing  Goal: Maintains or returns to baseline bowel function  Description: INTERVENTIONS:  - Assess bowel function  - Encourage oral fluids to ensure adequate hydration  - Administer IV fluids if ordered to ensure adequate hydration  - Administer ordered medications as needed  - Encourage mobilization and activity  - Consider nutritional services referral to assist patient with adequate nutrition and appropriate food choices  Outcome: Progressing  Goal: Maintains adequate nutritional intake  Description: INTERVENTIONS:  - Monitor percentage of each meal consumed  - Identify factors  contributing to decreased intake, treat as appropriate  - Assist with meals as needed  - Monitor I&O, weight, and lab values if indicated  - Obtain nutrition services referral as needed  Outcome: Progressing  Goal: Establish and maintain optimal ostomy function  Description: INTERVENTIONS:  - Assess bowel function  - Encourage oral fluids to ensure adequate hydration  - Administer IV fluids if ordered to ensure adequate hydration   - Administer ordered medications as needed  - Encourage mobilization and activity  - Nutrition services referral to assist patient with appropriate food choices  - Assess stoma site  - Consider wound care consult   Outcome: Progressing  Goal: Oral mucous membranes remain intact  Description: INTERVENTIONS  - Assess oral mucosa and hygiene practices  - Implement preventative oral hygiene regimen  - Implement oral medicated treatments as ordered  - Initiate Nutrition services referral as needed  Outcome: Progressing     Problem: GENITOURINARY - ADULT  Goal: Maintains or returns to baseline urinary function  Description: INTERVENTIONS:  - Assess urinary function  - Encourage oral fluids to ensure adequate hydration if ordered  - Administer IV fluids as ordered to ensure adequate hydration  - Administer ordered medications as needed  - Offer frequent toileting  - Follow urinary retention protocol if ordered  Outcome: Progressing  Goal: Absence of urinary retention  Description: INTERVENTIONS:  - Assess patient’s ability to void and empty bladder  - Monitor I/O  - Bladder scan as needed  - Discuss with physician/AP medications to alleviate retention as needed  - Discuss catheterization for long term situations as appropriate  Outcome: Progressing  Goal: Urinary catheter remains patent  Description: INTERVENTIONS:  - Assess patency of urinary catheter  - If patient has a chronic quiñones, consider changing catheter if non-functioning  - Follow guidelines for intermittent irrigation of  non-functioning urinary catheter  Outcome: Progressing     Problem: METABOLIC, FLUID AND ELECTROLYTES - ADULT  Goal: Electrolytes maintained within normal limits  Description: INTERVENTIONS:  - Monitor labs and assess patient for signs and symptoms of electrolyte imbalances  - Administer electrolyte replacement as ordered  - Monitor response to electrolyte replacements, including repeat lab results as appropriate  - Instruct patient on fluid and nutrition as appropriate  Outcome: Progressing  Goal: Fluid balance maintained  Description: INTERVENTIONS:  - Monitor labs   - Monitor I/O and WT  - Instruct patient on fluid and nutrition as appropriate  - Assess for signs & symptoms of volume excess or deficit  Outcome: Progressing  Goal: Glucose maintained within target range  Description: INTERVENTIONS:  - Monitor Blood Glucose as ordered  - Assess for signs and symptoms of hyperglycemia and hypoglycemia  - Administer ordered medications to maintain glucose within target range  - Assess nutritional intake and initiate nutrition service referral as needed  Outcome: Progressing     Problem: SKIN/TISSUE INTEGRITY - ADULT  Goal: Skin Integrity remains intact(Skin Breakdown Prevention)  Description: Assess:  -Perform Miki assessment every 3  -Clean and moisturize skin every   -Inspect skin when repositioning, toileting, and assisting with ADLS  -Assess under medical devices such as  every   -Assess extremities for adequate circulation and sensation     Bed Management:  -Have minimal linens on bed & keep smooth, unwrinkled  -Change linens as needed when moist or perspiring  -Avoid sitting or lying in one position for more than  hours while in bed  -Keep HOB at degrees     Toileting:  -Offer bedside commode  -Assess for incontinence every   -Use incontinent care products after each incontinent episode such as     Activity:  -Mobilize patient  times a day  -Encourage activity and walks on unit  -Encourage or provide ROM  exercises   -Turn and reposition patient every  Hours  -Use appropriate equipment to lift or move patient in bed  -Instruct/ Assist with weight shifting every  when out of bed in chair  -Consider limitation of chair time  hour intervals    Skin Care:  -Avoid use of baby powder, tape, friction and shearing, hot water or constrictive clothing  -Relieve pressure over bony prominences using   -Do not massage red bony areas    Next Steps:  -Teach patient strategies to minimize risks such as    -Consider consults to  interdisciplinary teams such as   Outcome: Progressing  Goal: Incision(s), wounds(s) or drain site(s) healing without S/S of infection  Description: INTERVENTIONS  - Assess and document dressing, incision, wound bed, drain sites and surrounding tissue  - Provide patient and family education  - Perform skin care/dressing changes every   Outcome: Progressing  Goal: Pressure injury heals and does not worsen  Description: Interventions:  - Implement low air loss mattress or specialty surface (Criteria met)  - Apply silicone foam dressing  - Instruct/assist with weight shifting every  minutes when in chair   - Limit chair time to  hour intervals  - Use special pressure reducing interventions such as  when in chair   - Apply fecal or urinary incontinence containment device   - Perform passive or active ROM every   - Turn and reposition patient & offload bony prominences every  hours   - Utilize friction reducing device or surface for transfers   - Consider consults to  interdisciplinary teams such as   - Use incontinent care products after each incontinent episode such as  - Consider nutrition services referral as needed  Outcome: Progressing     Problem: HEMATOLOGIC - ADULT  Goal: Maintains hematologic stability  Description: INTERVENTIONS  - Assess for signs and symptoms of bleeding or hemorrhage  - Monitor labs  - Administer supportive blood products/factors as ordered and appropriate  Outcome: Progressing      Problem: MUSCULOSKELETAL - ADULT  Goal: Maintain or return mobility to safest level of function  Description: INTERVENTIONS:  - Assess patient's ability to carry out ADLs; assess patient's baseline for ADL function and identify physical deficits which impact ability to perform ADLs (bathing, care of mouth/teeth, toileting, grooming, dressing, etc.)  - Assess/evaluate cause of self-care deficits   - Assess range of motion  - Assess patient's mobility  - Assess patient's need for assistive devices and provide as appropriate  - Encourage maximum independence but intervene and supervise when necessary  - Involve family in performance of ADLs  - Assess for home care needs following discharge   - Consider OT consult to assist with ADL evaluation and planning for discharge  - Provide patient education as appropriate  Outcome: Progressing  Goal: Maintain proper alignment of affected body part  Description: INTERVENTIONS:  - Support, maintain and protect limb and body alignment  - Provide patient/ family with appropriate education  Outcome: Progressing

## 2025-03-19 NOTE — H&P
H&P - Hospitalist   Name: Romayne Geissenhainer 79 y.o. female I MRN: [id number]  Unit/Bed#: ED-31 I Date of Admission: 3/18/2025   Date of Service: 3/18/2025 I Hospital Day: 0     Assessment & Plan  Sepsis (HCC)  Due to acute bronchitis versus early pneumonia not seen on chest x-ray earlier today  She has WBC of 12,  respiratory rate more than 20 and also she has had episodes of tachycardia greater than 180 confirmed by EKG.    Will order CT of the chest with contrast rule out PE, pneumonia  After blood cultures are obtained, start empiric ceftriaxone  Check procalcitonin and lactic acid  She is  not hypotensive so does not  IV fluid resuscitation.  See plan for atrial fibrillation below.  Atrial fibrillation with RVR (HCC)  Likely precipitated by respiratory infection  [id number] score 4 due to age, sex and hypertension  Start heparin drip  she improved with IV Cardizem 10 mg IV x 1.  If her heart rate worsens again, start Cardizem drip.  Check echocardiogram  Consult cardiology.  Hypertension  Hold HCTZ in place of Cardizem  Hypothyroidism  Check TSH/free T4  Continue levothyroxine 50 mcg for now      VTE Pharmacologic Prophylaxis: VTE Score: 4 Moderate Risk (Score 3-4) - Pharmacological DVT Prophylaxis Ordered: heparin drip.  Code Status: Full code  Discussion with family: Updated  (son and niece) at bedside.    Anticipated Length of Stay: Patient will be admitted on an inpatient basis with an anticipated length of stay of greater than 2 midnights secondary to sepsis, new onset A-fib.    History of Present Illness   Chief Complaint: Shortness of breath    Romayne Geissenhainer is a 79 y.o. female with a PMH of breast cancer, hypertension, hypothyroidism who presents with shortness of breath.  She was in the ER earlier today and underwent a workup.  She had chest x-ray which showed no abnormality.  She had a D-dimer which adjusted for age ruled out PE.  She was sent home and came back this  afternoon due to persistent shortness of breath.  While in the emergency department she developed rapid atrial fibrillation.   This initially improved with vagal maneuvers.  However she also required IV Cardizem 10 mg x 1.  The patient starting having shortness of breath on minimal exertion over the weekend.  She denied cough or sputum production then but today she noticed that she feels there is a lot of phlegm that she could not expectorate.  Additional information was obtained from her son and niece.  They told me that she has lost approximately 60 pounds in 1 year.  She has been taking care of her disabled  and has not been taking care of herself in the process.  She has episodes of anxiety.  According to her son she has complained of occasional fluttering in her chest over the years.    Review of Systems   Constitutional:  Positive for fatigue and unexpected weight change.   HENT:  Positive for congestion.    Respiratory:  Positive for shortness of breath.    Cardiovascular:  Negative for palpitations.   Gastrointestinal: Negative.    Genitourinary: Negative.    Musculoskeletal: Negative.    Neurological: Negative.    All other systems reviewed and are negative.      Historical Information   Past Medical History:   Diagnosis Date    Abnormal findings on diagnostic imaging of breast     last assessed 3/19/14    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 (HCC)     last assessed 10/2/15    Disease of thyroid gland     Fibroadenoma of right breast     History of chemotherapy     Hx of radiation therapy     last assessed 17    Hypertension     Hypokalemia     last assessed 4/15/16    Long term use of drug     herceptin,last assessed 17    Overweight 2020    Use of anastrozole (Arimidex)      Past Surgical History:   Procedure Laterality Date    BREAST BIOPSY Left 2014    IDC    BREAST BIOPSY Right 2015    FIBROADENOMA     SECTION      X2     IVC FILTER RETRIEVAL  05/13/2014    central iv line with subcutaneous reservoir mediaport    LYMPHADENECTOMY Left 04/04/2014    axxillary    MASTECTOMY Left 04/14/2014    PORTACATH PLACEMENT  05/13/2014    SENTINEL LYMPH NODE BIOPSY Left 04/14/2014    AND    TONSILLECTOMY  1956    TUBAL LIGATION       Social History     Tobacco Use    Smoking status: Never     Passive exposure: Never    Smokeless tobacco: Never   Vaping Use    Vaping status: Never Used   Substance and Sexual Activity    Alcohol use: Yes     Alcohol/week: 2.0 - 3.0 standard drinks of alcohol     Types: 2 - 3 Glasses of wine per week     Comment: social    Drug use: No    Sexual activity: Not Currently     Birth control/protection: Post-menopausal     E-Cigarette/Vaping    E-Cigarette Use Never User      E-Cigarette/Vaping Substances    Nicotine No     THC No     CBD No     Flavoring No     Other No     Unknown No      Family History   Problem Relation Age of Onset    Heart failure Mother     Coronary artery disease Mother     Diabetes Mother     No Known Problems Father     Breast cancer Sister 68    BRCA1 Negative Sister     No Known Problems Daughter     No Known Problems Maternal Grandmother     No Known Problems Maternal Grandfather     No Known Problems Paternal Grandmother     No Known Problems Paternal Grandfather     No Known Problems Maternal Aunt      Social History:  Marital Status: /Civil Union   Occupation: none  Patient Pre-hospital Living Situation: Home  Patient Pre-hospital Level of Mobility: walks  Patient Pre-hospital Diet Restrictions: none    Meds/Allergies   I have reviewed home medications using recent Epic encounter.  Prior to Admission medications    Medication Sig Start Date End Date Taking? Authorizing Provider   anastrozole (ARIMIDEX) 1 mg tablet TAKE 1 TABLET BY MOUTH EVERY DAY  Patient not taking: Reported on 10/8/2024 6/13/23   Manuela Mcginnis MD   aspirin (ECOTRIN LOW STRENGTH) 81 mg EC tablet Take 81 mg by  mouth daily  Patient not taking: Reported on 10/8/2024    Historical Provider, MD   azelastine (ASTELIN) 0.1 % nasal spray 1 spray into each nostril 2 (two) times a day Use in each nostril as directed 3/18/25   Chinmay Griffiths MD   Calcium Carbonate-Vitamin D 600-400 MG-UNIT per tablet Take 2 tablets by mouth daily 10/2/15   Historical Provider, MD   guaiFENesin (MUCINEX) 600 mg 12 hr tablet Take 1 tablet (600 mg total) by mouth 2 (two) times a day for 5 days 3/18/25 3/23/25  Chinmay Griffiths MD   hydroCHLOROthiazide 12.5 mg capsule TAKE 1 CAPSULE BY MOUTH EVERY DAY 2/12/25   Dillon Luque DO   levothyroxine 50 mcg tablet TAKE 1 TABLET BY MOUTH EVERY DAY 2/12/25   Dillon Luque DO   Multiple Vitamin (MULTIVITAMINS PO) Take by mouth 11/15/17   Historical Provider, MD   pravastatin (PRAVACHOL) 40 mg tablet TAKE 1 TABLET BY MOUTH EVERY DAY 8/4/24   Dillon Luque DO     Allergies   Allergen Reactions    Mushroom Extract Complex - Food Allergy Hives       Objective :  Temp:  [98 °F (36.7 °C)-98.4 °F (36.9 °C)] 98.4 °F (36.9 °C)  HR:  [81-94] 94  BP: (101-196)/(57-82) 101/57  Resp:  [22] 22  SpO2:  [94 %-98 %] 98 %  O2 Device: Nasal cannula  Nasal Cannula O2 Flow Rate (L/min):  [3 L/min-4 L/min] 4 L/min    Physical Exam  Vitals reviewed.   Constitutional:       Appearance: She is ill-appearing.   HENT:      Head: Normocephalic and atraumatic.      Nose: No congestion or rhinorrhea.   Eyes:      General: No scleral icterus.  Cardiovascular:      Rate and Rhythm: Normal rate and regular rhythm.   Pulmonary:      Comments: Coarse breath sounds bilaterally  Abdominal:      Palpations: Abdomen is soft.      Tenderness: There is no abdominal tenderness. There is no guarding.   Musculoskeletal:         General: No swelling or tenderness.      Right lower leg: No edema.      Left lower leg: No edema.   Skin:     General: Skin is warm and dry.   Neurological:      Mental Status: She is oriented to person, place,  "and time.   Psychiatric:         Mood and Affect: Mood normal.         Behavior: Behavior normal.          Lines/Drains:            Lab Results: I have reviewed the following results:  Results from last 7 days   Lab Units 03/18/25  1805   WBC Thousand/uL 12.21*   HEMOGLOBIN g/dL 15.7*   HEMATOCRIT % 49.0*   PLATELETS Thousands/uL 274   SEGS PCT % 89*   LYMPHO PCT % 6*   MONO PCT % 5   EOS PCT % 0     Results from last 7 days   Lab Units 03/18/25  1805 03/18/25  0945   SODIUM mmol/L 138 141   POTASSIUM mmol/L 4.1 4.2   CHLORIDE mmol/L 97 98   CO2 mmol/L 31 37*   BUN mg/dL 21 18   CREATININE mg/dL 0.42* 0.32*   ANION GAP mmol/L 10 6   CALCIUM mg/dL 9.8 9.8   ALBUMIN g/dL  --  4.1   TOTAL BILIRUBIN mg/dL  --  0.67   ALK PHOS U/L  --  65   ALT U/L  --  15   AST U/L  --  24   GLUCOSE RANDOM mg/dL 114 106             No results found for: \"HGBA1C\"        Imaging Results Review: I reviewed radiology reports from this admission including: chest xray.      Administrative Statements       ** Please Note: This note has been constructed using a voice recognition system. **    "

## 2025-03-19 NOTE — OCCUPATIONAL THERAPY NOTE
Occupational Therapy Evaluation     Patient Name: Romayne Geissenhainer  Today's Date: 3/19/2025  Problem List  Principal Problem:    Atrial fibrillation with RVR (HCC)  Active Problems:    Hypertension    Hypothyroidism    Sepsis (HCC)    Past Medical History  Past Medical History:   Diagnosis Date    Abnormal findings on diagnostic imaging of breast     last assessed 3/19/14    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 (HCC)     last assessed 10/2/15    Disease of thyroid gland     Fibroadenoma of right breast     History of chemotherapy     Hx of radiation therapy     last assessed 17    Hypertension     Hypokalemia     last assessed 4/15/16    Long term use of drug     herceptin,last assessed 17    Overweight 2020    Use of anastrozole (Arimidex)      Past Surgical History  Past Surgical History:   Procedure Laterality Date    BREAST BIOPSY Left 2014    IDC    BREAST BIOPSY Right 2015    FIBROADENOMA     SECTION      X2    IVC FILTER RETRIEVAL  2014    central iv line with subcutaneous reservoir mediaport    LYMPHADENECTOMY Left 2014    axxillary    MASTECTOMY Left 2014    PORTACATH PLACEMENT  2014    SENTINEL LYMPH NODE BIOPSY Left 2014    AND    TONSILLECTOMY  195    TUBAL LIGATION             25 1033   Note Type   Note type Evaluation   Pain Assessment   Pain Assessment Tool 0-10   Pain Score No Pain   Restrictions/Precautions   Weight Bearing Precautions Per Order No   Other Precautions Chair Alarm;Bed Alarm;O2;Fall Risk;Limb alert  (SPO2=86% on RA, 93% on 4liters of O2)   Home Living   Type of Home House   Home Layout Two level;Able to live on main level with bedroom/bathroom;Laundry in basement;Work area in basement  (2(front) to 4(rear) michelle)   Bathroom Shower/Tub Tub/shower unit   Bathroom Toilet Standard   Bathroom Equipment Grab bars in shower;Shower chair   Home Equipment Walker;Cane   Prior  "Function   Level of [place] Independent with ADLs;Independent with functional mobility   Lives With Spouse   Falls in the last 6 months 1 to 4  (1)   Comments PTA pt states independence with her ADLs, transfers, ambulation--occasional use of RW; +falls=1, neg home alone( 89y/o); +   Lifestyle   Autonomy PTA pt states independence with her ADLs, transfers, ambulation--occasional use of RW; +falls=1, neg home alone( 89y/o); +   Reciprocal Relationships 2 children   Service to Others worked as a    Intrinsic Gratification spending time with family   Subjective   Subjective \"I did't get much sleep last night.\"   ADL   Where Assessed Edge of bed   Eating Assistance 6  Modified independent   Grooming Assistance 6  Modified Independent   UB Bathing Assistance 5  Supervision/Setup   LB Bathing Assistance 4  Minimal Assistance   UB Dressing Assistance 5  Supervision/Setup   LB Dressing Assistance 3  Moderate Assistance   Bed Mobility   Rolling R 4  Minimal assistance   Additional items Assist x 1;Increased time required;Verbal cues   Rolling L 4  Minimal assistance   Additional items Assist x 1;Increased time required;Verbal cues   Supine to Sit 4  Minimal assistance   Additional items Assist x 1;Increased time required;Verbal cues;LE management   Transfers   Sit to Stand 4  Minimal assistance   Additional items Assist x 1;Increased time required;Verbal cues   Stand to Sit 4  Minimal assistance   Additional items Assist x 1;Increased time required;Verbal cues   Additional Comments bp's=144/73(EOB)   Functional Mobility   Functional Mobility 4  Minimal assistance   Additional Comments x1   Additional items Rolling walker   Balance   Static Sitting Good   Dynamic Sitting Fair +   Static Standing Fair   Dynamic Standing Fair -   Activity Tolerance   Activity Tolerance Patient limited by fatigue;Treatment limited secondary to medical complications (Comment)   Medical Staff Made Aware " nsg, P.T., CM   RUE Assessment   RUE Assessment WFL   RUE Strength   RUE Overall Strength Within Functional Limits - able to perform ADL tasks with strength  (4/5 throughout)   LUE Assessment   LUE Assessment X  (limited shr AROM(i.e.flex=60-70*); elbow-distal=WFLs)   LUE Strength   LUE Overall Strength   (shr=3-/5, elbow-distal=4/5)   Hand Function   Gross Motor Coordination Functional   Fine Motor Coordination Functional   Sensation   Light Touch No apparent deficits   Proprioception   Proprioception No apparent deficits   Vision-Basic Assessment   Current Vision   (glasses)   Vision - Complex Assessment   Acuity Able to read clock/calendar on wall without difficulty   Psychosocial   Psychosocial (WDL) WDL   Perception   Inattention/Neglect Appears intact   Cognition   Overall Cognitive Status WFL   Arousal/Participation Alert   Attention Attends with cues to redirect   Orientation Level Oriented X4   Memory Decreased recall of precautions   Following Commands Follows one step commands without difficulty   Assessment   Limitation Decreased ADL status;Decreased UE ROM;Decreased UE strength;Decreased Safe judgement during ADL;Decreased endurance;Decreased high-level ADLs   Prognosis Good   Assessment Pt is a 80y/o female admitted to the hospital 2* symptoms of SOB(2 ED visits). Pt noted with A-fib with RVR, acute respiratory distress(currently requiring O2). Pt with PMH, anemia, chemo/radiation, breast Ca/L mastectomy, HTN, hypothyroidism. PTA pt states independence with her ADLs, transfers, ambulation--occasional use of RW; +falls=1, neg home alone( 89y/o); +. During initial eval, pt demonstrated deficits with her functional balance, functional mobility, ADL status, transfer safety, b/l UE strength, and activity tolerance(currently fair=15-20mins). Pt would benefit from continued OT tx for the above deficits. 2-5xwk/1-2wks. The patient's raw score on the AM-PAC Daily Activity Inpatient Short Form is  "18. A raw score of less than 19 suggests the patient may benefit from discharge to post-acute rehabilitation services. Please refer to the recommendation of the Occupational Therapist for safe discharge planning.   Goals   Patient Goals \"to go home\"   STG Time Frame   (1-7 days)   Short Term Goal #1 Pt will demonstrate improved activity tolerance to good(20-30mins) and standing tolerance to 3-5mins to assist with ADLs.   Short Term Goal #2 Pt will demonstrate proper walker/transfer safety 100% of the time.   Short Term Goal  Pt will demonstrate mod I with their sit-stand transfers to assist with completion of their LE dressing.   LTG Time Frame   (7-14 days)   Long Term Goal #1 Pt will demonstrate g/g- balance with all functional activities.   Long Term Goal #2 Pt will demonstrate mod I with their UE and LE bathing/dresssing.   Long Term Goal Pt will demonstrate mod I with their bed mobility to facilitate EOB ADLs.   Plan   Treatment Interventions ADL retraining;Functional transfer training;UE strengthening/ROM;Endurance training;Patient/family training;Equipment evaluation/education;Compensatory technique education;Energy conservation   Goal Expiration Date 04/02/25   OT Treatment Day 0   OT Frequency   (2-5xwk/1-2wks)   Discharge Recommendation   Rehab Resource Intensity Level, OT II (Moderate Resource Intensity)   AM-PAC Daily Activity Inpatient   Lower Body Dressing 2   Bathing 2   Toileting 3   Upper Body Dressing 3   Grooming 4   Eating 4   Daily Activity Raw Score 18   Daily Activity Standardized Score (Calc for Raw Score >=11) 38.66   AM-PAC Applied Cognition Inpatient   Following a Speech/Presentation 4   Understanding Ordinary Conversation 4   Taking Medications 4   Remembering Where Things Are Placed or Put Away 4   Remembering List of 4-5 Errands 4   Taking Care of Complicated Tasks 4   Applied Cognition Raw Score 24   Applied Cognition Standardized Score 62.21   Arturo Joiner         "

## 2025-03-19 NOTE — CONSULTS
Consult - Cardiology   Romayne Geissenhainer 79 y.o. female MRN: [id number]  Unit/Bed#: E4 -01 Encounter: [id number]        Reason For Consult:  atrial fibrillation with rapid ventricular rate                 Assessment:  Bronchitis with possible early pneumonia with SIRS POA   - Sx: Hoarse voice with mild cough without expectoration   - Leukocytosis, tachypnea with low-grade fever (Tmax 99.5)   - CTA 3/18/2025: No PE.  Bilateral lower lobes with attenuation of multiple small airways with patchy alveolar and reticulonodular opacities -findings suggestive of infection  New paroxysmal atrial fibrillation   - Patient subjectively unaware of rate & rhythm   - Eliquis anticoagulation begun 3/19/2025   - Amiodarone initiated 3/19/2025   - Initiated on Lopressor this hospitalization  Essential hypertension   - O/p Rx: hydrochlorothiazide 12.5 mg daily  Coronary artery calcification per chest CT 3/18/2025  Hypothyroidism  Hx breast cancer, s/p chemo/radiation Tx -2014    Discussion / Plan:  # Patient presents complaining of remittent recurrent episodes of dyspnea now correlated to a new diagnosis of paroxysmal atrial fibrillation with rapid ventricular rates present on arrival.   -Patient subjectively unaware of her rate and rhythm   -normal MI profile  -TSH normal (3.57)  -Patient denies signs/symptoms of ROSIE           The natural history of atrial fibrillation, likelihood of future recurrence, risk factors including structural heart disease, alcohol, and ROSIE were discussed, as were therapeutic options.  Echocardiogram forthcoming  With [id number] score of at least 3 she was initiated on heparin ~~> will replace this with Eliquis  Will initiate patient on antiarrhythmic therapy ~~> begin amiodarone 200 mg 3 times daily through 3/26; then 200 mg daily  Yesterday started on Lopressor 25 mg twice daily ~~> continue same  Wean from oxygen ~~> if hypoxic or SMITH suggested treated with a dose of Lasix (otherwise with hold  as she does not examine to be volume overloaded)        History Of Present Illness:  Romayne Geissenhainer is a 79-year-old without prior care by a cardiologist.    Yesterday morning, 3/18/2025, patient came to the emergency department with complaints of dyspnea.  Workup showed normal age-adjusted D-dimer, negative respiratory viral pane, normal troponin, unremarkable chest x-ray and ECG with her later discharged to home from the ED.  Later that afternoon because of increased work of breathing patient return to the ED where she was this time found to be having bouts of intermittent recurrent paroxysmal atrial fibrillation with rapid ventricular rates for which she was admitted and our consultation requested.    Patient denies any personal awareness of her heart rate and rhythm.  She notes that she has for about the last 2 months been having episodes of acute dyspnea typically lasting not more than 15 minutes and having occurred both at rest and with activity.  During these times she was unaware of her heart rate and rhythm, without pain, dizziness, lightheadedness, near-syncope or syncope, or other accompanying symptoms.  Yesterday symptoms were akin to prior events though this time a neighbor encouraged her to seek medical attention.          Past Medical History:        Past Medical History:   Diagnosis Date    Abnormal findings on diagnostic imaging of breast     last assessed 3/19/14    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 (HCC)     last assessed 10/2/15    Disease of thyroid gland     Fibroadenoma of right breast     History of chemotherapy 2014    Hx of radiation therapy     last assessed 9/26/17    Hypertension     Hypokalemia     last assessed 4/15/16    Long term use of drug     herceptin,last assessed 9/26/17    Overweight 03/26/2020    Use of anastrozole (Arimidex)       Past Surgical History:   Procedure Laterality Date    BREAST BIOPSY Left 03/19/2014    IDC    BREAST  BIOPSY Right 2015    FIBROADENOMA     SECTION      X2    IVC FILTER RETRIEVAL  2014    central iv line with subcutaneous reservoir mediaport    LYMPHADENECTOMY Left 2014    axxillary    MASTECTOMY Left 2014    PORTACATH PLACEMENT  2014    SENTINEL LYMPH NODE BIOPSY Left 2014    AND    TONSILLECTOMY  89    TUBAL LIGATION          Allergy:        Allergies   Allergen Reactions    Mushroom Extract Complex - Food Allergy Hives       Medications:       Prior to Admission medications    Medication Sig Start Date End Date Taking? Authorizing Provider   anastrozole (ARIMIDEX) 1 mg tablet TAKE 1 TABLET BY MOUTH EVERY DAY  Patient not taking: Reported on 10/8/2024 6/13/23   Manuela Mcginnis MD   aspirin (ECOTRIN LOW STRENGTH) 81 mg EC tablet Take 81 mg by mouth daily  Patient not taking: Reported on 10/8/2024    Historical Provider, MD   azelastine (ASTELIN) 0.1 % nasal spray 1 spray into each nostril 2 (two) times a day Use in each nostril as directed 3/18/25   Chinmay Griffiths MD   Calcium Carbonate-Vitamin D 600-400 MG-UNIT per tablet Take 2 tablets by mouth daily 10/2/15   Historical Provider, MD   guaiFENesin (MUCINEX) 600 mg 12 hr tablet Take 1 tablet (600 mg total) by mouth 2 (two) times a day for 5 days 3/18/25 3/23/25  Chinmay Griffiths MD   hydroCHLOROthiazide 12.5 mg capsule TAKE 1 CAPSULE BY MOUTH EVERY DAY 25   Dillon Luque DO   levothyroxine 50 mcg tablet TAKE 1 TABLET BY MOUTH EVERY DAY 25   Dillon Luque DO   Multiple Vitamin (MULTIVITAMINS PO) Take by mouth 11/15/17   Historical Provider, MD   pravastatin (PRAVACHOL) 40 mg tablet TAKE 1 TABLET BY MOUTH EVERY DAY 24   Dillon Luque DO       Family History:     Family History   Problem Relation Age of Onset    Heart failure Mother     Coronary artery disease Mother     Diabetes Mother     No Known Problems Father     Breast cancer Sister 68    BRCA1 Negative Sister     No Known  Problems Daughter     No Known Problems Maternal Grandmother     No Known Problems Maternal Grandfather     No Known Problems Paternal Grandmother     No Known Problems Paternal Grandfather     No Known Problems Maternal Aunt         Social History:       Social History     Socioeconomic History    Marital status: /Civil Union     Spouse name: None    Number of children: None    Years of education: None    Highest education level: None   Occupational History    Occupation: retired   Tobacco Use    Smoking status: Never     Passive exposure: Never    Smokeless tobacco: Never   Vaping Use    Vaping status: Never Used   Substance and Sexual Activity    Alcohol use: Yes     Alcohol/week: 2.0 - 3.0 standard drinks of alcohol     Types: 2 - 3 Glasses of wine per week     Comment: social    Drug use: No    Sexual activity: Not Currently     Birth control/protection: Post-menopausal   Other Topics Concern    None   Social History Narrative    Exercises daily     Social Drivers of Health     Financial Resource Strain: Low Risk  (9/28/2023)    Overall Financial Resource Strain (CARDIA)     Difficulty of Paying Living Expenses: Not hard at all   Food Insecurity: No Food Insecurity (3/19/2025)    Nursing - Inadequate Food Risk Classification     Worried About Running Out of Food in the Last Year: Never true     Ran Out of Food in the Last Year: Never true     Ran Out of Food in the Last Year: Never true   Transportation Needs: No Transportation Needs (3/19/2025)    Nursing - Transportation Risk Classification     Lack of Transportation: Not on file     Lack of Transportation: No   Physical Activity: Not on file   Stress: Not on file   Social Connections: Not on file   Intimate Partner Violence: Unknown (3/19/2025)    Nursing IPS     Feels Physically and Emotionally Safe: Not on file     Physically Hurt by Someone: Not on file     Humiliated or Emotionally Abused by Someone: Not on file     Physically Hurt by Someone: No      Hurt or Threatened by Someone: No   Housing Stability: Unknown (3/19/2025)    Nursing: Inadequate Housing Risk Classification     Has Housing: Not on file     Worried About Losing Housing: Not on file     Unable to Get Utilities: Not on file     Unable to Pay for Housing in the Last Year: No     Has Housin       ROS:  Symptoms per HPI  Hoarse voice x 48 hours  Mild nonproductive cough  Ambulates without an assist device  The remainder of the review of systems is negative    Exam:  General: Slight stature.  Alert, normally conversant, comfortable appearing with some hoarseness of her voice.  Head: Normocephalic, atraumatic.  Eyes:  EOMI. Pupils - equal, round, reactive to accomodation.  No icterus.  Normal Conjunctiva.   Oropharynx: Moist without lesion  Neck: The neck with easily palpable thyroid which seems to have normal size and contour.  No gross bruit, JVD, or lymphadenopathy  Heart:  Regular with controlled rate.  ROSANA at lower sternal border to apex  Lungs:  Clear without rales/rhonchi/wheeze  Abdomen:  Soft and nontender with normal bowel sounds. No organomegaly or mass  Lower Limbs:  No edema  Pulses:  RLE - DP:  1-2+                LLE - DP:  1-2+  Musculoskeletal: Independent movement of limbs observed, Formal ROM and strength eval not performed  Neurologic:    Oriented to: person, place, situation.     Cranial Nerves: grossly intact - vision, smell, taste, and hearing were not tested.     Motor function: grossly normal, symmetric   Sensation: Was not tested      Vitals:    25 2329 25 0000 25 0309 25 0736   BP: 129/61 167/70 119/64 121/58   BP Location: Right arm Right arm Right arm Right arm   Pulse:  90 76 81   Resp:    Temp:  99.2 °F (37.3 °C) 99.5 °F (37.5 °C) 97.6 °F (36.4 °C)   TempSrc:  Temporal Temporal Temporal   SpO2: 98% 90% 91% 93%   Weight:               DATA:      ECG:                           -----------------------------------------------------------------------------------------------------------------------------------------------  Telemetry:   Sinus rhythm ventricular rate trend around 80          -----------------------------------------------------------------------------------------------------------------------------------------------  Weights:    Wt Readings from Last 20 Encounters:   03/18/25 53.1 kg (117 lb 1 oz)   10/08/24 52.6 kg (116 lb)   08/02/24 54.3 kg (119 lb 9.6 oz)   06/24/24 57.3 kg (126 lb 5.2 oz)   04/01/24 57.3 kg (126 lb 6.4 oz)   10/18/23 60.8 kg (134 lb)   09/28/23 59.9 kg (132 lb)   07/31/23 61.9 kg (136 lb 6.4 oz)   06/19/23 62.9 kg (138 lb 10.7 oz)   05/18/23 62.9 kg (138 lb 9.6 oz)   03/07/23 64 kg (141 lb)   09/23/22 64.4 kg (142 lb)   08/29/22 65.1 kg (143 lb 8 oz)   07/25/22 66.1 kg (145 lb 12.8 oz)   06/13/22 66.7 kg (147 lb 0.8 oz)   02/21/22 66.7 kg (147 lb)   09/20/21 67.4 kg (148 lb 9.6 oz)   08/09/21 67.4 kg (148 lb 9.6 oz)   07/26/21 67.5 kg (148 lb 12.8 oz)   06/07/21 68 kg (150 lb)   , Body mass index is 20.74 kg/m².         Lab Studies:               Results from last 7 days   Lab Units 03/19/25  0328 03/18/25 2004 03/18/25  1805   WBC Thousand/uL 12.53* 12.99* 12.21*   HEMOGLOBIN g/dL 13.5 15.3 15.7*   HEMATOCRIT % 42.9 48.2* 49.0*   PLATELETS Thousands/uL 215 260 274   ,   Results from last 7 days   Lab Units 03/19/25  0328 03/18/25  1805 03/18/25  0945   POTASSIUM mmol/L 3.5 4.1 4.2   CHLORIDE mmol/L 98 97 98   CO2 mmol/L 33* 31 37*   BUN mg/dL 16 21 18   CREATININE mg/dL 0.25* 0.42* 0.32*   CALCIUM mg/dL 8.8 9.8 9.8   ALK PHOS U/L  --   --  65   ALT U/L  --   --  15   AST U/L  --   --  24

## 2025-03-19 NOTE — ASSESSMENT & PLAN NOTE
SIRS: WBC 12, RR  > 20, tachycardia at 180 bpm   Suspect secondary to acute bronchitis versus early pneumonia.  With superimposed A-fib.  CT chest with motion artifact, however no PE.  Bilateral consolidation at the lower bases, possibly infectious  Procalcitonin and lactic acid WNL  Follow blood cultures  Continue ceftriaxone for now.  May discontinue if procalcitonin remains negative tomorrow as this is likely viral in etiology, with mainly upper respiratory symptoms including laryngitis.  Will start on airway clearance therapy

## 2025-03-19 NOTE — PLAN OF CARE
Problem: OCCUPATIONAL THERAPY ADULT  Goal: Performs self-care activities at highest level of function for planned discharge setting.  See evaluation for individualized goals.  Description: Treatment Interventions: ADL retraining, Functional transfer training, UE strengthening/ROM, Endurance training, Patient/family training, Equipment evaluation/education, Compensatory technique education, Energy conservation          See flowsheet documentation for full assessment, interventions and recommendations.   Note: Limitation: Decreased ADL status, Decreased UE ROM, Decreased UE strength, Decreased Safe judgement during ADL, Decreased endurance, Decreased high-level ADLs  Prognosis: Good  Assessment: Pt is a 80y/o female admitted to the hospital 2* symptoms of SOB(2 ED visits). Pt noted with A-fib with RVR, acute respiratory distress(currently requiring O2). Pt with PMH, anemia, chemo/radiation, breast Ca/L mastectomy, HTN, hypothyroidism. PTA pt states independence with her ADLs, transfers, ambulation--occasional use of RW; +falls=1, neg home alone( 89y/o); +. During initial eval, pt demonstrated deficits with her functional balance, functional mobility, ADL status, transfer safety, b/l UE strength, and activity tolerance(currently fair=15-20mins). Pt would benefit from continued OT tx for the above deficits. 2-5xwk/1-2wks. The patient's raw score on the AM-PAC Daily Activity Inpatient Short Form is 18. A raw score of less than 19 suggests the patient may benefit from discharge to post-acute rehabilitation services. Please refer to the recommendation of the Occupational Therapist for safe discharge planning.     Rehab Resource Intensity Level, OT: II (Moderate Resource Intensity)

## 2025-03-19 NOTE — QUICK NOTE
Night Progress Note 03/19/25:  Paged regarding tachycardia, HR 160s. Patient evaluated, no acute distress or complaints.     Assessment & Plan:  AFRVR  Responded well to initial cardizem bolus, will start infusion  Lopressor 25mg BID to start in AM  Anticoagulated on heparin

## 2025-03-19 NOTE — ED NOTES
Patients HR shot back up to 200s. Attending provider notified via priority alert on Epic chat. Pt tried vasovagal maneuvers and was unsuccessful.      Regine Bowens RN  03/18/25 [id number]

## 2025-03-19 NOTE — PLAN OF CARE
Problem: Potential for Falls  Goal: Patient will remain free of falls  Description: INTERVENTIONS:  - Educate patient/family on patient safety including physical limitations  - Instruct patient to call for assistance with activity   - Consult OT/PT to assist with strengthening/mobility   - Keep Call bell within reach  - Keep bed low and locked with side rails adjusted as appropriate  - Keep care items and personal belongings within reach  - Initiate and maintain comfort rounds  - Make Fall Risk Sign visible to staff  - Offer Toileting every 2 Hours, in advance of need  - Initiate/Maintain bed alarm  - Obtain necessary fall risk management equipment:   - Apply yellow socks and bracelet for high fall risk patients  - Consider moving patient to room near nurses station  Outcome: Progressing     Problem: PAIN - ADULT  Goal: Verbalizes/displays adequate comfort level or baseline comfort level  Description: Interventions:  - Encourage patient to monitor pain and request assistance  - Assess pain using appropriate pain scale  - Administer analgesics based on type and severity of pain and evaluate response  - Implement non-pharmacological measures as appropriate and evaluate response  - Consider cultural and social influences on pain and pain management  - Notify physician/advanced practitioner if interventions unsuccessful or patient reports new pain  Outcome: Progressing     Problem: INFECTION - ADULT  Goal: Absence or prevention of progression during hospitalization  Description: INTERVENTIONS:  - Assess and monitor for signs and symptoms of infection  - Monitor lab/diagnostic results  - Monitor all insertion sites, i.e. indwelling lines, tubes, and drains  - Monitor endotracheal if appropriate and nasal secretions for changes in amount and color  - [place] appropriate cooling/warming therapies per order  - Administer medications as ordered  - Instruct and encourage patient and family to use good hand hygiene  technique  - Identify and instruct in appropriate isolation precautions for identified infection/condition  Outcome: Progressing  Goal: Absence of fever/infection during neutropenic period  Description: INTERVENTIONS:  - Monitor WBC    Outcome: Progressing

## 2025-03-19 NOTE — CASE MANAGEMENT
Case Management Assessment & Discharge Planning Note    Patient name Romayne Geissenhainer  [facility] /E4 -* MRN [id number]  : 1945 Date 3/19/2025       Current Admission Date: 3/18/2025  Current Admission Diagnosis:Atrial fibrillation with RVR (HCC)   Patient Active Problem List    Diagnosis Date Noted Date Diagnosed    Sepsis (HCC) 2025     Atrial fibrillation with RVR (HCC) 2025     Essential (hemorrhagic) thrombocythemia (HCC) 2022     Aspirin long-term use 2020     Osteopenia 2019     Diverticulosis 2019     Occult blood in stools 2019     Myalgia 2018     Chronic pain of both knees 2018     History of breast cancer      Menopause 2017     Hyperglycemia 2014     Hyperlipidemia 2012     Hypertension 2012     Hypothyroidism 2012       LOS (days): 0  Geometric Mean LOS (GMLOS) (days):   Days to GMLOS:     OBJECTIVE:              Current admission status: Inpatient       Preferred Pharmacy:   [id number] [place] - [address] [place] [address]  [address] [place] [address]  Phone: [phone] Fax: [phone]    Primary Care Provider: Dejan Trevino MD    Primary Insurance: MEDICARE  Secondary Insurance: [employer]    ASSESSMENT:  Active Health Care Proxies    There are no active Health Care Proxies on file.       Advance Directives  Does patient have a Health Care POA?: Yes  Does patient have Advance Directives?: Yes  Advance Directives: Power of  for health care  Primary Contact: - Dagoberto         Readmission Root Cause  30 Day Readmission: No    Patient Information  Admitted from:: Home  Mental Status: Alert  During Assessment patient was accompanied by: Not accompanied during assessment  Assessment information provided by:: Patient  Primary Caregiver: Spouse  Caregiver's Name:: Dagoberto-   Caregiver's Relationship to Patient:: Family  Member  Caregiver's Telephone Number:: [phone]  Support Systems: Self, Spouse/significant other, Son  County of Residence: [place]  What city do you live in?: [place]  Home entry access options. Select all that apply.: Stairs  Number of steps to enter home.: 2  Do the steps have railings?: Yes  Type of Current Residence: 2 story home (2 story twin.)  Upon entering residence, is there a bedroom on the main floor (no further steps)?: Yes  Upon entering residence, is there a bathroom on the main floor (no further steps)?: Yes  Living Arrangements: Lives w/ Spouse/significant other  Is patient a ?: No    Activities of Daily Living Prior to Admission  Functional Status: Independent  Completes ADLs independently?: Yes  Ambulates independently?: Yes  Does patient use assisted devices?: Yes  Assisted Devices (DME) used: Straight Cane, Shower Chair, Bedside Commode, Walker  Does patient currently own DME?: Yes  What DME does the patient currently own?: Bedside Commode, Shower Chair, Straight Cane, Walker  Does patient have a history of Outpatient Therapy (PT/OT)?: No  Does the patient have a history of Short-Term Rehab?: No  Does patient have a history of HHC?: No  Does patient currently have HHC?: No         Patient Information Continued  Income Source: Pension/FDC  Does patient have prescription coverage?: Yes  Can the patient afford their medications and any related supplies (such as glucometers or test strips)?: Yes  Does patient receive dialysis treatments?: No  Does patient have a history of substance abuse?: No  Does patient have a history of Mental Health Diagnosis?: No         Means of Transportation  Means of Transport to Appts:: Drives Self          DISCHARGE DETAILS:    Discharge planning discussed with:: Pt  Freedom of Choice: Yes  Comments - Freedom of Choice: Home vs home with outpt services  CM contacted family/caregiver?: No- see comments (able to answer for herself)  Were Treatment Team  discharge recommendations reviewed with patient/caregiver?: Yes  Did patient/caregiver verbalize understanding of patient care needs?: Yes  Were patient/caregiver advised of the risks associated with not following Treatment Team discharge recommendations?: Yes    Contacts  Patient Contacts: Pt  Relationship to Patient:: Other (Comment) (self)  Contact Method: In Person  Reason/Outcome: Continuity of Care, Discharge Planning, Emergency Contact         DME Referral Provided  Referral made for DME?: No         Would you like to participate in our [facility] service program?  : No - Declined                                                 Additional Comments: CM met with the pt and made her aware of the CM role.  Assessment was done.  Pt lives her spouse.  Both remain independent.  She still drives and she and her  makes sure they are able to get to their appointments.  Does not use O2 at baseline and currently on 4l NC.  Pt agreeable to haveing VNA if necessary and referrals will be made.  She was infomred that O2 req. study may be needed if they are not able to wean off of the O2.  she verblaized understanding.  CM to follow.

## 2025-03-19 NOTE — PROGRESS NOTES
Progress Note - Hospitalist   Name: Romayne Geissenhainer 79 y.o. female I MRN: [id number]  Unit/Bed#: E4 -01 I Date of Admission: 3/18/2025   Date of Service: 3/19/2025 I Hospital Day: 0    Assessment & Plan  Sepsis (HCC)  SIRS: WBC 12, RR  > 20, tachycardia at 180 bpm   Suspect secondary to acute bronchitis versus early pneumonia.  With superimposed A-fib.  CT chest with motion artifact, however no PE.  Bilateral consolidation at the lower bases, possibly infectious  Procalcitonin and lactic acid WNL  Follow blood cultures  Continue ceftriaxone for now.  May discontinue if procalcitonin remains negative tomorrow as this is likely viral in etiology, with mainly upper respiratory symptoms including laryngitis.  Will start on airway clearance therapy  Atrial fibrillation with RVR (HCC)  Likely precipitated by respiratory infection  [id number] score 4 due to age, sex and hypertension  Temporarily required Cardizem gtt.  Converted to NSR overnight  Cardiology evaluated.  Cardizem drip discontinued.  Heparin drip discontinued.    Started on Eliquis, metoprolol and amiodarone  Hypertension  Hold HCTZ in place of Cardizem  Hypothyroidism  TSH WNL  Continue levothyroxine 50 mcg for now    VTE Pharmacologic Prophylaxis: VTE Score: 4 Moderate Risk (Score 3-4) - Pharmacological DVT Prophylaxis Ordered: apixaban (Eliquis).    Mobility:      -[facility] Goal achieved. Continue to encourage appropriate mobility.    Patient Centered Rounds: I performed bedside rounds with nursing staff today.   Discussions with Specialists or Other Care Team Provider: Cardiology    Education and Discussions with Family / Patient: Patient reports her  has dementia and does not require update    Current Length of Stay: 0 day(s)  Current Patient Status: Observation   Certification Statement: The patient will continue to require additional inpatient hospital stay due to treatment for A-fib as well as sepsis  Discharge Plan: Anticipate  discharge in 48-72 hrs to home.    Code Status: Level 1 - Full Code    Subjective   No acute events overnight.  Patient reports worsening laryngitis and cough, however no mucus production/expectoration.  Reports feeling sick, however improved from yesterday.  Potation's, chest pain, dyspnea at rest.    Objective :  Temp:  [97.6 °F (36.4 °C)-99.5 °F (37.5 °C)] 97.6 °F (36.4 °C)  HR:  [] 81  BP: (101-167)/(57-78) 121/58  Resp:  [18-23] 18  SpO2:  [90 %-99 %] 93 %  O2 Device: Nasal cannula  Nasal Cannula O2 Flow Rate (L/min):  [3 L/min-4 L/min] 4 L/min    Body mass index is 20.74 kg/m².     Input and Output Summary (last 24 hours):     Intake/Output Summary (Last 24 hours) at 3/19/2025 1049  Last data filed at 3/19/2025 0853  Gross per 24 hour   Intake --   Output 100 ml   Net -100 ml       Physical Exam  Vitals and nursing note reviewed.   Constitutional:       Appearance: Normal appearance.   HENT:      Head: Normocephalic and atraumatic.      Mouth/Throat:      Mouth: Mucous membranes are moist.      Pharynx: Oropharynx is clear. No oropharyngeal exudate.      Comments: Raspy voice  Eyes:      Extraocular Movements: Extraocular movements intact.   Cardiovascular:      Rate and Rhythm: Normal rate and regular rhythm.      Pulses: Normal pulses.      Heart sounds: Normal heart sounds. No murmur heard.     No friction rub. No gallop.   Pulmonary:      Effort: Pulmonary effort is normal. No respiratory distress.      Breath sounds: Normal breath sounds. No stridor. No wheezing or rales.      Comments: Upper respiratory crackles  Abdominal:      General: Abdomen is flat. Bowel sounds are normal. There is no distension.      Palpations: Abdomen is soft.      Tenderness: There is no abdominal tenderness.   Musculoskeletal:      Right lower leg: No edema.      Left lower leg: No edema.   Skin:     General: Skin is warm and dry.   Neurological:      General: No focal deficit present.      Mental Status: She is alert  and oriented to person, place, and time.           Lines/Drains:        Telemetry:  Telemetry Orders (From admission, onward)               24 Hour Telemetry Monitoring  (ED Bridging Orders Panel)  Continuous x 24 Hours (Telem)        Expiring   Question:  Reason for 24 Hour Telemetry  Answer:  Decompensated CHF- and any one of the following: continuous diuretic infusion or total diuretic dose >200 mg daily, associated electrolyte derangement (I.e. K < 3.0), inotropic drip (continuous infusion), hx of ventricular arrhythmia, or new EF < 35%                     Telemetry Reviewed: Normal Sinus Rhythm  Indication for Continued Telemetry Use: Arrthymias requiring medical therapy               Lab Results: I have reviewed the following results:   Results from last 7 days   Lab Units 03/19/25 0328 03/18/25 2004 03/18/25 1805   WBC Thousand/uL 12.53*   < > 12.21*   HEMOGLOBIN g/dL 13.5   < > 15.7*   HEMATOCRIT % 42.9   < > 49.0*   PLATELETS Thousands/uL 215   < > 274   BANDS PCT % 7  --   --    SEGS PCT %  --   --  89*   LYMPHO PCT % 4*  --  6*   MONO PCT % 3*  --  5   EOS PCT % 0  --  0    < > = values in this interval not displayed.     Results from last 7 days   Lab Units 03/19/25 0328 03/18/25  1805 03/18/25  0945   SODIUM mmol/L 139   < > 141   POTASSIUM mmol/L 3.5   < > 4.2   CHLORIDE mmol/L 98   < > 98   CO2 mmol/L 33*   < > 37*   BUN mg/dL 16   < > 18   CREATININE mg/dL 0.25*   < > 0.32*   ANION GAP mmol/L 8   < > 6   CALCIUM mg/dL 8.8   < > 9.8   ALBUMIN g/dL  --   --  4.1   TOTAL BILIRUBIN mg/dL  --   --  0.67   ALK PHOS U/L  --   --  65   ALT U/L  --   --  15   AST U/L  --   --  24   GLUCOSE RANDOM mg/dL 101   < > 106    < > = values in this interval not displayed.     Results from last 7 days   Lab Units 03/18/25 2004   INR  0.97             Results from last 7 days   Lab Units 03/19/25 0328 03/19/25  0016 03/18/25 2004   LACTIC ACID mmol/L  --  1.7  --    PROCALCITONIN ng/ml 0.13  --  0.07       Recent  Cultures (last 7 days):   Results from last 7 days   Lab Units 03/18/25 2023 03/18/25 2004   BLOOD CULTURE  Received in Microbiology Lab. Culture in Progress. Received in Microbiology Lab. Culture in Progress.       Imaging Results Review: No pertinent imaging studies reviewed.  Other Study Results Review: No additional pertinent studies reviewed.    Last 24 Hours Medication List:     Current Facility-Administered Medications:     amiodarone tablet 200 mg, TID With Meals    [START ON 3/27/2025] amiodarone tablet 200 mg, Daily With Breakfast    apixaban (ELIQUIS) tablet 5 mg, BID    cefTRIAXone (ROCEPHIN) 2,000 mg in dextrose 5 % 50 mL IVPB, Q24H, Last Rate: 2,000 mg (03/19/25 0038)    diltiazem (CARDIZEM) injection 10 mg, Q4H PRN    guaiFENesin (MUCINEX) 12 hr tablet 600 mg, Q12H OSEAS    levothyroxine tablet 50 mcg, Early Morning    metoprolol tartrate (LOPRESSOR) tablet 25 mg, Q12H OSEAS    multi-electrolyte (Plasmalyte-A/Isolyte-S PH 7.4/Normosol-R) IV solution, Continuous, Last Rate: 100 mL/hr (03/19/25 0038)    pravastatin (PRAVACHOL) tablet 40 mg, Daily With Dinner    sodium chloride 3 % inhalation solution 4 mL, Q6H    Administrative Statements   Today, Patient Was Seen By: Hema Perkins PA-C      **Please Note: This note may have been constructed using a voice recognition system.**

## 2025-03-19 NOTE — ASSESSMENT & PLAN NOTE
Likely precipitated by respiratory infection  [id number] score 4 due to age, sex and hypertension  Temporarily required Cardizem gtt.  Converted to NSR overnight  Cardiology evaluated.  Cardizem drip discontinued.  Heparin drip discontinued.    Started on Eliquis, metoprolol and amiodarone

## 2025-03-20 PROBLEM — I48.91 ATRIAL FIBRILLATION WITH RVR (HCC): Status: RESOLVED | Noted: 2025-01-01 | Resolved: 2025-01-01

## 2025-03-20 PROBLEM — J96.01 ACUTE HYPOXIC RESPIRATORY FAILURE (HCC): Status: ACTIVE | Noted: 2025-01-01

## 2025-03-20 PROBLEM — I47.20 VENTRICULAR TACHYCARDIA (HCC): Status: ACTIVE | Noted: 2025-01-01

## 2025-03-20 PROBLEM — A41.9 SEPSIS (HCC): Status: RESOLVED | Noted: 2025-01-01 | Resolved: 2025-01-01

## 2025-03-20 PROBLEM — A41.9 SEPTIC SHOCK (HCC): Status: ACTIVE | Noted: 2025-01-01

## 2025-03-20 PROBLEM — I48.0 PAROXYSMAL ATRIAL FIBRILLATION WITH RAPID VENTRICULAR RESPONSE (HCC): Status: RESOLVED | Noted: 2025-01-01 | Resolved: 2025-01-01

## 2025-03-20 PROBLEM — R65.21 SEPTIC SHOCK (HCC): Status: ACTIVE | Noted: 2025-01-01

## 2025-03-20 PROBLEM — J18.9 PNEUMONIA: Status: ACTIVE | Noted: 2025-01-01

## 2025-03-20 PROBLEM — I71.21 ANEURYSM OF ASCENDING AORTA WITHOUT RUPTURE (HCC): Status: ACTIVE | Noted: 2025-01-01

## 2025-03-20 NOTE — ASSESSMENT & PLAN NOTE
Patient to be extubated today.  Will undergo swallow evaluation and evaluation of vocal cords if she continues to have severe dysphonia or signs of aspiration.

## 2025-03-20 NOTE — QUICK NOTE
Juan Miguel Pablo updated with current patient status. Samy verbalizes understanding of patients current status. All questions answered and Samy made aware that patient will remain in ICU tonight with close monitoring.

## 2025-03-20 NOTE — CASE MANAGEMENT
Case Management Discharge Planning Note    Patient name Romayne Geissenhainer  Location ICU 14/ICU 14 MRN [id number]  : 1945 Date 3/20/2025       Current Admission Date: 3/18/2025  Current Admission Diagnosis:Hypothyroidism   Patient Active Problem List    Diagnosis Date Noted Date Diagnosed    Ventricular tachycardia (HCC) 2025     Septic shock (HCC) 2025     Pneumonia 2025     Acute hypoxic respiratory failure (HCC) 2025     Aneurysm of ascending aorta without rupture (HCC) 2025     Essential (hemorrhagic) thrombocythemia (HCC) 2022     Aspirin long-term use 2020     Osteopenia 2019     Diverticulosis 2019     Occult blood in stools 2019     Myalgia 2018     Chronic pain of both knees 2018     History of breast cancer      Menopause 2017     Hyperglycemia 2014     Hyperlipidemia 2012     Hypothyroidism 2012       LOS (days): 1  Geometric Mean LOS (GMLOS) (days): 4.9  Days to GMLOS:3.9     OBJECTIVE:  Risk of Unplanned Readmission Score: 16.67         Current admission status: Inpatient   Preferred Pharmacy:   [facility]/pharmacy #[id number] - [place] - [facility]  [address] [place] [address]  JEANE FISHER [address]  Phone: [phone] Fax: [phone]    Primary Care Provider: Dejan Trevino MD    Primary Insurance: MEDICARE  Secondary Insurance: [facility] HE[employer] OPTIONS PROGRAM    DISCHARGE DETAILS:      Additional Comments: Patient is intubated and sedated and is not medically ready for discharge.  CM department will continue to follow for discharge planning.

## 2025-03-20 NOTE — ASSESSMENT & PLAN NOTE
Diagnoses: Septic shock 2/2 PNA  Culture data: FLU/COVID/RSV negative.  Procal 0.84 (3/19)  Temperature: afebrile   Plan:  Continue MV as above   F/u new BC   Abx: CTX D2 currently  Patient lives at home. Will continue trmt for CAP   Obtain MRSA swab  Obtain strep/legionella AG and sputum culture  Monitor for signs/sx of infection   Monitor fever curve and WBC

## 2025-03-20 NOTE — RESPIRATORY THERAPY NOTE
03/19/25 2274   Respiratory Assessment   Resp Comments 23 at the [facility]   Vent Information   Vent ID [id number]   Vent type Garcia G5   Blount Vent Mode (S)CMV   $ Vent Charge-INITIAL Yes   Ventilator Start Yes   $ Pulse Oximetry Spot Check Charge Completed   SpO2 97 %   (S)CMV Settings   Resp Rate (BPM) 22 BPM   VT (mL) 500 mL   FIO2 (%) 100 %   PEEP (cmH2O) 6 cmH2O   I:E Ratio 1:1.7   Insp Time (%) 1 %   Flow Trigger (LPM) 2   Humidification Heater   Heater Temperature (Set) 98.6 °F (37 °C)   Pause Time (%) 0 %   (S)CMV Actuals   Resp Rate (BPM) 23 BPM   VT (mL) 529   MV 12   MAP (cmH2O) 14 cmH2O   Peak Pressure (cmH2O) 29 cmH2O   I:E Ratio (Obs) 1:1.7   Insp Resistance 17   Heater Temperature (Obs) 98.6 °F (37 °C)   Static Compliance (mL/cmH20) 41.9 mL/cmH2O   (S)CMV Alarms   High Peak Pressure (cmH2O) 40   Low Pressure (cmH2O) 5 cm H2O   High Resp Rate (BPM) 40 BPM   Low Resp Rate (BPM) 8 BPM   High MV (L/min) 20 L/min   Low MV (L/min) 4 L/min   High VT (mL) 1000 mL   Low VT (mL) 250 mL   Leak (%) 0 %   Apnea Time (s) 20 S   Maintenance   Alarm (pink) cable attached No   Resuscitation bag with peep valve at bedside Yes   Water bag changed No   Circuit changed No   Respiratory Charges    $ Intubation/Intubation Assist Yes

## 2025-03-20 NOTE — OCCUPATIONAL THERAPY NOTE
Occupational Therapy         Patient Name: Romayne Geissenhainer  Today's Date: 3/20/2025       03/20/25 0739   OT Last Visit   OT Visit Date 03/20/25   Note Type   Note type Cancelled Session   Cancel Reasons Intubated/sedated     Millie Manzano

## 2025-03-20 NOTE — PROCEDURES
Intubation    Date/Time: 3/19/2025 11:26 PM    Performed by: Vangie Spear PA-C  Authorized by: Vangie Spear PA-C    Patient location:  Bedside  Consent:     Consent obtained:  Emergent situation  Universal protocol:     Relevant documents present and verified: yes      Test results available and properly labeled: yes      Radiology Images displayed and confirmed.  If images not available, report reviewed: yes      Required blood products, implants, devices, and special equipment available: yes      Immediately prior to procedure, a time out was called: yes      Patient identity confirmed:  Anonymous protocol, patient vented/unresponsive  Pre-procedure details:     Patient status:  Altered mental status    Pretreatment medications:  Etomidate    Paralytics:  None  Indications:     Indications for intubation: respiratory failure, airway protection and hypercapnia    Procedure details:     Preoxygenation:  Bag valve mask    CPR in progress: no      Intubation method:  Oral    Laryngoscope blade:  Mac 3    Tube size (mm):  7.5    Tube type:  Cuffed    Number of attempts:  1    Cricoid pressure: yes      Tube visualized through cords: yes    Placement assessment:     ETT to lip:  23    Tube secured with:  ETT lund    Breath sounds:  Equal    Placement verification: chest rise, condensation, colorimetric ETCO2 device, direct visualization, equal breath sounds and tube exhalation      CXR findings:  ETT in proper place  Post-procedure details:     Patient tolerance of procedure:  Tolerated well, no immediate complications

## 2025-03-20 NOTE — ASSESSMENT & PLAN NOTE
Patient had several bouts of atrial fibrillation with rapid ventricular response last night.  There was interval widening of QRS complex during the atrial fibrillation suggesting that the wide-complex rhythm was atrial fibrillation with aberrancy rather than ventricular tachycardia.  Currently on amiodarone infusion at 0.5 mg/min.  Is currently on norepinephrine at 7 mcg/min.  Blood pressure is borderline.  Blood work significant for sodium 139 potassium 3.1 chloride 95 bicarb 24 BUN 18 creatinine 0.37 TP 4.90 albumin 2.7  INR 1.67  TTE yesterday showed moderate concentric LVH function EF around 57%, grade 1 diastolic dysfunction, normal RV size and function, aortic valve sclerosis with mild AI, mild MR and TR, no obvious pulmonary hypertension no pericardial effusion.    After patient is extubated we will can initiate amiodarone 200 mg p.o. 3 times daily for 7 days followed by 200 mg daily.  Can discontinue amiodarone drip 4 hours after the first p.o. dose.  Continue heparin drip with plan to transition to Eliquis 5 mg twice daily later today if she is stable.  Continue postextubation care and treatment for suspected pneumonia.  Wean off of norepinephrine.  Restart beta-blocker therapy with metoprolol tartrate 12.5 mg twice daily when systolic blood pressures are consistently greater than 95 mmHg.  Continued potassium replacement with recheck of potassium and magnesium with a.m. labs.  Patient will need education about amiodarone and related to possible side effects and toxicities and need for monitoring.

## 2025-03-20 NOTE — ASSESSMENT & PLAN NOTE
Diagnoses: Septic shock 2/2 PNA with worsening leukocytosis and neutrophilia   Culture data: FLU/COVID/RSV negative., 3/18 BC- ngtd   Procal 0.84 (3/19)  Temperature: afebrile   Plan:  F/u new BC   Abx: CTX D2 currently  Patient lives at home. Will continue trmt for CAP   Obtain MRSA swab  Legionella and Streptococcus pneumonia unremarkable  Follow-up with blood culture, sputum culture, MRSA nares  Severe CAP But drip score is low  Monitor for signs/sx of infection   Monitor fever curve and WBC

## 2025-03-20 NOTE — ASSESSMENT & PLAN NOTE
Review of telemetry strips suggest it was atrial fibrillation with aberrancy rather than ventricular tachycardia.

## 2025-03-20 NOTE — ASSESSMENT & PLAN NOTE
Noted to have severe aneurysm of ascending aorta measuring up to 2/10.  There is significant interval increase compared to prior imaging from 2021.  No evidence of dissection.  Patient has no history of connective tissue disease.  Has not been a smoker.  Minimize surgery right send intrathoracic blood pressures.  Should be on appropriate blood pressure control regimen.  Start  Will consider outpatient referral to cardiac surgery although she is not a candidate for aortic surgery at this time.

## 2025-03-20 NOTE — PROGRESS NOTES
Progress Note - Cardiology   Name: Romayne Geissenhainer 79 y.o. female I MRN: [id number]  Unit/Bed#: ICU 14 I Date of Admission: 3/18/2025   Date of Service: 3/20/2025 I Hospital Day: 1    Assessment & Plan  Paroxysmal atrial fibrillation with rapid ventricular response (HCC) (Resolved: 3/20/2025)  Patient had several bouts of atrial fibrillation with rapid ventricular response last night.  There was interval widening of QRS complex during the atrial fibrillation suggesting that the wide-complex rhythm was atrial fibrillation with aberrancy rather than ventricular tachycardia.  Currently on amiodarone infusion at 0.5 mg/min.  Is currently on norepinephrine at 7 mcg/min.  Blood pressure is borderline.  Blood work significant for sodium 139 potassium 3.1 chloride 95 bicarb 24 BUN 18 creatinine 0.37 TP 4.90 albumin 2.7  INR 1.67  TTE yesterday showed moderate concentric LVH function EF around 57%, grade 1 diastolic dysfunction, normal RV size and function, aortic valve sclerosis with mild AI, mild MR and TR, no obvious pulmonary hypertension no pericardial effusion.    After patient is extubated we will can initiate amiodarone 200 mg p.o. 3 times daily for 7 days followed by 200 mg daily.  Can discontinue amiodarone drip 4 hours after the first p.o. dose.  Continue heparin drip with plan to transition to Eliquis 5 mg twice daily later today if she is stable.  Continue postextubation care and treatment for suspected pneumonia.  Wean off of norepinephrine.  Restart beta-blocker therapy with metoprolol tartrate 12.5 mg twice daily when systolic blood pressures are consistently greater than 95 mmHg.  Continued potassium replacement with recheck of potassium and magnesium with a.m. labs.  Patient will need education about amiodarone and related to possible side effects and toxicities and need for monitoring.     Septic shock (HCC)  Possible sepsis secondary to pneumonia.  Patient slightly better compared to last night  estimation.  Continue respiratory care and antibiotic therapy.  Pneumonia  Possible pneumonia with mucous plugging in the region.  Appreciate evaluation and management.  Hypothyroidism  Patient has known hypothyroidism and is on Synthroid therapy.  She will need close monitoring for amiodarone related worsening hypothyroidism or precipitating hyperthyroid state.  Ventricular tachycardia (HCC)  Review of telemetry strips suggest it was atrial fibrillation with aberrancy rather than ventricular tachycardia.  Acute hypoxic respiratory failure (HCC)  Patient to be extubated today.  Will undergo swallow evaluation and evaluation of vocal cords if she continues to have severe dysphonia or signs of aspiration.  Aneurysm of ascending aorta without rupture (HCC)  Noted to have severe aneurysm of ascending aorta measuring up to 2/10.  There is significant interval increase compared to prior imaging from 2021.  No evidence of dissection.  Patient has no history of connective tissue disease.  Has not been a smoker.  Minimize surgery right send intrathoracic blood pressures.  Should be on appropriate blood pressure control regimen.  Start  Will consider outpatient referral to cardiac surgery although she is not a candidate for aortic surgery at this time.    Subjective   Chief Complaint: Follow-up for paroxysmal atrial fibrillation with rapid ventricular response.    Interval developments noted.  Patient had rapid response with acute hypoxemia on the medical floor.  Was brought down to the unit.  Where she developed A-fib with RVR and she also had wide-complex rhythm concerning for ventricular tachycardia.  She was loaded with IV amiodarone and became hypotensive and required vasopressor support.  This morning she is hemodynamically stable.  She is being extubated.  She remains in sinus rhythm currently.    Objective :  Temp:  [96.8 °F (36 °C)-97.9 °F (36.6 °C)] 97 °F (36.1 °C)  HR:  [] 94  BP: ()/(35-78)  104/58  Resp:  [11-31] 29  SpO2:  [73 %-100 %] 99 %  O2 Device: Ventilator  Nasal Cannula O2 Flow Rate (L/min):  [4 L/min-7 L/min] 7 L/min  FiO2 (%):  [70] 70  Orthostatic Blood Pressures      Flowsheet Row Most Recent Value   Blood Pressure 104/58 filed at 03/20/2025 0845   Patient Position - Orthostatic VS Lying filed at 03/20/2025 0400          First Weight: Weight - Scale: 53.1 kg (117 lb 1 oz) (03/18/25 1707)  Vitals:    03/20/25 0200 03/20/25 0551   Weight: 55 kg (121 lb 4.1 oz) 55 kg (121 lb 4.1 oz)     Physical Exam  Constitutional:       Appearance: She is underweight.      Interventions: She is intubated.      Comments: Has NG tube.   Neck:      Comments: Unable to assess ventricular venous distention.  Cardiovascular:      Rate and Rhythm: Normal rate and regular rhythm.      Heart sounds: No murmur heard.  Pulmonary:      Effort: She is intubated.      Breath sounds: Examination of the right-upper field reveals decreased breath sounds. Examination of the left-upper field reveals decreased breath sounds. Examination of the right-middle field reveals decreased breath sounds and rhonchi. Examination of the left-middle field reveals decreased breath sounds and rhonchi. Examination of the right-lower field reveals rhonchi. Examination of the left-lower field reveals rhonchi. Decreased breath sounds and rhonchi present.   Chest:      Chest wall: No edema.   Musculoskeletal:      Cervical back: Neck supple.      Right lower leg: No edema.      Left lower leg: No edema.   Skin:     General: Skin is dry.      Coloration: Skin is pale.   Neurological:      Mental Status: She is alert and oriented to person, place, and time.      Comments: Is currently off of sedation anticipation of extubation.    Responds to questions appropriately nodding head.   Psychiatric:         Behavior: Behavior normal.         Lab Results: I have reviewed the following results:CBC/BMP:   .     03/20/25  0444   WBC 17.27*   HGB 11.2*    HCT 35.0      SODIUM 141   K 3.1*   CL 99   CO2 34*   BUN 18   CREATININE 0.37*   GLUC 188*   CAIONIZED 1.11*   MG 2.7   PHOS 2.1*      Results from last 7 days   Lab Units 03/20/25 0444 03/19/25 2352 03/19/25 2347 03/19/25 2241 03/19/25 0328   WBC Thousand/uL 17.27*  --  21.66*  --  12.53*   HEMOGLOBIN g/dL 11.2*  --  13.8  --  13.5   I STAT HEMOGLOBIN g/dl  --  13.9  --    < >  --    HEMATOCRIT % 35.0  --  45.2  --  42.9   HEMATOCRIT, ISTAT %  --  41  --    < >  --    PLATELETS Thousands/uL 208  --  254  --  215    < > = values in this interval not displayed.     Results from last 7 days   Lab Units 03/20/25 0444 03/19/25 2352 03/19/25 2347 03/19/25 2241 03/19/25 0328   POTASSIUM mmol/L 3.1*  --  3.7  --  3.5   CHLORIDE mmol/L 99  --  99  --  98   CO2 mmol/L 34*  --  34*  --  33*   CO2, I-STAT mmol/L  --  35*  --    < >  --    BUN mg/dL 18  --  22  --  16   CREATININE mg/dL 0.37*  --  0.49*  --  0.25*   GLUCOSE, ISTAT mg/dl  --  238*  --    < >  --    CALCIUM mg/dL 8.4  --  9.7  --  8.8    < > = values in this interval not displayed.     Results from last 7 days   Lab Units 03/20/25  1001 03/20/25 0444 03/20/25 0052 03/19/25 0328 03/18/25 2004   INR   --  1.67* 1.45*  --  0.97   PTT seconds 61* 52*  --  34 29     Lab Results   Component Value Date    HGBA1C 5.5 03/20/2025     Lab Results   Component Value Date    CKTOTAL 82 03/19/2025       Imaging Results Review: I personally reviewed the following image studies in PACS and associated radiology reports: CT chest and Echocardiogram. My interpretation of the radiology images/reports is: CTA chest last night was negative for pulmonary embolism.  There was extensive debris within distal left mainstem bronchus with left lower lobe and lingular involvement.  Possible bilateral lower lobe pneumonia.  Trace left-sided pleural effusion, ascending thoracic aorta aneurysm measuring 4.9 cm.  Ascending thoracic aorta measuring 3.7 cm.  Other Study  Results Review: EKG was reviewed.     VTE Pharmacologic Prophylaxis: VTE covered by:  heparin (porcine), Intravenous, 12 Units/kg/hr at 03/20/25 0350  heparin (porcine), Intravenous  heparin (porcine), Intravenous

## 2025-03-20 NOTE — QUICK NOTE
Spoke with son Irineo who called back. Updated him on patient's clinical condition. He is understanding and appreciative of care. Patient's  has dementia and is 89 with previous CVA hx, therefore son has POA per PA . He changed her code status to Level 2- DNR given her hx of deconditioning over the last year.     He stated he believes his mother drinks 4 glasses of wine/night at least. He notes worsening depression over the last year since her  had a stroke. He states she does not drive or grocery shop, her neighbors buy alcohol for her and she should not be living alone anymore.     Son and patient's  will be in to see her later today.     Vangie Spear PA-C

## 2025-03-20 NOTE — CONSULTS
Consultation - Critical Care/ICU   Name: Romayne Geissenhainer 79 y.o. female I MRN: [id number]  Unit/Bed#: ICU 14 I Date of Admission: 3/18/2025   Date of Service: 3/20/2025 I Hospital Day: 1   Consults  Physician Requesting Evaluation: Rony Duran*   Reason for Evaluation / Principal Problem: Ventricular tachycardia         Assessment & Plan  Ventricular tachycardia (HCC)    Neuro/Psych:  Diagnoses: Pain/Agitation/Sedation; Hx of alcohol use   Plan:  Sedation: Fentanyl 50mcg/hr with propofol gtt titratable for RASS 0 to -1 at 30mcg currently    Monitor for signs/sx of alcohol withdrawl  Neuro checks q4    CV:  Diagnoses: Shock- cardiogenic vs septic (most likely); Ventricular Tachycardia- resolved; thoracic artery aneurysm (44mm)  3/19 TTEcho report:   Limited echocardiogram:   Moderate concentric left ventricular hypertrophy, normal left ventricular systolic function.  Grade 1 diastolic dysfunction.  Normal right ventricle size and systolic function.  Moderate left atrial and mild to moderate right atrial cavity enlargement.  Aortic valve leaflet sclerosis, mild aortic valve regurgitation.  Mitral valve annular calcification.  Thickened mitral valve leaflets with close of the fibrocalcific changes with subvalvular involvement.  No mitral valve stenosis.  Mild mitral valve regurgitation.  Mild tricuspid valve regurgitation.  No obvious pulmonary hypertension.  No significant pericardial effusion.  Suspected left-sided pleural effusion.  3/20 Troponin- 61  Plan:  Continue levo for MAP >65.  Currently levo was at 10 mcg a minute to maintain a MAP greater than 65  Previously patient was on levo of 30 mcg, epi at 6 and vaso 0.04  Continuous cardiopulmonary monitoring. Maintain MAP >65.  Check STAT labs and lytes  Check VBG off distal port of CVC to assess for cardiogenic vs septic shock  Continue amiodarone both in patient panel.    Hold p.o. amiodarone 200 3 times daily, started by cardiology on  3/19  Place patient on heparin drip ACS low, given critical illness.  Hold Eliquis  Trend troponins   Monitor lytes daily  Continue pravastatin 40 mg daily with dinner  Appreciate cardiology eval and recs  Discussed use of limited repeat echocardiogram with cardiology team  CT surgery consult on discharge     Pulm:  Diagnoses: Acute hypoxic and hypercapnic respiratory failure; L PNA; Pleural effusion; history of IVC filter  Imaging: 3/20 CXR-increased perihilar markings.  Left-sided pleural effusion  Intubated on 3/20  Vent: AC/VC 18/360/6/80%. Daily SAT/SBT. Wean FiO2 and PEEP as tolerated.   Plan:  Repeat CTA PE study   Repeat ABG after vent setting adjustment  Daily SAT/SBT  VAP bundle in place  Abx: CTX D2 currently  Patient lives at home. Will continue trmt for CAP   Obtain MRSA swab  Obtain strep/legionella AG and sputum culture  Chest wall oscillation   HTS nebulizers TID for 3 days   Monitor for signs/sx of infection   Continuous pulse oximetry. Maintain O2 sat >92%.   Discussed patient's past medical history and use of IVC filter further with family  Appreciate RT    GI:  Diagnoses: No acute issues  Plan:  Obtain CT A/P with contrast given undifferentiated shock   Will place OG pending CT A/P results  GI prophylaxis: Start Protonix 40 mg IV daily while n.p.o.    :  Diagnoses: Lactic acidosis, peak 3.3; Metabolic alkalosis  Creatinine trend: At baseline   Baseline creatinine: 0.2-0.4  Plan:  Give 1 L Isolyte bolus now  Obtain CT A/P with contrast to assess for end organ damage  Monitor endpoints   Monitor I/Os  Urinary retention protocol in place     F/E/N:  Diagnoses:   Plan:   F: 1 L Isolyte bolus now  E: Monitor and replete electrolytes for Mg >2, Phos >3, K >4.  N: NPO, f/u CT A/P  with contrast     Heme/Onc:  Diagnoses: Hx of breast cancer  Plan:  Okay to use L arm for Ivs given mastecotmy >10 years ago  VTE prophylaxis: Place patient on hepairn gtt ACS low     Endo:  Diagnoses: Hypothyroidism;  Hyperglycemia  Plan:   Continue home synthroid 50mcg daily   Monitor blood glucose on daily metabolic panel. Goal 140-180  Check TSH    ID:  Diagnoses: Septic shock 2/2 PNA with worsening leukocytosis and neutrophilia   Culture data: FLU/COVID/RSV negative., 3/18 BC- ngtd   Procal 0.84 (3/19)  Temperature: afebrile   Plan:  F/u new BC   Abx: CTX D2 currently  Patient lives at home. Will continue trmt for CAP   Obtain MRSA swab  Obtain strep/legionella AG and sputum culture  Monitor for signs/sx of infection   Monitor fever curve and WBC    MSK/Skin:  Diagnoses: No acute issues  Plan:  PT/OT when appropriate, not currently. Encourage OOB and ambulation when appropriate. Local wound care prn.    LDAs:  Lines - CVC, peripheral IV  Drains -   Airways -  ETT    Disposition: Critical care    History of Present Illness   Romayne Geissenhainer is a 79 y.o. female with PMH of HTN, hx of IVC filter which has since been removed, breast cancer s/p mastectomy in 2014, who presents to the ICU for respiratory failure and ventricular tachycardia. Patient initially presented to ED on 3/18 for shortness of breath/hoarse voice, CTA PE negative for PE, positive for small L sided pleural effusion, small b/l opacities in lower lobes, and cardiomegaly. She was admitted to [facility] on 3/18 for bronchitis versus pneumonia, she was started on ceftriaxone with a negative infectious workup.  She was found to be in A-fib RVR and was started on a Cardizem drip and heparin drip also on 3/18. On 3/19 she was evaluated by cardiology for her new onset paroxysmal A-fib and RVR and was started on amiodarone as well as Lopressor and the cardizem gtt was stopped. Patient was doing well throughout the day and ate dinner with no issues.  At approximately 10:30 PM she told bedside nurse that she was anxious and hyperventilating.  Bedside RN check vitals noting hypoxia to the 70s that did not improve with nonrebreather and a rapid response was called.     On my  arrival at the rapid response patient was ill-appearing, diaphoretic, tachypneic, and had notable accessory muscle usage.  She was mentating well and had no complaints.  I instructed RT to place patient on high flow nasal cannula and ordered an i-STAT, labs and an x-ray and reviewed telemetry during this.  I noted a 2 beat run of ventricular tachycardia at 9:44 PM.  Bedside EKG currently was NSR. I reported these findings to team and we immediately placed patient on pads.  I-STAT revealing significant respiratory acidosis, hypoxemia, normal lytes.  Patient was placed on BiPAP and bedside x-ray done revealing trace of pleural effusion.  Adequate minute ventilation on BiPAP noted.  Patient was transferred to the ICU with plans to repeat ABG in 15 minutes.    Shortly after arrival to the ICU, patient with increased work of breathing and decreased minute ventilation and patient became tachycardic.  SpO2 within normal limits.  Plans to intubate patient, however patient developed hypotension (blood pressure 50s over 20s) and ventricular tachycardia, she had a pulse the whole time.  She was given an amp of epi, calcium, bicarb as well as a 150 mg push of amiodarone.  She was started on levo and epi and she converted to atrial fibrillation.  She intermittently had bradycardic episodes as well as A-fib RVR episodes.  She was intubated and started on amiodarone bolus and infusion panel.  Metabolic workup and repeat imaging pending.  Multiple attempts were made to call family which she did not respond.    Per chart review and discussion with bedside RN, patient lives at home and is independent at baseline.  Plan was for her to be discharged on Friday after completing course of IV antibiotics for pneumonia.        History obtained from chart review and unobtainable from patient due to mental status.  Review of Systems: Review of Systems not obtainable due to Clinical Condition    Historical Information   Past Medical  History:  No date: Abnormal findings on diagnostic imaging of breast      Comment:  last assessed 3/19/14  No date: Anemia due to chemotherapy for breast cancer treated with   erythropoietin (HCC)      Comment:  last assessed 10/2/15  No date: Disease of thyroid gland  No date: Fibroadenoma of right breast  2014: History of chemotherapy  No date: Hx of radiation therapy      Comment:  last assessed 17  No date: Hypertension  No date: Hypokalemia      Comment:  last assessed 4/15/16  No date: Long term use of drug      Comment:  herceptin,last assessed 2020: Overweight  No date: Use of anastrozole (Arimidex) Past Surgical History:  2014: BREAST BIOPSY; Left      Comment:  IDC  2015: BREAST BIOPSY; Right      Comment:  FIBROADENOMA  No date:  SECTION      Comment:  X2  2014: IVC FILTER RETRIEVAL      Comment:  central iv line with subcutaneous reservoir mediaport  2014: LYMPHADENECTOMY; Left      Comment:  axxillary  2014: MASTECTOMY; Left  2014: PORTACATH PLACEMENT  2014: SENTINEL LYMPH NODE BIOPSY; Left      Comment:  AND  : TONSILLECTOMY  No date: TUBAL LIGATION   Current Outpatient Medications   Medication Instructions    anastrozole (ARIMIDEX) 1 mg tablet TAKE 1 TABLET BY MOUTH EVERY DAY    apixaban (ELIQUIS) 5 mg, Oral, 2 times daily    aspirin (ECOTRIN LOW STRENGTH) 81 mg, Daily    azelastine (ASTELIN) 0.1 % nasal spray 1 spray, Nasal, 2 times daily, Use in each nostril as directed    Calcium Carbonate-Vitamin D 600-400 MG-UNIT per tablet 2 tablets, Oral, Daily    guaiFENesin (MUCINEX) 600 mg, Oral, 2 times daily    hydroCHLOROthiazide 12.5 mg, Oral, Daily    levothyroxine 50 mcg, Oral, Daily    Multiple Vitamin (MULTIVITAMINS PO) Oral    pravastatin (PRAVACHOL) 40 mg tablet TAKE 1 TABLET BY MOUTH EVERY DAY    Allergies   Allergen Reactions    Mushroom Extract Complex - Food Allergy Hives      Social History     Tobacco Use    Smoking  status: Never     Passive exposure: Never    Smokeless tobacco: Never   Vaping Use    Vaping status: Never Used   Substance Use Topics    Alcohol use: Yes     Alcohol/week: 2.0 - 3.0 standard drinks of alcohol     Types: 2 - 3 Glasses of wine per week     Comment: social    Drug use: No    Family History   Problem Relation Age of Onset    Heart failure Mother     Coronary artery disease Mother     Diabetes Mother     No Known Problems Father     Breast cancer Sister 68    BRCA1 Negative Sister     No Known Problems Daughter     No Known Problems Maternal Grandmother     No Known Problems Maternal Grandfather     No Known Problems Paternal Grandmother     No Known Problems Paternal Grandfather     No Known Problems Maternal Aunt           Objective :                   Vitals I/O      Most Recent Min/Max in 24hrs   Temp (!) 97.2 °F (36.2 °C) Temp  Min: 96.8 °F (36 °C)  Max: 97.9 °F (36.6 °C)   Pulse 58 Pulse  Min: 58  Max: 122   Resp 18 Resp  Min: 17  Max: 30   /58 BP  Min: 43/35  Max: 176/77   O2 Sat 97 % SpO2  Min: 73 %  Max: 100 %      Intake/Output Summary (Last 24 hours) at 3/20/2025 0330  Last data filed at 3/20/2025 0200  Gross per 24 hour   Intake 3244.41 ml   Output 100 ml   Net 3144.41 ml       Diet NPO    Invasive Monitoring           Physical Exam   Physical Exam  Vitals and nursing note reviewed.   Eyes:      General: Vision grossly intact.      Extraocular Movements: Extraocular movements intact.      Conjunctiva/sclera: Conjunctivae normal.      Pupils: Pupils are equal, round, and reactive to light.   Skin:     General: Skin is warm.   HENT:      Head: Normocephalic and atraumatic.      Right Ear: Tympanic membrane normal.      Left Ear: Tympanic membrane normal.      Mouth/Throat:      Mouth: Mucous membranes are moist.   Neck:   no midline tenderness Cardiovascular:      Rate and Rhythm: Regular rhythm. Bradycardia present.      Heart sounds: Normal heart sounds.   Musculoskeletal:          General: Normal range of motion.      Right lower leg: No edema.      Left lower leg: No edema.      Comments: ROM full in UE and LE b/l    Abdominal: General: Bowel sounds are normal. There is no distension.      Palpations: Abdomen is soft.      Tenderness: There is no abdominal tenderness.   Constitutional:       General: She is not in acute distress.     Appearance: She is well-developed and well-nourished. She is not ill-appearing.      Interventions: She is sedated, intubated and restrained.   Pulmonary:      Effort: Pulmonary effort is normal. She is intubated.      Breath sounds: Examination of the left-upper field reveals decreased breath sounds. Examination of the left-middle field reveals decreased breath sounds. Decreased breath sounds present.   Psychiatric:         Mood and Affect: Mood and affect normal.   Neurological:      General: No focal deficit present.      Mental Status: She is alert and oriented to person, place and time. She is CAM ICU negative.      Cranial Nerves: No dysarthria or facial asymmetry.      Sensory: Sensation is intact.      Motor: gross motor function is at baseline for patient. Tremor and strength full and intact in all extremities. No weakness or seizure activity.      Comments: Tremulous and anxious when waking up off sedation           Diagnostic Studies        Lab Results: I have reviewed the following results:     Medications:  Scheduled PRN   [Held by provider] apixaban, 5 mg, BID  cefTRIAXone, 2,000 mg, Q24H  chlorhexidine, 15 mL, Q12H OSEAS  levothyroxine, 50 mcg, Early Morning  multi-electrolyte, 1,000 mL, Once  pantoprazole, 40 mg, Q24H OSEAS  pravastatin, 40 mg, Daily With Dinner  sodium chloride, 4 mL, TID  sodium phosphate, 12 mmol, Once      fentaNYL, 50 mcg, Q1H PRN       Continuous    amiodarone (CORDARONE) 900 mg in dextrose 5 % 500 mL infusion, 1 mg/min, Last Rate: 1 mg/min (03/19/25 0818)   Followed by  amiodarone (CORDARONE) 900 mg in dextrose 5 % 500 mL  infusion, 0.5 mg/min  fentaNYL, 50 mcg/hr, Last Rate: 50 mcg/hr (03/20/25 0005)  norepinephrine, 1-30 mcg/min, Last Rate: 6 mcg/min (03/20/25 0305)  propofol, 5-50 mcg/kg/min, Last Rate: 45 mcg/kg/min (03/20/25 0313)         Labs:   CBC    Recent Labs     03/19/25 0328 03/19/25 2241 03/19/25 2347 03/19/25 2352   WBC 12.53*  --  21.66*  --    HGB 13.5   < > 13.8 13.9   HCT 42.9   < > 45.2 41     --  254  --    BANDSPCT 7  --   --   --     < > = values in this interval not displayed.     BMP    Recent Labs     03/19/25 0328 03/19/25 2241 03/19/25 2347 03/19/25 2352   SODIUM 139  --  141  --    K 3.5  --  3.7  --    CL 98  --  99  --    CO2 33*   < > 34* 35*   AGAP 8  --  8  --    BUN 16  --  22  --    CREATININE 0.25*  --  0.49*  --    CALCIUM 8.8  --  9.7  --     < > = values in this interval not displayed.       Coags    Recent Labs     03/18/25 2004 03/19/25 0328 03/20/25 0052   INR 0.97  --  1.45*   PTT 29 34  --         Additional Electrolytes  Recent Labs     03/19/25 2347 03/19/25 2352   MG 2.9*  --    PHOS 2.7  --    CAIONIZED 1.09* 1.40*          Blood Gas    Recent Labs     03/19/25 2353   PHART 7.557*   BVI1TGZ 37.3   PO2ART 94.8   HEL0JWM 32.4*   BEART 9.6   SOURCE Radial, Right     Recent Labs     03/19/25 2353 03/20/25 0247   PHVEN  --  7.477*   ZRT0SLY  --  47.5   PO2VEN  --  31.0*   PUL1SBN  --  34.3*   BEVEN  --  9.5   C0TCNBU  --  70.3   SOURCE Radial, Right  --     LFTs  Recent Labs     03/18/25 0945 03/19/25  2347   ALT 15 31   AST 24 50*   ALKPHOS 65 83   ALB 4.1 3.5   TBILI 0.67 0.64       Infectious  Recent Labs     03/19/25  0328 03/19/25  2347   PROCALCITONI 0.13 0.84*     Glucose  Recent Labs     03/18/25  0945 03/18/25  1805 03/19/25  0328 03/19/25  2347   GLUC 106 114 101 188*        Administrative Statements

## 2025-03-20 NOTE — RAPID RESPONSE
Rapid Response Note  Romayne Geissenhainer 79 y.o. female MRN: [id number]  Unit/Bed#: E4 -01 Encounter: [id number]    Rapid Response Notification(s):   Response called date/time:  3/19/2025 10:34 PM  Response team arrival date/time:  3/19/2025 10:35 PM  Response end date/time:  3/19/2025 10:38 PM  Level of care:  [facility]  Rapid response location:  [facility] unit  Primary reason for rapid response call:  Acute change in O2 sat    Rapid Response Intervention(s):   Airway:  None  Breathing:  Oxygen (NRB, Bipap)  Circulation:  None  Fluids administered:  None  Medications administered:  Magnesium       Assessment:   NSVT  Respiratory acidosis  Hypoxemia   Hyperglycemia     Plan:   ISTAT with resp acidosis  EKG  CXR  Place on Bipap  Transfer to ICU on monitoring   See ICU acceptance note for full plan   Discussed with primary. Attempted to call family     Rapid Response Outcome:   Transfer:  Transfer to ICU  Primary service notified of transfer: Yes (present bedside)    Code Status: Level 1 (Full Code)      Family notified: Yes, Name of Family member contacted son Irineo didn't answer- left .  Dagoberto,  full.           Background/Situation:   Romayne Geissenhainer is a 79 y.o. female who is admitted for PNA who presents as an RRT for hypoxia. Patient ill appearing. On review of telemetry 2 beats of NSVT noted at approx 9:45pm.     Review of Systems   Constitutional:  Negative for chills and fever.   HENT:  Negative for ear pain and sore throat.    Eyes:  Negative for pain and visual disturbance.   Respiratory:  Positive for shortness of breath. Negative for cough, choking, chest tightness, wheezing and stridor.    Cardiovascular:  Negative for chest pain, palpitations and leg swelling.   Gastrointestinal:  Negative for abdominal pain, constipation, diarrhea, nausea and vomiting.   Genitourinary:  Negative for dysuria and hematuria.   Musculoskeletal:  Negative for arthralgias and back pain.   Skin:  Negative  for color change and rash.   Neurological:  Negative for seizures and syncope.   Psychiatric/Behavioral:  The patient is nervous/anxious.    All other systems reviewed and are negative.      Objective:   Vitals:    03/19/25 1404 03/19/25 1540 03/19/25 1853 03/19/25 2205   BP:  134/70 150/64 130/74   BP Location:  Right arm Right arm Right arm   Pulse:  78 82 78   Resp:  18 18 20   Temp:  (!) 96.8 °F (36 °C) 97.9 °F (36.6 °C) (!) 97.2 °F (36.2 °C)   TempSrc:  Temporal Temporal Temporal   SpO2: 93% 95% 94% (!) 73%   Weight:       Height:         Physical Exam  Vitals and nursing note reviewed.   Constitutional:       General: She is not in acute distress.     Appearance: She is well-developed. She is ill-appearing and diaphoretic.      Comments: Denies CP or pain overall   HENT:      Head: Normocephalic and atraumatic.   Eyes:      Conjunctiva/sclera: Conjunctivae normal.   Cardiovascular:      Rate and Rhythm: Normal rate and regular rhythm.      Heart sounds: No murmur heard.  Pulmonary:      Effort: Tachypnea, accessory muscle usage and prolonged expiration present. No respiratory distress or retractions.      Breath sounds: Examination of the left-upper field reveals decreased breath sounds. Examination of the left-middle field reveals decreased breath sounds. Decreased breath sounds present.      Comments: Decreased L breath sounds   Abdominal:      Palpations: Abdomen is soft.      Tenderness: There is no abdominal tenderness.   Musculoskeletal:         General: No swelling.      Cervical back: Neck supple.   Skin:     General: Skin is warm.      Capillary Refill: Capillary refill takes less than 2 seconds.   Neurological:      Mental Status: She is oriented to person, place, and time.      GCS: GCS eye subscore is 4. GCS verbal subscore is 5. GCS motor subscore is 6.      Cranial Nerves: Cranial nerves 2-12 are intact. No cranial nerve deficit, dysarthria or facial asymmetry.      Sensory: Sensation is intact.       Comments: A and O x3  Somnolent      Psychiatric:         Mood and Affect: Mood normal.

## 2025-03-20 NOTE — PROCEDURES
Central Line Insertion    Date/Time: 3/20/2025 12:01 AM    Performed by: Vangie Spear PA-C  Authorized by: Vangie Spear PA-C    Patient location:  ICU and bedside  Other Assisting Provider: Yes (comment)    Consent:     Consent obtained:  Emergent situation  Universal protocol:     Relevant documents present and verified: yes      Test results available and properly labeled: yes      Radiology Images displayed and confirmed.  If images not available, report reviewed: yes      Required blood products, implants, devices, and special equipment available: yes      Site/side marked: yes      Immediately prior to procedure, a time out was called: yes      Patient identity confirmed:  Anonymous protocol, patient vented/unresponsive  Pre-procedure details:     Hand hygiene: Hand hygiene performed prior to insertion      Sterile barrier technique: All elements of maximal sterile technique followed      Skin preparation:  ChloraPrep  Indications:     Central line indications: medications requiring central line    Anesthesia (see MAR for exact dosages):     Anesthesia method:  Local infiltration    Local anesthetic:  Lidocaine 1% w/o epi  Procedure details:     Location:  Right internal jugular    Vessel type: vein      Laterality:  Right    Approach: percutaneous technique used      Patient position:  Flat    Catheter type:  Triple lumen 16cm    Catheter size:  7 Fr    Landmarks identified: yes      Ultrasound guidance: yes      Ultrasound image availability:  Still images obtained (saved on phone due to urgency)    Sterile ultrasound techniques: Sterile gel and sterile probe covers were used      Manometry confirmation: yes      Number of attempts:  1    Successful placement: yes    Post-procedure details:     Post-procedure:  Dressing applied and line sutured    Assessment:  Blood return through all ports, free fluid flow, placement verified by x-ray and no pneumothorax on x-ray    Post-procedure complications:  none      Patient tolerance of procedure:  Tolerated well, no immediate complications

## 2025-03-20 NOTE — PHYSICAL THERAPY NOTE
PHYSICAL THERAPY NOTE          Patient Name: Romayne Geissenhainer  Today's Date: 3/20/2025       03/20/25 0823   PT Last Visit   PT Visit Date 03/20/25   Note Type   Note type Cancelled Session   Cancel Reasons Intubated/sedated   Additional Comments PT consult received. Chart reviewed.  Pt transferred to ICU and now intubated and sedated.  Will monitor and complete evaluation as medical status improves.     Simi Tyson, PT

## 2025-03-20 NOTE — ASSESSMENT & PLAN NOTE
Patient has known hypothyroidism and is on Synthroid therapy.  She will need close monitoring for amiodarone related worsening hypothyroidism or precipitating hyperthyroid state.

## 2025-03-20 NOTE — ASSESSMENT & PLAN NOTE
Acute hypoxic and hypercapnic respiratory failure 2/2 B/L PNA;   Imaging: 3/20 CXR-increased perihilar markings.  Left-sided pleural effusion  Intubated on 3/20 extubated on 3/20  Currently patient is on BiPAP for acute hypoxic and hypercapnic respiratory failure because of pneumonia.    Plan:  Repeat CTA PE study   Repeat ABG after vent setting adjustment  Daily SAT/SBT  VAP bundle in place  Abx: CTX D2 currently  Patient lives at home. Will continue trmt for CAP   Obtain MRSA swab  Obtain strep/legionella AG and sputum culture  Chest wall oscillation   HTS nebulizers TID for 3 days   Monitor for signs/sx of infection   Continuous pulse oximetry. Maintain O2 sat >92%.   Discussed patient's past medical history and use of IVC filter further with family  Appreciate RT

## 2025-03-20 NOTE — ASSESSMENT & PLAN NOTE
Possible sepsis secondary to pneumonia.  Patient slightly better compared to last night estimation.  Continue respiratory care and antibiotic therapy.

## 2025-03-20 NOTE — ASSESSMENT & PLAN NOTE
3/19 TTEcho report:   Limited echocardiogram:   Moderate concentric left ventricular hypertrophy, normal left ventricular systolic function.  Grade 1 diastolic dysfunction.  Normal right ventricle size and systolic function.  Moderate left atrial and mild to moderate right atrial cavity enlargement.  Aortic valve leaflet sclerosis, mild aortic valve regurgitation.  Mitral valve annular calcification.  Thickened mitral valve leaflets with close of the fibrocalcific changes with subvalvular involvement.  No mitral valve stenosis.  Mild mitral valve regurgitation.  Mild tricuspid valve regurgitation.  No obvious pulmonary hypertension.  No significant pericardial effusion.  Suspected left-sided pleural effusion.  3/20 Troponin- 61    Plan:  Continue levo for MAP >65.  Currently levo was at 10 mcg a minute to maintain a MAP greater than 65  Previously patient was on levo of 30 mcg, epi at 6 and vaso 0.04  Continuous cardiopulmonary monitoring. Maintain MAP >65.  Check STAT labs and lytes  Continue amiodarone both in patient panel.    Hold p.o. amiodarone 200 3 times daily, started by cardiology on 3/19  Place patient on heparin drip ACS low, given critical illness.  Hold Eliquis  Trend troponins   Monitor lytes daily  Continue pravastatin 40 mg daily with dinner  Appreciate cardiology eval and recs  Discussed use of limited repeat echocardiogram with cardiology team

## 2025-03-21 PROBLEM — I48.91 A-FIB (HCC): Status: ACTIVE | Noted: 2025-01-01

## 2025-03-21 PROBLEM — I48.0 PAROXYSMAL ATRIAL FIBRILLATION WITH RAPID VENTRICULAR RESPONSE (HCC): Status: ACTIVE | Noted: 2025-01-01

## 2025-03-21 NOTE — DISCHARGE SUMMARY
Admission Date: 3/18/2025   Admitting Diagnosis: Dyspnea [R06.00]  SOB (shortness of breath) [R06.02]  Hypertension [I10]  Atrial fibrillation with RVR ([facility]) [I48.91]  Discharge Date: 03/21/25    HPI: Romayne Geissenhainer is a 79 y.o. female with a PMH of breast cancer, hypertension, hypothyroidism who presents with shortness of breath.  She was in the ER earlier today and underwent a workup.  She had chest x-ray which showed no abnormality.  She had a D-dimer which adjusted for age ruled out PE.  She was sent home and came back this afternoon due to persistent shortness of breath.  While in the emergency department she developed rapid atrial fibrillation.   This initially improved with vagal maneuvers.  However she also required IV Cardizem 10 mg x 1.  The patient starting having shortness of breath on minimal exertion over the weekend.  She denied cough or sputum production then but today she noticed that she feels there is a lot of phlegm that she could not expectorate.  Additional information was obtained from her son and niece.  They told me that she has lost approximately 60 pounds in 1 year.  She has been taking care of her disabled  and has not been taking care of herself in the process.  She has episodes of anxiety.  According to her son she has complained of occasional fluttering in her chest over the years.    Procedures Performed:   Orders Placed This Encounter   Procedures    Central Line    Intubation    Intubation       Summary of Hospital Course:   Assessment & Plan  Hypothyroidism  Hold home levo because of BiPAP   The most recent TSH unremarkable  Septic shock ([facility])  Diagnoses: Septic shock 2/2 PNA with worsening leukocytosis and neutrophilia   Culture data: FLU/COVID/RSV negative., 3/18 BC- ngtd   Procal 0.84 (3/19)  Temperature: afebrile   Plan:  F/u new BC   Abx: CTX D2 currently  Patient lives at home. Will continue trmt for CAP   Obtain MRSA swab  Legionella and Streptococcus pneumonia  unremarkable  Follow-up with blood culture, sputum culture, MRSA nares  Severe CAP But drip score is low  Monitor for signs/sx of infection   Monitor fever curve and WBC  Pneumonia  Diagnoses: Septic shock 2/2 PNA  Culture data: FLU/COVID/RSV negative.  Procal 0.84 (3/19)  Temperature: afebrile   Plan:  Continue MV as above   F/u new BC   Abx: CTX D2 currently  Patient lives at home. Will continue trmt for CAP   Obtain MRSA swab  Obtain strep/legionella AG and sputum culture  Monitor for signs/sx of infection   Monitor fever curve and WBC  Acute hypoxic respiratory failure (HCC)  Acute hypoxic and hypercapnic respiratory failure 2/2 B/L PNA;   Imaging: 3/20 CXR-increased perihilar markings.  Left-sided pleural effusion  Intubated on 3/20 extubated on 3/20  Currently patient is on BiPAP for acute hypoxic and hypercapnic respiratory failure because of pneumonia.    Plan:  Repeat CTA PE study   Repeat ABG after vent setting adjustment  Daily SAT/SBT  VAP bundle in place  Abx: CTX D2 currently  Patient lives at home. Will continue trmt for CAP   Obtain MRSA swab  Obtain strep/legionella AG and sputum culture  Chest wall oscillation   HTS nebulizers TID for 3 days   Monitor for signs/sx of infection   Continuous pulse oximetry. Maintain O2 sat >92%.   Discussed patient's past medical history and use of IVC filter further with family  Appreciate RT  Aneurysm of ascending aorta without rupture (HCC)  Repeat CT scan after 1 year  Paroxysmal atrial fibrillation with rapid ventricular response (HCC)  Paroxysmal afib with rvr  Most recent Tsh is wnl  Most likely reason for A-fib with septic shock in the background of pneumonia    Plan:  Amio drip because of n.p.o.  Hold metoprolol in the background of low blood pressure  Heparin ACS drip because of n.p.o.        Significant Findings, Care, Treatment and Services Provided: Acute hypoxic and chronic hypercapnic respiratory failure    Complications: Acute hypoxic and chronic  hypercapnic respiratory failure    Disposition:      Final Diagnosis:     Medical Problems       Resolved Problems  Date Reviewed: 3/19/2025          Resolved    Hypertension 3/20/2025     Resolved by  Vangie Spear PA-C    Sepsis (HCC) 3/20/2025     Resolved by  Vangie Spear PA-C    * (Principal) Paroxysmal atrial fibrillation with rapid ventricular response (HCC) 3/20/2025     Resolved by  Satya Nicholas MD          Condition at Time of Death: Acute respiratory failure, cardiac arrest, not responding to commands, AMS  Date, Time and Cause of Death    Date of Death: 3/21/25  Time of Death:  1:30 PM  Preliminary Cause of Death: Acute respiratory failure  Entered by: Mayra Smith MD[DG1.1]       Attribution       DG1.1 Mayra Smith MD 25 14:41            Death Note:  INPATIENT DEATH NOTE  Romayne Geissenhainer 79 y.o. female MRN: [id number]  Unit/Bed#: ICU 14 Encounter: [id number]    Date, Time and Cause of Death    Date of Death: 3/21/25  Time of Death:  1:30 PM  Preliminary Cause of Death: Acute respiratory failure  Entered by: Mayra Smith MD[DG1.1]       Attribution       DG1.1 Mayra Smith MD 25 14:41             Patient's Information  Pronounced by: Mayra Smith MD  Did the patient's death occur in the ED?: No  Did the patient's death occur in the OR?: No  Did the patient's death occur less than 10 days post-op?: No  Did the patient's death occur within 24 hours of admission?: No  Was code status DNR at the time of death?: Yes    PHYSICAL EXAM:  Unresponsive to noxious stimuli, Spontaneous respirations absent, Breath sounds absent, Carotid pulse absent, Heart sounds absent, Pupillary light reflex absent, and Corneal blink reflex absent    Medical Examiner notification criteria:  NONE APPLICABLE   Medical Examiner's office notified?:  No, does not meet ME notification criteria   Medical Examiner accepted case?:  No  Name of Medical Examiner:  N/A    Family Notification  Was the family notified?: Yes  Date Notified: 03/21/25  Time Notified: 1342  Notified by:  and   Name of Family Notified of Death: Arias   Relationship to Patient: Son  Family Notification Route: Telephone    Autopsy Options:  Decision for post-mortem examination not yet made by next of kin.    Primary Service Attending Physician notified?:  yes - Attending:  Rony Duran*    Physician/Resident responsible for completing Discharge Summary:  Mayra Smith

## 2025-03-21 NOTE — ASSESSMENT & PLAN NOTE
Noted to go in and out of atrial fibrillation.  Currently sinus rhythm with heart rate in 60s to 70s.  Is back on amiodarone IV therapy.  Is hypotensive and on norepinephrine at 7 mcg/min.  He is anticoagulated with Lovenox therapeutic dose subcutaneous.  Clinically does not appear to be in volume overloaded state.  Will continue current regimen.  Can get dose of digoxin 120 manage milligrams x 1 if she has sustained A-fib with RVR.  Will transition back to oral medications once she is able to take them.

## 2025-03-21 NOTE — PROGRESS NOTES
Interval Progress Note - Critical Care   Romayne Geissenhainer 79 y.o. female MRN: [id number]  Unit/Bed#: ICU 14 Encounter: [id number]    Called emergently to the bedside while in the ICU.  Respiratory therapy having difficulty with patient's oxygen saturations and noted to have significant mucous plugging.  Attempted NT suctioning was unsuccessful in passing the catheter through the nares.      Decision was rapidly made to intubate due to the patient's color change, persistent saturations in the low to mid 80s and significant respiratory distress.  Reliable saturations were also unable to be obtained due to the patient's poor peripheral pulse signal.    Initial attempts by Ed Marco with #8 tube using glide scope unsuccessful, direct laryngoscopy unsuccessful.  Patient's airway was extremely anterior and difficult to visualize.  I did successfully place the endotracheal tube but with significant difficulty.  Color change confirmed and bilateral breath sounds confirmed.    Unfortunately, the patient's pulse deteriorated during the intubation efforts.  During the bradycardia, half dose of epinephrine was administered.  She did not respond to this and continued to become more agonal and pulseless.  Patient was a level 2 DNR and therefore no further resuscitation efforts.  No CPR.    Patient was pronounced at the bedside.  I did notify the patient's emergency contact, son Arias.  Offered to answer any questions that I could.  He expressed the desire to collect his thoughts and may return a call later to discuss with nursing or Dr. Duran as appropriate    Critical care time for management of the patient excluding the procedural time for intubation and excluding family update was 34 minutes    Hema Schneider MD

## 2025-03-21 NOTE — PROCEDURES
Intubation    Date/Time: 3/21/2025 1:56 PM    Performed by: Hema Schneider MD  Authorized by: Mayra Smith MD    Other Assisting Provider: Yes (comment) (Vincent Lara)    Consent:     Consent obtained:  Emergent situation  Universal protocol:     Patient identity confirmed:  Arm band  Pre-procedure details:     Patient status:  Awake (agonal respiration and cyanotic. respiratory distress)    Pretreatment medications:  Etomidate    Paralytics:  None  Indications:     Indications for intubation: respiratory distress and hypoxemia    Procedure details:     Preoxygenation:  Bag valve mask (Saturations did not improve with BVM)    Intubation method:  Oral    Oral intubation technique:  Glidescope    Tube size (mm):  7.5    Tube type:  Cuffed    Number of attempts:  3 or more (2 attempts by KIMBERLY Lara. 1 glidescope, 1 direct laryngoscopy. 2 attempts by me 1 direct laryngoscopy, successful on my 2nd attempt with glidescope)    Ventilation between attempts: yes      Cricoid pressure: yes      Tube visualized through cords: yes    Placement assessment:     Breath sounds:  Equal    Placement verification: chest rise, condensation and ETCO2 detector    Post-procedure details:     Complication (if applicable):  Morro to asystolic arrest - DNR level 2, resuscitation/CPR not performed  Comments:      Patient had difficult airway. Anterior and difficult to angle tube to airway despite cricoid pressure and direct visualization of cords with glidescope

## 2025-03-21 NOTE — ASSESSMENT & PLAN NOTE
Possible pneumonia with mucous plugging in the region.  Appreciate critical care team evaluation and management.

## 2025-03-21 NOTE — DEATH NOTE
INPATIENT DEATH NOTE  Romayne Geissenhainer 79 y.o. female MRN: [id number]  Unit/Bed#: ICU 14 Encounter: [id number]    Date, Time and Cause of Death    Date of Death: 3/21/25  Time of Death:  1:30 PM  Preliminary Cause of Death: Acute respiratory failure  Entered by: Mayra Smith MD[DG1.1]       Attribution       DG1.1 Mayra Smith MD 25 14:41             Patient's Information  Pronounced by: Mayra Smith MD  Did the patient's death occur in the ED?: No  Did the patient's death occur in the OR?: No  Did the patient's death occur less than 10 days post-op?: No  Did the patient's death occur within 24 hours of admission?: No  Was code status DNR at the time of death?: Yes    PHYSICAL EXAM:  Unresponsive to noxious stimuli, Spontaneous respirations absent, Breath sounds absent, Carotid pulse absent, Heart sounds absent, Pupillary light reflex absent, and Corneal blink reflex absent    Medical Examiner notification criteria:  NONE APPLICABLE   Medical Examiner's office notified?:  No, does not meet ME notification criteria   Medical Examiner accepted case?:  No  Name of Medical Examiner: N/A    Family Notification  Was the family notified?: Yes  Date Notified: 25  Time Notified:   Notified by:  and   Name of Family Notified of Death: Arias   Relationship to Patient: Son  Family Notification Route: Telephone    Autopsy Options:  Decision for post-mortem examination not yet made by next of kin.    Primary Service Attending Physician notified?:  yes - Attending:  Rony Duran*    Physician/Resident responsible for completing Discharge Summary:  Mayra Smith

## 2025-03-21 NOTE — CASE MANAGEMENT
Case Management Discharge Planning Note    Patient name Romayne Geissenhainer  Location ICU 14/ICU 14 MRN [id number]  : 1945 Date 3/21/2025       Current Admission Date: 3/18/2025  Current Admission Diagnosis:Hypothyroidism   Patient Active Problem List    Diagnosis Date Noted Date Diagnosed    Paroxysmal atrial fibrillation with rapid ventricular response (HCC) 2025     Septic shock (HCC) 2025     Pneumonia 2025     Acute hypoxic respiratory failure (HCC) 2025     Aneurysm of ascending aorta without rupture (HCC) 2025     Essential (hemorrhagic) thrombocythemia (HCC) 2022     Aspirin long-term use 2020     Osteopenia 2019     Diverticulosis 2019     Occult blood in stools 2019     Myalgia 2018     Chronic pain of both knees 2018     History of breast cancer      Menopause 2017     Hyperglycemia 2014     Hyperlipidemia 2012     Hypothyroidism 2012       LOS (days): 2  Geometric Mean LOS (GMLOS) (days): 4.9  Days to GMLOS:2.8     OBJECTIVE:  Risk of Unplanned Readmission Score: 14.23         Current admission status: Inpatient   Preferred Pharmacy:   [facility]/pharmacy #[id number] - [place] - [address] [place] [address]  JEANE FISHER [id number]  Phone: [phone] Fax: [phone]    Primary Care Provider: Dejan Trevino MD    Primary Insurance: MEDICARE  Secondary Insurance: [facility] [employer] PROGRAM    DISCHARGE DETAILS:        Additional Comments: CM consult closed, the patient is

## 2025-03-21 NOTE — ASSESSMENT & PLAN NOTE
Paroxysmal afib with rvr  Most recent Tsh is wnl  Most likely reason for A-fib with septic shock in the background of pneumonia    Plan:  Amio drip because of n.p.o.  Hold metoprolol in the background of low blood pressure  Heparin ACS drip because of n.p.o.

## 2025-03-21 NOTE — ED PROVIDER NOTES
Time reflects when diagnosis was documented in both MDM as applicable and the Disposition within this note       Time User Action Codes Description Comment    3/18/2025  6:16 PM Chinmay Griffiths Add [R06.00] Dyspnea     3/18/2025  6:16 PM Chinmay Griffiths Add [I10] Hypertension     3/18/2025  7:50 PM Randell Joya Add [I48.91] Atrial fibrillation with RVR (HCC)     3/20/2025  2:14 AM RainerWilliams molinana Modify [R06.00] Dyspnea     3/20/2025  2:14 AM Rainer Vangie Add [R57.9] Shock (HCC)           ED Disposition       ED Disposition   Admit    Condition   Stable    Date/Time   Tue Mar 18, 2025  5:22 PM    Comment   --             Assessment & Plan       Medical Decision Making  Shortness of breath likely secondary to COPD.  Patient had negative workup for pneumonia, congestive heart failure, dysrhythmia, ACS, pulmonary embolism earlier today.  Case was discussed with Dr. Joya of [facility] who accepted patient prior to lab work being returned.  Will do cardiac workup, DuoNeb, admit    Amount and/or Complexity of Data Reviewed  Labs: ordered.    Risk  Prescription drug management.  Decision regarding hospitalization.             Medications   ipratropium-albuterol (FOR EMS ONLY) (DUO-NEB) 0.5-2.5 mg/3 mL inhalation solution 6 mL (0 mL Does not apply Given to EMS 3/18/25 1729)   ipratropium-albuterol (DUO-NEB) 0.5-2.5 mg/3 mL inhalation solution 3 mL (3 mL Nebulization Given 3/18/25 1737)   diltiazem (CARDIZEM) injection 15 mg (15 mg Intravenous Given 3/18/25 1941)   iohexol (OMNIPAQUE) 350 MG/ML injection (MULTI-DOSE) 85 mL (85 mL Intravenous Given 3/18/25 2300)       ED Risk Strat Scores            [id number] SCORE      Flowsheet Row Most Recent Value   [id number]    Age 2 Filed at: 03/18/2025 1946   Sex 1 Filed at: 03/18/2025 1946   CHF History 0 Filed at: 03/18/2025 1946   HTN History 1 Filed at: 03/18/2025 1946   Stroke or TIA Symptoms Previously 0 Filed at: 03/18/2025 1946   Vascular Disease History 0 Filed at:  2025   Diabetes History 0 Filed at: 2025   [id number] Score 4 Filed at: 2025                                            History of Present Illness       Chief Complaint   Patient presents with    Shortness of Breath     SOB, EMS reported room air sat in the 80's. 2 duo nebs PTA       Past Medical History:   Diagnosis Date    Abnormal findings on diagnostic imaging of breast     last assessed 3/19/14    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 (HCC)     last assessed 10/2/15    Disease of thyroid gland     Fibroadenoma of right breast     History of chemotherapy     Hx of radiation therapy     last assessed 17    Hypertension     Hypokalemia     last assessed 4/15/16    Long term use of drug     herceptin,last assessed 17    Overweight 2020    Use of anastrozole (Arimidex)       Past Surgical History:   Procedure Laterality Date    BREAST BIOPSY Left 2014    IDC    BREAST BIOPSY Right 2015    FIBROADENOMA     SECTION      X2    IVC FILTER RETRIEVAL  2014    central iv line with subcutaneous reservoir mediaport    LYMPHADENECTOMY Left 2014    axxillary    MASTECTOMY Left 2014    PORTACATH PLACEMENT  2014    SENTINEL LYMPH NODE BIOPSY Left 2014    AND    TONSILLECTOMY      TUBAL LIGATION        Family History   Problem Relation Age of Onset    Heart failure Mother     Coronary artery disease Mother     Diabetes Mother     No Known Problems Father     Breast cancer Sister 68    BRCA1 Negative Sister     No Known Problems Daughter     No Known Problems Maternal Grandmother     No Known Problems Maternal Grandfather     No Known Problems Paternal Grandmother     No Known Problems Paternal Grandfather     No Known Problems Maternal Aunt       Social History     Tobacco Use    Smoking status: Never     Passive exposure: Never    Smokeless tobacco: Never   Vaping Use    Vaping status:  Never Used   Substance Use Topics    Alcohol use: Yes     Alcohol/week: 2.0 - 3.0 standard drinks of alcohol     Types: 2 - 3 Glasses of wine per week     Comment: social    Drug use: No      E-Cigarette/Vaping    E-Cigarette Use Never User       E-Cigarette/Vaping Substances    Nicotine No     THC No     CBD No     Flavoring No     Other No     Unknown No       I have reviewed and agree with the history as documented.     79-year-old female who was seen earlier today by myself for shortness of breath presents for reevaluation of shortness of breath.  Patient was seen this morning with a negative cardiac workup, including age-adjusted D-dimer.  Patient states while she was at home she had sudden onset of shortness of breath again prompting her to seek medical attention.  Patient was given breathing treatment en route which she states has helped her symptoms.      History provided by:  Patient and medical records  Shortness of Breath  Associated symptoms: no abdominal pain, no chest pain, no ear pain, no fever, no rash, no sore throat, no vomiting and no wheezing        Review of Systems   Constitutional:  Negative for activity change, appetite change, fatigue and fever.   HENT:  Negative for congestion, dental problem, ear pain, rhinorrhea and sore throat.    Eyes:  Negative for pain and redness.   Respiratory:  Positive for shortness of breath. Negative for chest tightness and wheezing.    Cardiovascular:  Negative for chest pain and palpitations.   Gastrointestinal:  Negative for abdominal pain, blood in stool, constipation, diarrhea, nausea and vomiting.   Endocrine: Negative for cold intolerance and heat intolerance.   Genitourinary:  Negative for dysuria, frequency and hematuria.   Musculoskeletal:  Negative for arthralgias and myalgias.   Skin:  Negative for color change, pallor and rash.   Neurological:  Negative for weakness and numbness.   Hematological:  Does not bruise/bleed easily.    Psychiatric/Behavioral:  Negative for agitation, hallucinations and suicidal ideas.            Objective       ED Triage Vitals   Temperature Pulse Blood Pressure Respirations SpO2 Patient Position - Orthostatic VS   03/18/25 1707 03/18/25 1707 03/18/25 1707 03/18/25 1707 03/18/25 1707 03/18/25 1707   98.4 °F (36.9 °C) 81 142/60 22 95 % Sitting      Temp Source Heart Rate Source BP Location FiO2 (%) Pain Score    03/18/25 1707 03/18/25 1707 03/18/25 1707 03/20/25 0400 03/19/25 0000    Oral Monitor Right arm 70 No Pain      Vitals      Date and Time Temp Pulse SpO2 Resp BP Pain Score FACES Pain Rating User   03/20/25 2002 -- -- 96 % -- -- -- --    03/20/25 1900 97.8 °F (36.6 °C) -- -- -- -- -- -- TG   03/20/25 1800 -- 64 97 % 29 124/49 -- -- MD   03/20/25 1700 -- 62 96 % 28 111/48 -- -- MD   03/20/25 1600 -- 72 92 % 38 131/69 No Pain -- MD   03/20/25 1530 -- 72 94 % 32 121/53 -- -- MD   03/20/25 1500 -- 66 96 % 28 108/67 -- -- MD   03/20/25 1428 97.8 °F (36.6 °C) 66 -- 32 112/63 -- -- MD   03/20/25 1428 -- -- 95 % -- -- -- -- JUANI   03/20/25 1400 -- 68 95 % 32 108/52 -- -- MD   03/20/25 1340 -- -- 99 % -- -- -- -- JUANI   03/20/25 1316 -- -- 94 % -- -- -- -- JUANI   03/20/25 1300 -- 66 96 % 38 106/48 -- -- MD   03/20/25 1232 -- -- 92 % -- -- -- -- JUANI   03/20/25 1230 -- 78 87 % 36 107/53 -- -- MD   03/20/25 1221 -- -- 93 % -- -- -- -- RK   03/20/25 1215 -- 72 98 % 37 135/62 -- -- MD   03/20/25 1006 -- -- 99 % -- -- -- -- RK   03/20/25 0845 -- 94 99 % 29 104/58 -- -- MD   03/20/25 0822 -- -- 99 % -- -- -- -- RK   03/20/25 0801 -- -- 99 % -- -- -- -- RK   03/20/25 0757 -- -- 99 % -- -- -- -- RK   03/20/25 0743 97 °F (36.1 °C) -- -- -- -- -- -- JH   03/20/25 0700 -- 58 99 % 12 102/42 -- -- BL   03/20/25 0659 -- -- -- -- -- Med Not Given for Pain - for MAR use only -- ME   03/20/25 0625 -- 58 99 % 11 91/42 -- -- ME   03/20/25 0555 -- 60 99 % 12 96/39 -- -- ME   03/20/25 0540 -- 58 100 % 11 96/47 -- -- ME   03/20/25 0525 -- 58  100 % 12 88/42 -- -- ME   03/20/25 0455 -- 54 100 % 11 81/39 -- -- ME   03/20/25 0430 -- 54 100 % 31 111/45 -- -- ME   03/20/25 0400 -- 58 99 % 24 118/46 -- -- ME   03/20/25 0348 97.1 °F (36.2 °C) -- -- -- -- -- -- QB   03/20/25 0315 -- 58 97 % 18 151/58 -- -- ME   03/20/25 0245 -- 60 99 % 17 143/63 -- -- ME   03/20/25 0200 -- 60 99 % 17 127/48 -- -- ME   03/20/25 0145 -- 60 100 % 21 121/52 -- -- ME   03/20/25 0130 -- 64 100 % 18 127/56 -- -- ME   03/20/25 0115 -- 64 100 % 18 101/53 -- -- ME   03/20/25 0100 -- 62 100 % 18 119/46 -- -- ME   03/20/25 0050 -- 62 99 % 17 113/43 -- -- ME   03/20/25 0040 -- 72 98 % 18 124/43 -- -- ME   03/20/25 0035 -- 64 98 % 18 116/78 -- -- ME   03/20/25 0030 -- 62 96 % 24 97/43 -- -- ME   03/20/25 0025 -- 60 95 % 26 126/48 -- -- ME   03/20/25 0020 -- 58 96 % 30 107/50 -- -- ME   03/20/25 0015 -- 60 96 % 28 118/52 -- -- ME   03/20/25 0010 -- 58 97 % 18 131/54 -- -- ME   03/20/25 0005 -- 64 98 % 18 161/55 Med Not Given for Pain - for MAR use only -- ME   03/20/25 0000 -- 72 99 % 20 176/77 -- -- ME   03/19/25 2350 -- 74 98 % 22 176/69 Med Not Given for Pain - for MAR use only -- ME   03/19/25 2345 -- 122 98 % 22 121/60 Med Not Given for Pain - for MAR use only -- ME   03/19/25 2340 -- 94 95 % 22 43/35 -- -- ME   03/19/25 2334 -- -- 97 % -- -- -- -- MW   03/19/25 2205 97.2 °F (36.2 °C) 78 73 % 20 130/74 -- -- MK   03/19/25 2000 -- -- -- -- -- No Pain -- OS   03/19/25 1853 97.9 °F (36.6 °C) 82 94 % 18 150/64 -- -- MK   03/19/25 1540 96.8 °F (36 °C) 78 95 % 18 134/70 -- -- MK   03/19/25 1404 -- -- 93 % -- -- -- -- BSG   03/19/25 1330 -- 80 -- -- 144/73 -- -- JM   03/19/25 1057 97.9 °F (36.6 °C) 78 93 % 18 144/73 -- -- TP   03/19/25 1033 -- -- -- -- -- No Pain -- AK   03/19/25 0800 -- -- -- -- -- No Pain -- CR   03/19/25 0736 97.6 °F (36.4 °C) 81 93 % 18 121/58 -- -- TP   03/19/25 0309 99.5 °F (37.5 °C) 76 91 % 18 119/64 -- -- DC   03/19/25 0000 -- -- -- -- -- No Pain -- OS   03/19/25 0000  99.2 °F (37.3 °C) 90 90 % 18 167/70 -- -- DC   03/18/25 2329 -- -- 98 % 18 129/61 -- -- AJ   03/18/25 2314 -- 80 99 % 18 133/58 -- -- AJ   03/18/25 2259 -- 90 98 % 18 133/58 -- --    03/18/25 2244 -- 98 99 % 18 112/74 -- --    03/18/25 2215 --  164 SLIM made aware 98 % 23 114/78 -- --    03/18/25 1922 -- 94 98 % 22 101/57 -- -- AW   03/18/25 1715 -- 82 94 % 22 142/60 -- -- AW   03/18/25 1707 98.4 °F (36.9 °C) 81 95 % 22 142/60 -- -- AW            Physical Exam  Constitutional:       Appearance: She is well-developed.   HENT:      Head: Normocephalic and atraumatic.   Eyes:      Pupils: Pupils are equal, round, and reactive to light.   Neck:      Vascular: No JVD.      Trachea: No tracheal deviation.   Cardiovascular:      Rate and Rhythm: Normal rate and regular rhythm.   Pulmonary:      Effort: No tachypnea, accessory muscle usage or respiratory distress.      Breath sounds: Examination of the right-upper field reveals decreased breath sounds and wheezing. Examination of the left-upper field reveals decreased breath sounds and wheezing. Examination of the right-middle field reveals decreased breath sounds and wheezing. Examination of the left-middle field reveals decreased breath sounds and wheezing. Examination of the right-lower field reveals decreased breath sounds and wheezing. Examination of the left-lower field reveals decreased breath sounds and wheezing. Decreased breath sounds and wheezing present.   Abdominal:      General: There is no distension.   Musculoskeletal:      Right lower leg: Normal.      Left lower leg: Normal.   Skin:     General: Skin is warm.      Capillary Refill: Capillary refill takes less than 2 seconds.   Neurological:      Mental Status: She is alert and oriented to person, place, and time.   Psychiatric:         Behavior: Behavior normal.         Results Reviewed       Procedure Component Value Units Date/Time    Blood culture [[id number]] Collected: 03/18/25 2004    Lab  Status: Preliminary result Specimen: Blood from Hand, Right Updated: 03/19/25 2301     Blood Culture No Growth at 24 hrs.    Blood culture [[id number]] Collected: 03/18/25 2023    Lab Status: Preliminary result Specimen: Blood from Arm, Right Updated: 03/19/25 2301     Blood Culture No Growth at 24 hrs.    HS Troponin I 4hr [[id number]]  (Normal) Collected: 03/18/25 2243    Lab Status: Final result Specimen: Blood from Arm, Right Updated: 03/18/25 2309     hs TnI 4hr 19 ng/L      Delta 4hr hsTnI 6 ng/L     HS Troponin I 2hr [[id number]]  (Normal) Collected: 03/18/25 2042    Lab Status: Final result Specimen: Blood from Arm, Right Updated: 03/18/25 2112     hs TnI 2hr 11 ng/L      Delta 2hr hsTnI -2 ng/L     Procalcitonin [[id number]]  (Normal) Collected: 03/18/25 2004    Lab Status: Final result Specimen: Blood from Arm, Right Updated: 03/18/25 2059     Procalcitonin 0.07 ng/ml     APTT [[id number]]  (Normal) Collected: 03/18/25 2004    Lab Status: Final result Specimen: Blood from Arm, Right Updated: 03/18/25 2047     PTT 29 seconds     Protime-INR [[id number]]  (Normal) Collected: 03/18/25 2004    Lab Status: Final result Specimen: Blood from Arm, Right Updated: 03/18/25 2047     Protime 13.1 seconds      INR 0.97    Narrative:      INR Therapeutic Range    Indication                                             INR Range      Atrial Fibrillation                                               2.0-3.0  Hypercoagulable State                                    2.0.2.3  Left Ventricular Asist Device                            2.0-3.0  Mechanical Heart Valve                                  -    Aortic(with afib, MI, embolism, HF, LA enlargement,    and/or coagulopathy)                                     2.0-3.0 (2.5-3.5)     Mitral                                                             2.5-3.5  Prosthetic/Bioprosthetic Heart Valve               2.0-3.0  Venous thromboembolism (VTE: VT, PE        2.0-3.0    CBC  [[id number]]  (Abnormal) Collected: 03/18/25 2004    Lab Status: Final result Specimen: Blood from Arm, Right Updated: 03/18/25 2033     WBC 12.99 Thousand/uL      RBC 5.11 Million/uL      Hemoglobin 15.3 g/dL      Hematocrit 48.2 %      MCV 94 fL      MCH 29.9 pg      MCHC 31.7 g/dL      RDW 14.6 %      Platelets 260 Thousands/uL      MPV 10.6 fL     HS Troponin 0hr (reflex protocol) [[id number]]  (Normal) Collected: 03/18/25 1805    Lab Status: Final result Specimen: Blood from Arm, Right Updated: 03/18/25 1844     hs TnI 0hr 13 ng/L     Basic metabolic panel [[id number]]  (Abnormal) Collected: 03/18/25 1805    Lab Status: Final result Specimen: Blood from Arm, Right Updated: 03/18/25 1837     Sodium 138 mmol/L      Potassium 4.1 mmol/L      Chloride 97 mmol/L      CO2 31 mmol/L      ANION GAP 10 mmol/L      BUN 21 mg/dL      Creatinine 0.42 mg/dL      Glucose 114 mg/dL      Calcium 9.8 mg/dL      eGFR 98 ml/min/1.73sq m     Narrative:      National Kid[employer] Foundation guidelines for Chronic Kidney Disease (CKD):     Stage 1 with normal or high GFR (GFR > 90 mL/min/1.73 square meters)    Stage 2 Mild CKD (GFR = 60-89 mL/min/1.73 square meters)    Stage 3A Moderate CKD (GFR = 45-59 mL/min/1.73 square meters)    Stage 3B Moderate CKD (GFR = 30-44 mL/min/1.73 square meters)    Stage 4 Severe CKD (GFR = 15-29 mL/min/1.73 square meters)    Stage 5 End Stage CKD (GFR <15 mL/min/1.73 square meters)  Note: GFR calculation is accurate only with a steady state creatinine    CBC and differential [[id number]]  (Abnormal) Collected: 03/18/25 1805    Lab Status: Final result Specimen: Blood from Arm, Right Updated: 03/18/25 1819     WBC 12.21 Thousand/uL      RBC 5.26 Million/uL      Hemoglobin 15.7 g/dL      Hematocrit 49.0 %      MCV 93 fL      MCH 29.8 pg      MCHC 32.0 g/dL      RDW 14.7 %      MPV 10.3 fL      Platelets 274 Thousands/uL      nRBC 0 /100 WBCs      Segmented % 89 %      Immature Grans % 0 %       Lymphocytes % 6 %      Monocytes % 5 %      Eosinophils Relative 0 %      Basophils Relative 0 %      Absolute Neutrophils 10.71 Thousands/µL      Absolute Immature Grans 0.05 Thousand/uL      Absolute Lymphocytes 0.77 Thousands/µL      Absolute Monocytes 0.64 Thousand/µL      Eosinophils Absolute 0.01 Thousand/µL      Basophils Absolute 0.03 Thousands/µL             CT pe study w abdomen pelvis w contrast   Final Interpretation by Ronaldo Pardo MD (03/20 3805)      1.  No pulmonary embolism.   2.  Findings compatible with multifocal pneumonia, possibly the basis of large volume aspiration.   3.  Ascending aortic aneurysm measuring up to 4.9 cm, stable from most recent prior study and increased in size from 2021. Cardiothoracic surgery consultation is recommended.   4.  No acute findings in the abdomen or pelvis.            The study was marked in EPIC for immediate notification.         Workstation performed: [id number]         XR chest portable ICU   Final Interpretation by Gabe Gray MD (03/20 1346)      Interval placement of right IJ central venous catheter with tip overlying the lower SVC. No pneumothorax.      Hazy opacities in the bilateral lower lung zones suspicious for pneumonia, see same day CT.            Workstation performed: [id number]         XR chest portable ICU   Final Interpretation by Lambert Forman MD (03/20 1321)      Interval increase in the small to moderate left and trace right pleural effusions, with bibasilar atelectasis.            Workstation performed: [id number]         CTA chest pe study   Final Interpretation by Aamir Langley DO (03/19 0008)      Some images are suboptimal secondary to respiratory motion which decreases sensitivity for evaluation of peripheral pulmonary emboli; subject to this, no pulmonary embolism is seen.      Attenuation of multiple small airways in the bilateral lower lobes. Patchy alveolar and reticulonodular opacities in the  bilateral lower lobes. Tiny left-sided pleural effusion. Findings taken together suspicious for infection in the appropriate clinical    setting. Correlation with the patient's symptoms and laboratory values recommended.      Cardiomegaly, coronary atherosclerosis, fusiform ectasia of the ascending thoracic aorta measuring up to 44 mm. Recommendation is for follow-up low radiation dose chest CT in one year.      Other findings as above.         Workstation performed: [id number]             Procedures    ED Medication and Procedure Management   Prior to Admission Medications   Prescriptions Last Dose Informant Patient Reported? Taking?   Calcium Carbonate-Vitamin D 600-400 MG-UNIT per tablet  Self Yes No   Sig: Take 2 tablets by mouth daily   Multiple Vitamin (MULTIVITAMINS PO)  Self Yes No   Sig: Take by mouth   anastrozole (ARIMIDEX) 1 mg tablet  Self No No   Sig: TAKE 1 TABLET BY MOUTH EVERY DAY   Patient not taking: Reported on 10/8/2024   aspirin (ECOTRIN LOW STRENGTH) 81 mg EC tablet  Self Yes No   Sig: Take 81 mg by mouth daily   Patient not taking: Reported on 10/8/2024   azelastine (ASTELIN) 0.1 % nasal spray   No No   Si spray into each nostril 2 (two) times a day Use in each nostril as directed   guaiFENesin (MUCINEX) 600 mg 12 hr tablet   No No   Sig: Take 1 tablet (600 mg total) by mouth 2 (two) times a day for 5 days   hydroCHLOROthiazide 12.5 mg capsule   No No   Sig: TAKE 1 CAPSULE BY MOUTH EVERY DAY   levothyroxine 50 mcg tablet   No No   Sig: TAKE 1 TABLET BY MOUTH EVERY DAY   pravastatin (PRAVACHOL) 40 mg tablet   No No   Sig: TAKE 1 TABLET BY MOUTH EVERY DAY      Facility-Administered Medications: None     Current Discharge Medication List        START taking these medications    Details   apixaban (Eliquis) 5 mg Take 1 tablet (5 mg total) by mouth 2 (two) times a day  Qty: 60 tablet, Refills: 0    Associated Diagnoses: Atrial fibrillation with RVR (HCC)           CONTINUE these medications  which have NOT CHANGED    Details   anastrozole (ARIMIDEX) 1 mg tablet TAKE 1 TABLET BY MOUTH EVERY DAY  Qty: 90 tablet, Refills: 3    Associated Diagnoses: Malignant neoplasm of breast in female, estrogen receptor positive, unspecified laterality, unspecified site of breast (HCC)      aspirin (ECOTRIN LOW STRENGTH) 81 mg EC tablet Take 81 mg by mouth daily      azelastine (ASTELIN) 0.1 % nasal spray 1 spray into each nostril 2 (two) times a day Use in each nostril as directed  Qty: 1 mL, Refills: 0    Associated Diagnoses: Nasal congestion      Calcium Carbonate-Vitamin D 600-400 MG-UNIT per tablet Take 2 tablets by mouth daily      guaiFENesin (MUCINEX) 600 mg 12 hr tablet Take 1 tablet (600 mg total) by mouth 2 (two) times a day for 5 days  Qty: 10 tablet, Refills: 0    Associated Diagnoses: Nasal congestion      hydroCHLOROthiazide 12.5 mg capsule TAKE 1 CAPSULE BY MOUTH EVERY DAY  Qty: 90 capsule, Refills: 1    Associated Diagnoses: Essential hypertension      levothyroxine 50 mcg tablet TAKE 1 TABLET BY MOUTH EVERY DAY  Qty: 90 tablet, Refills: 1    Associated Diagnoses: Acquired hypothyroidism      Multiple Vitamin (MULTIVITAMINS PO) Take by mouth      pravastatin (PRAVACHOL) 40 mg tablet TAKE 1 TABLET BY MOUTH EVERY DAY  Qty: 90 tablet, Refills: 3    Associated Diagnoses: Mixed hyperlipidemia           No discharge procedures on file.  ED SEPSIS DOCUMENTATION   Time reflects when diagnosis was documented in both MDM as applicable and the Disposition within this note       Time User Action Codes Description Comment    3/18/2025  6:16 PM Chinmay Griffiths Add [R06.00] Dyspnea     3/18/2025  6:16 PM Chinmay Griffiths Add [I10] Hypertension     3/18/2025  7:50 PM Randell Joya Add [I48.91] Atrial fibrillation with RVR (HCC)     3/20/2025  2:14 AM Vangie Spear Modify [R06.00] Dyspnea     3/20/2025  2:14 AM Vangie Spear Add [R57.9] Shock (HCC)            Initial Sepsis Screening       Row Name 03/20/25 0331     "            Is the patient's history suggestive of a new or worsening infection? Yes (Proceed)  -BL        Suspected source of infection pneumonia  -BL        Indicate SIRS criteria Tachycardia > 90 bpm;Tachypnea > 20 resp per min;Leukocytosis (WBC > 34079 IJL) OR Leukopenia (WBC <4000 IJL) OR Bandemia (WBC >10% bands)  -BL        Are two or more of the above signs & symptoms of infection both present and new to the patient? Yes (Proceed)  -BL        Assess for evidence of organ dysfunction: Are any of the below criteria present within 6 hours of suspected infection and SIRS criteria that are NOT considered to be chronic conditions? MAP < 65;Lactate > 2.0;New need for invasive/non-invasive ventilation  -BL        Date of presentation of severe sepsis 03/20/25  -BL        Time of presentation of severe sepsis 0100  -BL        Date of presentation of septic shock 03/20/25  -BL        Time of presentation of septic shock 0130  -BL        Fluid Resuscitation: 30 ml/kg IV fluid bolus will be given based on actual body weight  -BL        Is the patient is persistently hypotensive in the hour after fluid bolus administration? If yes, patient meets criteria for vasopressor use. YES  -BL        Sepsis Note: Click \"NEXT\" below (NOT \"close\") to generate sepsis note based on above information. --                  User Key  (r) = Recorded By, (t) = Taken By, (c) = Cosigned By      Initials Name Provider Type     Vangie Spear PA-C Physician Assistant                       Chinmay Griffiths MD  03/20/25 2013    "

## 2025-03-21 NOTE — ASSESSMENT & PLAN NOTE
Possible sepsis secondary to pneumonia.  Continue antibiotic therapy and respiratory support.  Follow-up on blood cultures sputum cultures

## 2025-03-21 NOTE — ASSESSMENT & PLAN NOTE
Noted to have severe aneurysm of ascending aorta measuring up to 2/10.  There is significant interval increase compared to prior imaging from 2021.  No evidence of dissection.  Patient has no history of connective tissue disease.  Has not been a smoker.  Minimize sudden fluctuations/rise in intrathoracic blood pressures.  Should be on appropriate blood pressure control regimen.   Will consider outpatient referral to cardiac surgery although she is not a candidate for aortic surgery at this time.

## 2025-03-21 NOTE — PROGRESS NOTES
Progress Note - Critical Care/ICU   Name: Romayne Geissenhainer 79 y.o. female I MRN: [id number]  Unit/Bed#: ICU 14 I Date of Admission: 3/18/2025   Date of Service: 3/21/2025 I Hospital Day: 2      Assessment & Plan  Hypothyroidism  Continue home synthroid 50mcg daily   The most recent TSH unremarkable  Septic shock (HCC)  Diagnoses: Septic shock 2/2 PNA with worsening leukocytosis and neutrophilia   Culture data: FLU/COVID/RSV negative., 3/18 BC- ngtd   Procal 0.84 (3/19)  Temperature: afebrile   Plan:  F/u new BC   Abx: CTX D2 currently  Patient lives at home. Will continue trmt for CAP   Obtain MRSA swab  Legionella and Streptococcus pneumonia unremarkable  Follow-up with blood culture, sputum culture, MRSA nares  Severe CAP But drip score is low  Monitor for signs/sx of infection   Monitor fever curve and WBC  Pneumonia  Diagnoses: Septic shock 2/2 PNA  Culture data: FLU/COVID/RSV negative.  Procal 0.84 (3/19)  Temperature: afebrile   Plan:  Continue MV as above   F/u new BC   Abx: CTX D2 currently  Patient lives at home. Will continue trmt for CAP   Obtain MRSA swab  Obtain strep/legionella AG and sputum culture  Monitor for signs/sx of infection   Monitor fever curve and WBC  Acute hypoxic respiratory failure (HCC)  Acute hypoxic and hypercapnic respiratory failure 2/2 B/L PNA;   Imaging: 3/20 CXR-increased perihilar markings.  Left-sided pleural effusion  Intubated on 3/20 extubated on 3/20  Currently patient is on BiPAP for acute hypoxic and hypercapnic respiratory failure because of pneumonia.    Plan:  Repeat CTA PE study   Repeat ABG after vent setting adjustment  Daily SAT/SBT  VAP bundle in place  Abx: CTX D2 currently  Patient lives at home. Will continue trmt for CAP   Obtain MRSA swab  Obtain strep/legionella AG and sputum culture  Chest wall oscillation   HTS nebulizers TID for 3 days   Monitor for signs/sx of infection   Continuous pulse oximetry. Maintain O2 sat >92%.   Discussed patient's past  medical history and use of IVC filter further with family  Appreciate RT  Aneurysm of ascending aorta without rupture (HCC)  Repeat CT scan after 1 year  A-fib (HCC)  Paroxysmal afib with rvr  Most recent Tsh is wnl  Most likely reason for A-fib with septic shock in the background of pneumonia    Plan:  Amio drip because of n.p.o.  Hold metoprolol in the background of low blood pressure  Heparin ACS drip because of n.p.o.  Disposition: Critical care    ICU Core Measures     A: Assess, Prevent, and Manage Pain Has pain been assessed? Yes  Need for changes to pain regimen? No   B: Both SAT/SAT  N/A   C: Choice of Sedation RASS Goal: 0 Alert and Calm  Need for changes to sedation or analgesia regimen? No   D: Delirium CAM-ICU: Negative   E: Early Mobility  Plan for early mobility? NA   F: Family Engagement Plan for family engagement today? Yes       Antibiotic Review: Awaiting culture results.  and Continue broad spectrum secondary to severity of illness.     Review of Invasive Devices:      Central access plan: Patient has multiple central venous catheters.  Medications requiring central line Hemodynamic monitoring      Prophylaxis:  VTE VTE covered by:  heparin, Intravenous       Stress Ulcer  not ordered         24 Hour Events : MAP less than 65 status post  cc bolus.  Patient is on the BiPAP for acute hypoxic and hypercapnic respiratory failure.  Subjective   Review of Systems: See HPI for Review of Systems    Objective :                   Vitals I/O      Most Recent Min/Max in 24hrs   Temp 97.6 °F (36.4 °C) Temp  Min: 97.5 °F (36.4 °C)  Max: 97.8 °F (36.6 °C)   Pulse 60 Pulse  Min: 56  Max: 145   Resp 18 Resp  Min: 18  Max: 55   BP (!) 80/35 BP  Min: 80/35  Max: 139/54   O2 Sat 99 % SpO2  Min: 86 %  Max: 99 %      Intake/Output Summary (Last 24 hours) at 3/21/2025 0869  Last data filed at 3/21/2025 0118  Gross per 24 hour   Intake 557.13 ml   Output 500 ml   Net 57.13 ml       Diet NPO; Sips with  meds    Invasive Monitoring           Physical Exam   Physical Exam  Vitals and nursing note reviewed.   Eyes:      General: Vision grossly intact.      Extraocular Movements: Extraocular movements intact.      Conjunctiva/sclera: Conjunctivae normal.      Pupils: Pupils are equal, round, and reactive to light.   Skin:     General: Skin is warm.   HENT:      Head: Normocephalic and atraumatic.      Right Ear: Tympanic membrane normal.      Left Ear: Tympanic membrane normal.      Mouth/Throat:      Mouth: Mucous membranes are moist.   Neck:   no midline tenderness Cardiovascular:      Rate and Rhythm: Normal rate. Rhythm irregular.      Heart sounds: Normal heart sounds.   Musculoskeletal:         General: Normal range of motion.      Right lower leg: No edema.      Left lower leg: No edema.      Comments: ROM full in UE and LE b/l    Abdominal: General: Bowel sounds are normal. There is no distension.      Palpations: Abdomen is soft.      Tenderness: There is no abdominal tenderness.   Constitutional:       General: She is not in acute distress.     Appearance: She is well-developed and well-nourished. She is not ill-appearing.      Interventions: She is not sedated, not intubated and not restrained.  Pulmonary:      Effort: Pulmonary effort is normal. She is not intubated.      Breath sounds: Examination of the left-upper field reveals decreased breath sounds. Examination of the left-middle field reveals decreased breath sounds. Decreased breath sounds present.      Comments: Coarse breathing sound  Psychiatric:         Mood and Affect: Mood and affect normal.   Neurological:      General: No focal deficit present.      Mental Status: She is alert and oriented to person, place and time. She is CAM ICU negative.      Cranial Nerves: No dysarthria or facial asymmetry.      Sensory: Sensation is intact.      Motor: gross motor function is at baseline for patient. Tremor and strength full and intact in all  extremities. No weakness or seizure activity.      Comments: Tremulous and anxious when waking up off sedation           Diagnostic Studies        Lab Results: I have reviewed the following results:     Medications:  Scheduled PRN   amiodarone 150 mg in dextrose 5 % 100 mL IV bolus, 150 mg, Once  cefTRIAXone, 2,000 mg, Q24H  chlorhexidine, 15 mL, Q12H OSEAS  heparin, , Once  insulin lispro, 1-5 Units, 4 times day  lactated ringers, 500 mL, Once  [Held by provider] levothyroxine, 50 mcg, Early Morning  [Held by provider] metoprolol tartrate, 12.5 mg, Q12H OSEAS  [Held by provider] pravastatin, 40 mg, Daily With Dinner  sodium chloride, 4 mL, TID          Continuous    amiodarone (CORDARONE) 900 mg in dextrose 5 % 500 mL infusion, 1 mg/min   Followed by  amiodarone (CORDARONE) 900 mg in dextrose 5 % 500 mL infusion, 0.5 mg/min         Labs:   CBC    Recent Labs     03/20/25  0444 03/21/25  0505   WBC 17.27* 18.01*   HGB 11.2* 12.8   HCT 35.0 44.2    216     BMP    Recent Labs     03/20/25  1411 03/21/25  0505   SODIUM 143 143   K 3.9 4.5    103   CO2 36* 39*   AGAP 3* 1*   BUN 18 21   CREATININE 0.32* 0.40*   CALCIUM 8.7 9.2       Coags    Recent Labs     03/20/25  0052 03/20/25  0444 03/20/25  1001   INR 1.45* 1.67*  --    PTT  --  52* 61*        Additional Electrolytes  Recent Labs     03/20/25  0444 03/21/25  0505   MG 2.7 2.5   PHOS 2.1* 3.9   CAIONIZED 1.11* 1.24          Blood Gas    Recent Labs     03/19/25  2353   PHART 7.557*   AZL3HOL 37.3   PO2ART 94.8   DSC9QDS 32.4*   BEART 9.6   SOURCE Radial, Right     Recent Labs     03/19/25  2353 03/20/25  0247 03/21/25  0829   PHVEN  --    < > 7.213*   PAH5IMH  --    < > 93.0*   PO2VEN  --    < > 37.5   SYY8YUS  --    < > 36.6*   BEVEN  --    < > 5.6   W4FGFAE  --    < > 76.3   SOURCE Radial, Right  --   --     < > = values in this interval not displayed.    LFTs  Recent Labs     03/20/25  0444 03/21/25  0505   ALT 28 34   AST 38 36   ALKPHOS 67 99   ALB  2.7* 3.6   TBILI 0.91 0.29       Infectious  Recent Labs     03/19/25  2347   PROCALCITONI 0.84*     Glucose  Recent Labs     03/19/25  2347 03/20/25  0444 03/20/25  1411 03/21/25  0505   GLUC 188* 188* 113 121

## 2025-03-21 NOTE — PROGRESS NOTES
Progress Note - Cardiology   Name: Romayne Geissenhainer 79 y.o. female I MRN: [id number]  Unit/Bed#: ICU 14 I Date of Admission: 3/18/2025   Date of Service: 3/21/2025 I Hospital Day: 2    Assessment & Plan  Paroxysmal atrial fibrillation with rapid ventricular response (HCC)  Noted to go in and out of atrial fibrillation.  Currently sinus rhythm with heart rate in 60s to 70s.  Is back on amiodarone IV therapy.  Is hypotensive and on norepinephrine at 7 mcg/min.  He is anticoagulated with Lovenox therapeutic dose subcutaneous.  Clinically does not appear to be in volume overloaded state.  Will continue current regimen.  Can get dose of digoxin 120 manage milligrams x 1 if she has sustained A-fib with RVR.  Will transition back to oral medications once she is able to take them.  Septic shock (HCC)  Possible sepsis secondary to pneumonia.  Continue antibiotic therapy and respiratory support.  Follow-up on blood cultures sputum cultures  Pneumonia  Possible pneumonia with mucous plugging in the region.  Appreciate critical care team evaluation and management.  Hypothyroidism  Patient has known hypothyroidism and is on Synthroid therapy.  She will need close monitoring for amiodarone related worsening hypothyroidism or precipitating hyperthyroid state.  Acute hypoxic respiratory failure (HCC)  Patient to be extubated today.  Will undergo swallow evaluation and evaluation of vocal cords if she continues to have severe dysphonia or signs of aspiration.  Aneurysm of ascending aorta without rupture (HCC)  Noted to have severe aneurysm of ascending aorta measuring up to 2/10.  There is significant interval increase compared to prior imaging from 2021.  No evidence of dissection.  Patient has no history of connective tissue disease.  Has not been a smoker.  Minimize sudden fluctuations/rise in intrathoracic blood pressures.  Should be on appropriate blood pressure control regimen.   Will consider outpatient referral to  cardiac surgery although she is not a candidate for aortic surgery at this time.    Subjective   Chief Complaint: Follow-up for paroxysmal atrial fibrillation with rapid ventricular response.    Interval developments noted.  Patient had rapid response with acute hypoxemia on the medical floor.  Was brought down to the unit.  Where she developed A-fib with RVR and she also had wide-complex rhythm concerning for ventricular tachycardia.  She was loaded with IV amiodarone and became hypotensive and required vasopressor support.  This morning she is hemodynamically stable.  She is being extubated.  She remains in sinus rhythm currently.    Objective :  Temp:  [97.5 °F (36.4 °C)-97.8 °F (36.6 °C)] 97.6 °F (36.4 °C)  HR:  [] 64  BP: ()/(35-73) 104/65  Resp:  [18-55] 23  SpO2:  [86 %-100 %] 100 %  O2 Device: Mid flow nasal cannula  Nasal Cannula O2 Flow Rate (L/min):  [8 L/min-12 L/min] 12 L/min  Orthostatic Blood Pressures      Flowsheet Row Most Recent Value   Blood Pressure 104/65 filed at 03/21/2025 1215   Patient Position - Orthostatic VS Lying filed at 03/21/2025 0400          First Weight: Weight - Scale: 53.1 kg (117 lb 1 oz) (03/18/25 1707)  Vitals:    03/20/25 0551 03/21/25 0600   Weight: 55 kg (121 lb 4.1 oz) 59.1 kg (130 lb 4.7 oz)     Physical Exam  Constitutional:       Appearance: She is underweight. She is ill-appearing.      Interventions: She is intubated. Face mask in place.      Comments: Has NG tube. Has skin and conjuctival pallor.   Neck:      Comments: Unable to assess ventricular venous distention.  Cardiovascular:      Rate and Rhythm: Normal rate and regular rhythm.      Heart sounds: No murmur heard.  Pulmonary:      Effort: She is intubated.      Breath sounds: Examination of the right-upper field reveals decreased breath sounds. Examination of the left-upper field reveals decreased breath sounds. Examination of the right-middle field reveals decreased breath sounds and rhonchi.  Examination of the left-middle field reveals decreased breath sounds and rhonchi. Examination of the right-lower field reveals rhonchi. Examination of the left-lower field reveals rhonchi. Decreased breath sounds and rhonchi present.   Chest:      Chest wall: No edema.   Musculoskeletal:      Cervical back: Neck supple.      Right lower leg: No edema.      Left lower leg: No edema.   Skin:     General: Skin is dry.      Coloration: Skin is pale.   Neurological:      Mental Status: She is alert and oriented to person, place, and time.      Comments: Is currently off of sedation anticipation of extubation.    Responds to questions appropriately nodding head.   Psychiatric:         Behavior: Behavior normal.         Lab Results: I have reviewed the following results:CBC/BMP:   .     03/21/25  0505   WBC 18.01*   HGB 12.8   HCT 44.2      SODIUM 143   K 4.5      CO2 39*   BUN 21   CREATININE 0.40*   GLUC 121   CAIONIZED 1.24   MG 2.5   PHOS 3.9      Results from last 7 days   Lab Units 03/21/25  0505 03/20/25  0444 03/19/25  2352 03/19/25  2347   WBC Thousand/uL 18.01* 17.27*  --  21.66*   HEMOGLOBIN g/dL 12.8 11.2*  --  13.8   I STAT HEMOGLOBIN g/dl  --   --  13.9  --    HEMATOCRIT % 44.2 35.0  --  45.2   HEMATOCRIT, ISTAT %  --   --  41  --    PLATELETS Thousands/uL 216 208  --  254     Results from last 7 days   Lab Units 03/21/25  0505 03/20/25  1411 03/20/25  0444 03/19/25  2352   POTASSIUM mmol/L 4.5 3.9 3.1*  --    CHLORIDE mmol/L 103 104 99  --    CO2 mmol/L 39* 36* 34*  --    CO2, I-STAT mmol/L  --   --   --  35*   BUN mg/dL 21 18 18  --    CREATININE mg/dL 0.40* 0.32* 0.37*  --    GLUCOSE, ISTAT mg/dl  --   --   --  238*   CALCIUM mg/dL 9.2 8.7 8.4  --      Results from last 7 days   Lab Units 03/21/25  1132 03/20/25  1001 03/20/25  0444 03/20/25  0052 03/19/25  0328 03/18/25 2004   INR   --   --  1.67* 1.45*  --  0.97   PTT seconds 98* 61* 52*  --    < > 29    < > = values in this interval not  displayed.     Lab Results   Component Value Date    HGBA1C 5.5 03/20/2025     Lab Results   Component Value Date    CKTOTAL 82 03/19/2025       Imaging Results Review: I personally reviewed the following image studies in PACS and associated radiology reports: CT chest and Echocardiogram. My interpretation of the radiology images/reports is: CTA chest last night was negative for pulmonary embolism.  There was extensive debris within distal left mainstem bronchus with left lower lobe and lingular involvement.  Possible bilateral lower lobe pneumonia.  Trace left-sided pleural effusion, ascending thoracic aorta aneurysm measuring 4.9 cm.  Ascending thoracic aorta measuring 3.7 cm.  Other Study Results Review: EKG was reviewed.     VTE Pharmacologic Prophylaxis: VTE covered by:  enoxaparin, Subcutaneous

## 2025-03-21 NOTE — PLAN OF CARE
Problem: Potential for Falls  Goal: Patient will remain free of falls  Description: INTERVENTIONS:  - Educate patient/family on patient safety including physical limitations  - Instruct patient to call for assistance with activity   - Consult OT/PT to assist with strengthening/mobility   - Keep Call bell within reach  - Keep bed low and locked with side rails adjusted as appropriate  - Keep care items and personal belongings within reach  - Initiate and maintain comfort rounds  - Make Fall Risk Sign visible to staff  - Offer Toileting every 2 Hours, in advance of need  - Initiate/Maintain alarm alarm  - Obtain necessary fall risk management equipment: callbell  - Apply yellow socks and bracelet for high fall risk patients  - Consider moving patient to room near nurses station  Outcome: Progressing     Problem: PAIN - ADULT  Goal: Verbalizes/displays adequate comfort level or baseline comfort level  Description: Interventions:  - Encourage patient to monitor pain and request assistance  - Assess pain using appropriate pain scale  - Administer analgesics based on type and severity of pain and evaluate response  - Implement non-pharmacological measures as appropriate and evaluate response  - Consider cultural and social influences on pain and pain management  - Notify physician/advanced practitioner if interventions unsuccessful or patient reports new pain  Outcome: Progressing     Problem: INFECTION - ADULT  Goal: Absence or prevention of progression during hospitalization  Description: INTERVENTIONS:  - Assess and monitor for signs and symptoms of infection  - Monitor lab/diagnostic results  - Monitor all insertion sites, i.e. indwelling lines, tubes, and drains  - Monitor endotracheal if appropriate and nasal secretions for changes in amount and color  - [place] appropriate cooling/warming therapies per order  - Administer medications as ordered  - Instruct and encourage patient and family to use good hand  hygiene technique  - Identify and instruct in appropriate isolation precautions for identified infection/condition  Outcome: Progressing  Goal: Absence of fever/infection during neutropenic period  Description: INTERVENTIONS:  - Monitor WBC    Outcome: Progressing     Problem: SAFETY ADULT  Goal: Patient will remain free of falls  Description: INTERVENTIONS:  - Educate patient/family on patient safety including physical limitations  - Instruct patient to call for assistance with activity   - Consult OT/PT to assist with strengthening/mobility   - Keep Call bell within reach  - Keep bed low and locked with side rails adjusted as appropriate  - Keep care items and personal belongings within reach  - Initiate and maintain comfort rounds  - Make Fall Risk Sign visible to staff  - Offer Toileting every 2 Hours, in advance of need  - Initiate/Maintain bed alarm  - Obtain necessary fall risk management equipment: call bell  - Apply yellow socks and bracelet for high fall risk patients  - Consider moving patient to room near nurses station  Outcome: Progressing  Goal: Maintain or return to baseline ADL function  Description: INTERVENTIONS:  -  Assess patient's ability to carry out ADLs; assess patient's baseline for ADL function and identify physical deficits which impact ability to perform ADLs (bathing, care of mouth/teeth, toileting, grooming, dressing, etc.)  - Assess/evaluate cause of self-care deficits   - Assess range of motion  - Assess patient's mobility; develop plan if impaired  - Assess patient's need for assistive devices and provide as appropriate  - Encourage maximum independence but intervene and supervise when necessary  - Involve family in performance of ADLs  - Assess for home care needs following discharge   - Consider OT consult to assist with ADL evaluation and planning for discharge  - Provide patient education as appropriate  Outcome: Progressing  Goal: Maintains/Returns to pre admission functional  level  Description: INTERVENTIONS:  - Perform AM-PAC 6 Click Basic Mobility/ Daily Activity assessment daily.  - Set and communicate daily mobility goal to care team and patient/family/caregiver.   - Collaborate with rehabilitation services on mobility goals if consulted  - Reposition patient every 2 hours.  - Out of bed for toileting  - Record patient progress and toleration of activity level   Outcome: Progressing     Problem: DISCHARGE PLANNING  Goal: Discharge to home or other facility with appropriate resources  Description: INTERVENTIONS:  - Identify barriers to discharge w/patient and caregiver  - Arrange for needed discharge resources and transportation as appropriate  - Identify discharge learning needs (meds, wound care, etc.)  - Arrange for interpretive services to assist at discharge as needed  - Refer to Case Management Department for coordinating discharge planning if the patient needs post-hospital services based on physician/advanced practitioner order or complex needs related to functional status, cognitive ability, or social support system  Outcome: Progressing     Problem: Knowledge Deficit  Goal: Patient/family/caregiver demonstrates understanding of disease process, treatment plan, medications, and discharge instructions  Description: Complete learning assessment and assess knowledge base.  Interventions:  - Provide teaching at level of understanding  - Provide teaching via preferred learning methods  Outcome: Progressing     Problem: NEUROSENSORY - ADULT  Goal: Achieves stable or improved neurological status  Description: INTERVENTIONS  - Monitor and report changes in neurological status  - Monitor vital signs such as temperature, blood pressure, glucose, and any other labs ordered   - Initiate measures to prevent increased intracranial pressure  - Monitor for seizure activity and implement precautions if appropriate      Outcome: Progressing  Goal: Remains free of injury related to seizures  activity  Description: INTERVENTIONS  - Maintain airway, patient safety  and administer oxygen as ordered  - Monitor patient for seizure activity, document and report duration and description of seizure to physician/advanced practitioner  - If seizure occurs,  ensure patient safety during seizure  - Reorient patient post seizure  - Seizure pads on all 4 side rails  - Instruct patient/family to notify RN of any seizure activity including if an aura is experienced  - Instruct patient/family to call for assistance with activity based on nursing assessment  - Administer anti-seizure medications if ordered    Outcome: Progressing  Goal: Achieves maximal functionality and self care  Description: INTERVENTIONS  - Monitor swallowing and airway patency with patient fatigue and changes in neurological status  - Encourage and assist patient to increase activity and self care.   - Encourage visually impaired, hearing impaired and aphasic patients to use assistive/communication devices  Outcome: Progressing     Problem: CARDIOVASCULAR - ADULT  Goal: Maintains optimal cardiac output and hemodynamic stability  Description: INTERVENTIONS:  - Monitor I/O, vital signs and rhythm  - Monitor for S/S and trends of decreased cardiac output  - Administer and titrate ordered vasoactive medications to optimize hemodynamic stability  - Assess quality of pulses, skin color and temperature  - Assess for signs of decreased coronary artery perfusion  - Instruct patient to report change in severity of symptoms  Outcome: Progressing  Goal: Absence of cardiac dysrhythmias or at baseline rhythm  Description: INTERVENTIONS:  - Continuous cardiac monitoring, vital signs, obtain 12 lead EKG if ordered  - Administer antiarrhythmic and heart rate control medications as ordered  - Monitor electrolytes and administer replacement therapy as ordered  Outcome: Progressing     Problem: RESPIRATORY - ADULT  Goal: Achieves optimal ventilation and  oxygenation  Description: INTERVENTIONS:  - Assess for changes in respiratory status  - Assess for changes in mentation and behavior  - Position to facilitate oxygenation and minimize respiratory effort  - Oxygen administered by appropriate delivery if ordered  - Initiate smoking cessation education as indicated  - Encourage broncho-pulmonary hygiene including cough, deep breathe, Incentive Spirometry  - Assess the need for suctioning and aspirate as needed  - Assess and instruct to report SOB or any respiratory difficulty  - Respiratory Therapy support as indicated  Outcome: Progressing     Problem: GASTROINTESTINAL - ADULT  Goal: Minimal or absence of nausea and/or vomiting  Description: INTERVENTIONS:  - Administer IV fluids if ordered to ensure adequate hydration  - Maintain NPO status until nausea and vomiting are resolved  - Nasogastric tube if ordered  - Administer ordered antiemetic medications as needed  - Provide nonpharmacologic comfort measures as appropriate  - Advance diet as tolerated, if ordered  - Consider nutrition services referral to assist patient with adequate nutrition and appropriate food choices  Outcome: Progressing  Goal: Maintains or returns to baseline bowel function  Description: INTERVENTIONS:  - Assess bowel function  - Encourage oral fluids to ensure adequate hydration  - Administer IV fluids if ordered to ensure adequate hydration  - Administer ordered medications as needed  - Encourage mobilization and activity  - Consider nutritional services referral to assist patient with adequate nutrition and appropriate food choices  Outcome: Progressing  Goal: Maintains adequate nutritional intake  Description: INTERVENTIONS:  - Monitor percentage of each meal consumed  - Identify factors contributing to decreased intake, treat as appropriate  - Assist with meals as needed  - Monitor I&O, weight, and lab values if indicated  - Obtain nutrition services referral as needed  Outcome:  Progressing  Goal: Establish and maintain optimal ostomy function  Description: INTERVENTIONS:  - Assess bowel function  - Encourage oral fluids to ensure adequate hydration  - Administer IV fluids if ordered to ensure adequate hydration   - Administer ordered medications as needed  - Encourage mobilization and activity  - Nutrition services referral to assist patient with appropriate food choices  - Assess stoma site  - Consider wound care consult   Outcome: Progressing  Goal: Oral mucous membranes remain intact  Description: INTERVENTIONS  - Assess oral mucosa and hygiene practices  - Implement preventative oral hygiene regimen  - Implement oral medicated treatments as ordered  - Initiate Nutrition services referral as needed  Outcome: Progressing     Problem: GENITOURINARY - ADULT  Goal: Maintains or returns to baseline urinary function  Description: INTERVENTIONS:  - Assess urinary function  - Encourage oral fluids to ensure adequate hydration if ordered  - Administer IV fluids as ordered to ensure adequate hydration  - Administer ordered medications as needed  - Offer frequent toileting  - Follow urinary retention protocol if ordered  Outcome: Progressing  Goal: Absence of urinary retention  Description: INTERVENTIONS:  - Assess patient’s ability to void and empty bladder  - Monitor I/O  - Bladder scan as needed  - Discuss with physician/AP medications to alleviate retention as needed  - Discuss catheterization for long term situations as appropriate  Outcome: Progressing  Goal: Urinary catheter remains patent  Description: INTERVENTIONS:  - Assess patency of urinary catheter  - If patient has a chronic quiñones, consider changing catheter if non-functioning  - Follow guidelines for intermittent irrigation of non-functioning urinary catheter  Outcome: Progressing     Problem: METABOLIC, FLUID AND ELECTROLYTES - ADULT  Goal: Electrolytes maintained within normal limits  Description: INTERVENTIONS:  - Monitor labs  and assess patient for signs and symptoms of electrolyte imbalances  - Administer electrolyte replacement as ordered  - Monitor response to electrolyte replacements, including repeat lab results as appropriate  - Instruct patient on fluid and nutrition as appropriate  Outcome: Progressing  Goal: Fluid balance maintained  Description: INTERVENTIONS:  - Monitor labs   - Monitor I/O and WT  - Instruct patient on fluid and nutrition as appropriate  - Assess for signs & symptoms of volume excess or deficit  Outcome: Progressing  Goal: Glucose maintained within target range  Description: INTERVENTIONS:  - Monitor Blood Glucose as ordered  - Assess for signs and symptoms of hyperglycemia and hypoglycemia  - Administer ordered medications to maintain glucose within target range  - Assess nutritional intake and initiate nutrition service referral as needed  Outcome: Progressing     Problem: HEMATOLOGIC - ADULT  Goal: Maintains hematologic stability  Description: INTERVENTIONS  - Assess for signs and symptoms of bleeding or hemorrhage  - Monitor labs  - Administer supportive blood products/factors as ordered and appropriate  Outcome: Progressing     Problem: MUSCULOSKELETAL - ADULT  Goal: Maintain or return mobility to safest level of function  Description: INTERVENTIONS:  - Assess patient's ability to carry out ADLs; assess patient's baseline for ADL function and identify physical deficits which impact ability to perform ADLs (bathing, care of mouth/teeth, toileting, grooming, dressing, etc.)  - Assess/evaluate cause of self-care deficits   - Assess range of motion  - Assess patient's mobility  - Assess patient's need for assistive devices and provide as appropriate  - Encourage maximum independence but intervene and supervise when necessary  - Involve family in performance of ADLs  - Assess for home care needs following discharge   - Consider OT consult to assist with ADL evaluation and planning for discharge  - Provide  patient education as appropriate  Outcome: Progressing  Goal: Maintain proper alignment of affected body part  Description: INTERVENTIONS:  - Support, maintain and protect limb and body alignment  - Provide patient/ family with appropriate education  Outcome: Progressing     Problem: Nutrition/Hydration-ADULT  Goal: Nutrient/Hydration intake appropriate for improving, restoring or maintaining nutritional needs  Description: Monitor and assess patient's nutrition/hydration status for malnutrition. Collaborate with interdisciplinary team and initiate plan and interventions as ordered.  Monitor patient's weight and dietary intake as ordered or per policy. Utilize nutrition screening tool and intervene as necessary. Determine patient's food preferences and provide high-protein, high-caloric foods as appropriate.     INTERVENTIONS:  - Monitor oral intake, urinary output, labs, and treatment plans  - Assess nutrition and hydration status and recommend course of action  - Evaluate amount of meals eaten  - Assist patient with eating if necessary   - Allow adequate time for meals  - Recommend/ encourage appropriate diets, oral nutritional supplements, and vitamin/mineral supplements  - Order, calculate, and assess calorie counts as needed  - Recommend, monitor, and adjust tube feedings and TPN/PPN based on assessed needs  - Assess need for intravenous fluids  - Provide specific nutrition/hydration education as appropriate  - Include patient/family/caregiver in decisions related to nutrition  Outcome: Progressing     Problem: SAFETY,RESTRAINT: NV/NON-SELF DESTRUCTIVE BEHAVIOR  Goal: Remains free of harm/injury (restraint for non violent/non self-detsructive behavior)  Description: INTERVENTIONS:  - Instruct patient/family regarding restraint use   - Assess and monitor physiologic and psychological status   - Provide interventions and comfort measures to meet assessed patient needs   - Identify and implement measures to help  patient regain control  - Assess readiness for release of restraint   Outcome: Progressing  Goal: Returns to optimal restraint-free functioning  Description: INTERVENTIONS:  - Assess the patient's behavior and symptoms that indicate continued need for restraint  - Identify and implement measures to help patient regain control  - Assess readiness for release of restraint   Outcome: Progressing     Problem: Prexisting or High Potential for Compromised Skin Integrity  Goal: Skin integrity is maintained or improved  Description: INTERVENTIONS:  - Identify patients at risk for skin breakdown  - Assess and monitor skin integrity  - Assess and monitor nutrition and hydration status  - Monitor labs   - Assess for incontinence   - Turn and reposition patient  - Assist with mobility/ambulation  - Relieve pressure over bony prominences  - Avoid friction and shearing  - Provide appropriate hygiene as needed including keeping skin clean and dry  - Evaluate need for skin moisturizer/barrier cream  - Collaborate with interdisciplinary team   - Patient/family teaching  - Consider wound care consult   Outcome: Progressing

## 2025-03-21 NOTE — PATIENT SAFETY RESTRAINT NOTE
Progress Note - Death in Restraints   Romayne Geissenhainer 79 y.o. female MRN: [id number]  Unit/Bed#: ICU 14 Encounter: [id number]      Patient  within 24 hours of restraint  with Soft restraint right wrist and Soft restraint left wrist. Death unrelated to use of restraints. This situation was tracked internally. CMS and PA-OTIS  notification not required.

## 2025-03-23 LAB
BACTERIA BLD CULT: NORMAL
BACTERIA BLD CULT: NORMAL

## 2025-03-24 LAB
BACTERIA BLD CULT: NORMAL
BACTERIA BLD CULT: NORMAL

## 2025-03-25 LAB
BACTERIA BLD CULT: NORMAL
BACTERIA BLD CULT: NORMAL